# Patient Record
Sex: FEMALE
[De-identification: names, ages, dates, MRNs, and addresses within clinical notes are randomized per-mention and may not be internally consistent; named-entity substitution may affect disease eponyms.]

---

## 2017-09-13 ENCOUNTER — HOSPITAL ENCOUNTER (EMERGENCY)
Dept: HOSPITAL 14 - H.ER | Age: 82
Discharge: HOME | End: 2017-09-13
Payer: MEDICARE

## 2017-09-13 VITALS
TEMPERATURE: 99 F | HEART RATE: 71 BPM | SYSTOLIC BLOOD PRESSURE: 149 MMHG | DIASTOLIC BLOOD PRESSURE: 70 MMHG | RESPIRATION RATE: 16 BRPM | OXYGEN SATURATION: 87 %

## 2017-09-13 VITALS — BODY MASS INDEX: 38.7 KG/M2

## 2017-09-13 DIAGNOSIS — Z95.5: ICD-10-CM

## 2017-09-13 DIAGNOSIS — Z88.0: ICD-10-CM

## 2017-09-13 DIAGNOSIS — Z79.82: ICD-10-CM

## 2017-09-13 DIAGNOSIS — K80.20: Primary | ICD-10-CM

## 2017-09-13 DIAGNOSIS — I10: ICD-10-CM

## 2017-09-13 LAB
ALBUMIN/GLOB SERPL: 1.3 {RATIO} (ref 1–2.1)
ALP SERPL-CCNC: 112 U/L (ref 38–126)
ALT SERPL-CCNC: 30 U/L (ref 9–52)
AST SERPL-CCNC: 22 U/L (ref 14–36)
BASE EXCESS BLDV CALC-SCNC: 7.4 MMOL/L (ref 0–2)
BASOPHILS # BLD AUTO: 0 K/UL (ref 0–0.2)
BASOPHILS NFR BLD: 0.4 % (ref 0–2)
BILIRUB SERPL-MCNC: 0.5 MG/DL (ref 0.2–1.3)
BUN SERPL-MCNC: 18 MG/DL (ref 7–17)
CALCIUM SERPL-MCNC: 8.9 MG/DL (ref 8.4–10.2)
CHLORIDE SERPL-SCNC: 103 MMOL/L (ref 98–107)
CO2 SERPL-SCNC: 30 MMOL/L (ref 22–30)
EOSINOPHIL # BLD AUTO: 0.3 K/UL (ref 0–0.7)
EOSINOPHIL NFR BLD: 3.5 % (ref 0–4)
ERYTHROCYTE [DISTWIDTH] IN BLOOD BY AUTOMATED COUNT: 15.5 % (ref 11.5–14.5)
GLOBULIN SER-MCNC: 2.8 GM/DL (ref 2.2–3.9)
GLUCOSE SERPL-MCNC: 98 MG/DL (ref 65–105)
HCT VFR BLD CALC: 39 % (ref 34–47)
LYMPHOCYTES # BLD AUTO: 0.9 K/UL (ref 1–4.3)
LYMPHOCYTES NFR BLD AUTO: 11.6 % (ref 20–40)
MCH RBC QN AUTO: 27.3 PG (ref 27–31)
MCHC RBC AUTO-ENTMCNC: 31.2 G/DL (ref 33–37)
MCV RBC AUTO: 87.5 FL (ref 81–99)
MONOCYTES # BLD: 0.6 K/UL (ref 0–0.8)
MONOCYTES NFR BLD: 8.4 % (ref 0–10)
NEUTROPHILS # BLD: 5.8 K/UL (ref 1.8–7)
NEUTROPHILS NFR BLD AUTO: 76.1 % (ref 50–75)
NRBC BLD AUTO-RTO: 0 % (ref 0–0)
PCO2 BLDV: 68 MMHG (ref 40–60)
PH BLDV: 7.33 [PH] (ref 7.32–7.43)
PLATELET # BLD: 217 K/UL (ref 130–400)
PMV BLD AUTO: 7.9 FL (ref 7.2–11.7)
POTASSIUM SERPL-SCNC: 4.5 MMOL/L (ref 3.6–5)
PROT SERPL-MCNC: 6.4 G/DL (ref 6.3–8.2)
SODIUM SERPL-SCNC: 141 MMOL/L (ref 132–148)
WBC # BLD AUTO: 7.6 K/UL (ref 4.8–10.8)

## 2017-09-13 PROCEDURE — 71020: CPT

## 2017-09-13 PROCEDURE — 80053 COMPREHEN METABOLIC PANEL: CPT

## 2017-09-13 PROCEDURE — 96374 THER/PROPH/DIAG INJ IV PUSH: CPT

## 2017-09-13 PROCEDURE — 99282 EMERGENCY DEPT VISIT SF MDM: CPT

## 2017-09-13 PROCEDURE — 85025 COMPLETE CBC W/AUTO DIFF WBC: CPT

## 2017-09-13 PROCEDURE — 74177 CT ABD & PELVIS W/CONTRAST: CPT

## 2017-09-13 PROCEDURE — 82803 BLOOD GASES ANY COMBINATION: CPT

## 2017-09-13 PROCEDURE — 84484 ASSAY OF TROPONIN QUANT: CPT

## 2017-09-13 NOTE — ED PDOC
HPI: Abdomen


Time Seen by Provider: 09/13/17 12:30


Chief Complaint (Nursing): Abdominal Pain


Chief Complaint (Provider): Abdominal pain


History Per: Patient


History/Exam Limitations: no limitations


Onset/Duration Of Symptoms: Worse Since (one week)


Location Of Pain/Discomfort: Other (right flank)


Quality Of Discomfort: Sharp (constant, and non-radiating)


Associated Symptoms: denies: Fever, Vomiting, Diarrhea, Urinary Symptoms


Additional Complaint(s): 





Patient is a 85 y/o female sent by Dr. Pyle for evaluation of sharp and 

constant right flank pain without radiation x1 week that is worse today. Of note

, patient reports ongoing baseline shortness of breath, and is on nocturnal 

supplemental oxygen for chronic obstructive pulmonary disorder. Denies vomiting

, diarrhea, fever, urinary symptoms, or other complaints.





PCP: Dr. Pyle





Past Medical History


Reviewed: Historical Data, Nursing Documentation, Vital Signs


Vital Signs: 


 Last Vital Signs











Temp  99.0 F   09/13/17 12:17


 


Pulse  71   09/13/17 12:17


 


Resp  16   09/13/17 12:17


 


BP  149/70   09/13/17 12:17


 


Pulse Ox  87 L  09/13/17 14:08














- Medical History


PMH: Atrial Fibrillation (transient), COPD, Emphysema, Fractures (r ankle), HTN


   Denies: Diabetes, Chronic Kidney Disease





- Surgical History


Surgical History: Coronary Stent (x 2)


   Denies: Appendectomy





- Family History


Family History: States: Unknown Family Hx





- Social History


Current smoker - smoking cessation education provided: No


Ex-Smoker (has not smoked in the last 12 months): Yes


Alcohol: None


Drugs: Denies





- Home Medications


Home Medications: 


 Ambulatory Orders











 Medication  Instructions  Recorded


 


Aspirin [Ecotrin] 81 mg PO DAILY 06/20/16


 


Atorvastatin [Lipitor] 80 mg PO HS 06/20/16


 


Bisoprolol [Zebeta] 2.5 mg PO DAILY 06/20/16


 


Clopidogrel [Plavix] 75 mg PO DAILY 06/20/16


 


Isosorbide Mononitrate ER [Imdur 30 mg PO DAILY 06/20/16





ER]  


 


Lisinopril [Zestril] 2.5 mg PO DAILY 06/20/16


 


Albuterol 0.083% [Albuterol 0.083% 3 ml IH Q6H PRN 10/05/16





Inhal Sol (2.5 mg/3 ml) UD]  


 


Albuterol HFA [Ventolin HFA 90 2 puff IH Q4H PRN 10/05/16





mcg/actuation (8 g)]  


 


traMADol [Ultram] 50 mg PO TID PRN #12 tab 09/13/17














- Allergies


Allergies/Adverse Reactions: 


 Allergies











Allergy/AdvReac Type Severity Reaction Status Date / Time


 


Penicillins Allergy Mild SWELLING Verified 10/05/16 09:43














Review of Systems


ROS Statement: Except As Marked, All Systems Reviewed And Found Negative


Constitutional: Negative for: Fever


Gastrointestinal: Negative for: Vomiting, Diarrhea


Genitourinary Female: Negative for: Dysuria, Frequency, Hematuria


Musculoskeletal: Positive for: Other (right flank pain)





Physical Exam





- Reviewed


Nursing Documentation Reviewed: Yes


Vital Signs Reviewed: Yes





- Physical Exam


Appears: Positive for: Uncomfortable


Head Exam: Positive for: ATRAUMATIC, NORMAL INSPECTION, NORMOCEPHALIC


Skin: Positive for: Normal Color, Warm, Dry (with no zoster lesions)


Eye Exam: Positive for: Normal appearance


Neck: Positive for: Normal


Cardiovascular/Chest: Positive for: Regular Rate, Rhythm


Respiratory: Positive for: Respiratory Distress (mild), Other (speaking full 

sentences).  Negative for: Decreased Breath Sounds (bilaterally)


Gastrointestinal/Abdominal: Positive for: Soft, Tenderness (mild right sided 

abdominal tenderness)


Back: Positive for: Other (right flank tenderness)


Extremity: Positive for: Normal ROM


Neurologic/Psych: Positive for: Alert, Oriented (x3)





- Laboratory Results


Result Diagrams: 


 09/13/17 13:00





 09/13/17 13:55





- ECG


O2 Sat by Pulse Oximetry: 87 (RA)


Pulse Ox Interpretation: Abnormal (hypoxic)





Medical Decision Making


Medical Decision Making: 


Time: 12:31





Initial impression:  Abdominal pain





Initial plan:


 CT Abdominal/pelvic


 Labs


 Chest X-Ray


 Toradol 15 mg IV


 Urinalysis


 Reevaluation








------------





My Comment : 


********************************************************************************

***





PROCEDURE:  CT Abdomen and Pelvis with contrast





HISTORY:


R flank pain x1wk





COMPARISON:


None.





TECHNIQUE:


Contrast dose: 95 cc of Omnipaque 3





Radiation dose:





Total exam DLP = 1027 mGy-cm.





This CT exam was performed using one or more of the following dose reduction 

techniques: Automated exposure control, adjustment of the mA and/or kV 

according to patient size, and/or use of iterative reconstruction technique.





FINDINGS:





LOWER THORAX:


Unremarkable. 





LIVER:


Unremarkable. No gross lesion or ductal dilatation. 





GALLBLADDER AND BILE DUCTS:


Cholelithiasis. . 





PANCREAS:


Unremarkable. No gross lesion or ductal dilatation.





SPLEEN:


Unremarkable. 





ADRENALS:


Unremarkable. No mass. 





KIDNEYS AND URETERS:


Unremarkable. No hydronephrosis. No solid mass. 





VASCULATURE:


Unremarkable. No aortic aneurysm. 





BOWEL:


Unremarkable. No obstruction. No gross mural thickening. 





APPENDIX:


Normal appendix. 





PERITONEUM:


Unremarkable. No free fluid. No free air. 





LYMPH NODES:


Unremarkable. No enlarged lymph nodes. 





BLADDER:


Unremarkable. 





REPRODUCTIVE:


Unremarkable. 





BONES:


No acute fracture. 





OTHER FINDINGS:


None.





IMPRESSION:


Cholelithiasis.





Per Dr Ovalles radiologist





---------------


5pm PMD Dr Pyle paged to inform





6PM D/W Dr Pyle, given normal LFTs, normal WBC, no fever, can be discharged to 

followup outpatient.


Rx tramadol.


Pt offered admission for evaluation by surgeon but prefers to go home.  

Explained risk of delayed surgery of gallstones can be significant. 

















Scribe Attestation:


Documented by Jackie Logan, acting as a scribe for Alcon Lyon DO.





Provider Scribe Attestation:


All medical record entries made by the Scribe were at my direction and 

personally dictated by me. I have reviewed the chart and agree that the record 

accurately reflects my personal performance of the history, physical exam, 

medical decision making, and the department course for this patient. I have 

also personally directed, reviewed, and agree with the discharge instructions 

and disposition.





Disposition





- Clinical Impression


Clinical Impression: 


 Cholelithiases








- Patient ED Disposition


Is Patient to be Admitted: No


Counseled Patient/Family Regarding: Studies Performed, Diagnosis, Need For 

Followup, Rx Given





- Disposition


Disposition: Routine/Home


Disposition Time: 16:50


Condition: FAIR


Forms:  [employer] (English)

## 2017-09-13 NOTE — CT
PROCEDURE:  CT Abdomen and Pelvis with contrast



HISTORY:

R flank pain x1wk



COMPARISON:

None.



TECHNIQUE:

Contrast dose: 95 cc of Omnipaque 3



Radiation dose:



Total exam DLP = 1027 mGy-cm.



This CT exam was performed using one or more of the following dose 

reduction techniques: Automated exposure control, adjustment of the 

mA and/or kV according to patient size, and/or use of iterative 

reconstruction technique.



FINDINGS:



LOWER THORAX:

Unremarkable. 



LIVER:

Unremarkable. No gross lesion or ductal dilatation. 



GALLBLADDER AND BILE DUCTS:

Cholelithiasis. . 



PANCREAS:

Unremarkable. No gross lesion or ductal dilatation.



SPLEEN:

Unremarkable. 



ADRENALS:

Unremarkable. No mass. 



KIDNEYS AND URETERS:

Unremarkable. No hydronephrosis. No solid mass. 



VASCULATURE:

Unremarkable. No aortic aneurysm. 



BOWEL:

Unremarkable. No obstruction. No gross mural thickening. 



APPENDIX:

Normal appendix. 



PERITONEUM:

Unremarkable. No free fluid. No free air. 



LYMPH NODES:

Unremarkable. No enlarged lymph nodes. 



BLADDER:

Unremarkable. 



REPRODUCTIVE:

Unremarkable. 



BONES:

No acute fracture. 



OTHER FINDINGS:

None.



IMPRESSION:

Cholelithiasis.

## 2017-09-13 NOTE — RAD
HISTORY:

Chest pain.  Infiltrate suspected.



COMPARISON:

10/05/2016



TECHNIQUE:

Chest PA and lateral



FINDINGS:



LUNGS:

Hyperinflation, manifestations of COPD. No active pulmonary disease. 

Chronic interstitial lung disease. 



PLEURA:

No significant pleural effusion identified. No pneumothorax apparent.



CARDIOVASCULAR:

Cardiomegaly.  No evidence of acute, significant cardiovascular 

disease. 



OSSEOUS STRUCTURES:

No significant abnormalities.



VISUALIZED UPPER ABDOMEN:

Normal.



OTHER FINDINGS:

None.



IMPRESSION:

No active disease. No significant interval change compared to the 

prior examination(s).

## 2017-09-15 ENCOUNTER — HOSPITAL ENCOUNTER (INPATIENT)
Dept: HOSPITAL 14 - H.ER | Age: 82
LOS: 5 days | Discharge: HOME | DRG: 445 | End: 2017-09-20
Attending: GENERAL ACUTE CARE HOSPITAL | Admitting: GENERAL ACUTE CARE HOSPITAL
Payer: MEDICARE

## 2017-09-15 VITALS — BODY MASS INDEX: 38.7 KG/M2

## 2017-09-15 DIAGNOSIS — Z99.81: ICD-10-CM

## 2017-09-15 DIAGNOSIS — I25.2: ICD-10-CM

## 2017-09-15 DIAGNOSIS — D64.9: ICD-10-CM

## 2017-09-15 DIAGNOSIS — Z87.891: ICD-10-CM

## 2017-09-15 DIAGNOSIS — J44.1: ICD-10-CM

## 2017-09-15 DIAGNOSIS — R09.02: ICD-10-CM

## 2017-09-15 DIAGNOSIS — I48.0: ICD-10-CM

## 2017-09-15 DIAGNOSIS — I25.10: ICD-10-CM

## 2017-09-15 DIAGNOSIS — I50.9: ICD-10-CM

## 2017-09-15 DIAGNOSIS — K21.9: ICD-10-CM

## 2017-09-15 DIAGNOSIS — E66.09: ICD-10-CM

## 2017-09-15 DIAGNOSIS — Z88.0: ICD-10-CM

## 2017-09-15 DIAGNOSIS — Z95.5: ICD-10-CM

## 2017-09-15 DIAGNOSIS — I27.2: ICD-10-CM

## 2017-09-15 DIAGNOSIS — K80.20: Primary | ICD-10-CM

## 2017-09-15 DIAGNOSIS — M40.00: ICD-10-CM

## 2017-09-15 DIAGNOSIS — J84.10: ICD-10-CM

## 2017-09-15 DIAGNOSIS — Z79.82: ICD-10-CM

## 2017-09-15 DIAGNOSIS — I11.0: ICD-10-CM

## 2017-09-15 LAB
ALBUMIN/GLOB SERPL: 1.3 {RATIO} (ref 1–2.1)
ALP SERPL-CCNC: 108 U/L (ref 38–126)
ALT SERPL-CCNC: 34 U/L (ref 9–52)
APTT BLD: 33.8 SECONDS (ref 25.6–37.1)
AST SERPL-CCNC: 32 U/L (ref 14–36)
BASE EXCESS BLDV CALC-SCNC: 9.6 MMOL/L (ref 0–2)
BASOPHILS # BLD AUTO: 0 K/UL (ref 0–0.2)
BASOPHILS NFR BLD: 0.5 % (ref 0–2)
BILIRUB SERPL-MCNC: 0.5 MG/DL (ref 0.2–1.3)
BUN SERPL-MCNC: 18 MG/DL (ref 7–17)
CALCIUM SERPL-MCNC: 9.2 MG/DL (ref 8.4–10.2)
CHLORIDE SERPL-SCNC: 102 MMOL/L (ref 98–107)
CO2 SERPL-SCNC: 29 MMOL/L (ref 22–30)
EOSINOPHIL # BLD AUTO: 0.2 K/UL (ref 0–0.7)
EOSINOPHIL NFR BLD: 1 % (ref 0–7)
EOSINOPHIL NFR BLD: 2 % (ref 0–4)
ERYTHROCYTE [DISTWIDTH] IN BLOOD BY AUTOMATED COUNT: 14.9 % (ref 11.5–14.5)
GLOBULIN SER-MCNC: 2.9 GM/DL (ref 2.2–3.9)
GLUCOSE SERPL-MCNC: 111 MG/DL (ref 65–105)
HCT VFR BLD CALC: 40.1 % (ref 34–47)
LG PLATELETS BLD QL SMEAR: PRESENT
LIPASE SERPL-CCNC: 29 U/L (ref 23–300)
LYMPHOCYTES # BLD AUTO: 0.7 K/UL (ref 1–4.3)
LYMPHOCYTES NFR BLD AUTO: 8.1 % (ref 20–40)
MCH RBC QN AUTO: 27.7 PG (ref 27–31)
MCHC RBC AUTO-ENTMCNC: 31.3 G/DL (ref 33–37)
MCV RBC AUTO: 88.4 FL (ref 81–99)
MONOCYTES # BLD: 0.6 K/UL (ref 0–0.8)
MONOCYTES NFR BLD: 7.4 % (ref 0–10)
NEUTROPHILS # BLD: 6.9 K/UL (ref 1.8–7)
NEUTROPHILS NFR BLD AUTO: 81 % (ref 42–75)
NEUTROPHILS NFR BLD AUTO: 82 % (ref 50–75)
NRBC BLD AUTO-RTO: 0 % (ref 0–0)
PCO2 BLDV: 35 MMHG (ref 40–60)
PH BLDV: 7.57 [PH] (ref 7.32–7.43)
PLATELET # BLD: 213 K/UL (ref 130–400)
PMV BLD AUTO: 7.9 FL (ref 7.2–11.7)
POTASSIUM SERPL-SCNC: 4.4 MMOL/L (ref 3.6–5)
PROT SERPL-MCNC: 6.6 G/DL (ref 6.3–8.2)
SODIUM SERPL-SCNC: 141 MMOL/L (ref 132–148)
TOTAL CELLS COUNTED BLD: 100
WBC # BLD AUTO: 8.4 K/UL (ref 4.8–10.8)

## 2017-09-15 RX ADMIN — CIPROFLOXACIN SCH: 2 INJECTION, SOLUTION INTRAVENOUS at 21:19

## 2017-09-15 RX ADMIN — CIPROFLOXACIN SCH MLS/HR: 2 INJECTION, SOLUTION INTRAVENOUS at 19:22

## 2017-09-15 NOTE — CP.PCM.HP
History of Present Illness





- History of Present Illness


History of Present Illness: 





87 yo female with history of CAD (2 stents 4 yrs ago), AFib (paroxysmal), COPD (

oxygen dependent) and HTN was seen yesterday in the ER because of on and off 

RUQ pain since a week ago. She was advised admission but patient said she would 

rather follow up with the surgeon the next day. Came back today because pain 

had not improved and now accompanied with nausea and vomiting. Denied fever, 

chills, chest pain or SOB.




















Present on Admission





- Present on Admission


Any Indicators Present on Admission: No


History of DVT/PE: No


History of Uncontrolled Diabetes: No


Urinary Catheter: No


Decubitus Ulcer Present: No





Review of Systems





- Review of Systems


All systems: reviewed and no additional remarkable complaints except (aside 

from those mentioned above, 12 point system review were negative by me)





Past Patient History





- Infectious Disease


Hx of Infectious Diseases: None





- Tetanus Immunizations


Tetanus Immunization: Unknown





- Past Medical History & Family History


Past Medical History?: Yes





- Past Social History


Smoking Status: Former Smoker


Alcohol: None


Drugs: Denies


Home Situation {Lives}: Alone





- CARDIAC


Hx Atrial Fibrillation: Yes (transient)


Hx Hypertension: Yes





- PULMONARY


Hx Chronic Obstructive Pulmonary Disease (COPD): Yes


Hx Emphysema: Yes





- NEUROLOGICAL


Hx Neurological Disorder: No





- HEENT


Hx HEENT Problems: No





- RENAL


Hx Chronic Kidney Disease: No





- ENDOCRINE/METABOLIC


Hx Endocrine Disorders: No





- HEMATOLOGICAL/ONCOLOGICAL


Hx Blood Disorders: No





- INTEGUMENTARY


Hx Dermatological Problems: No





- MUSCULOSKELETAL/RHEUMATOLOGICAL


Hx Fractures: Yes (r ankle)





- GASTROINTESTINAL


Hx Gastrointestinal Disorders: No





- GENITOURINARY/GYNECOLOGICAL


Hx Genitourinary Disorders: Yes


Hx Incontinence: Yes (stress)





- PSYCHIATRIC


Hx Psychophysiologic Disorder: No


Hx Substance Use: No





- SURGICAL HISTORY


Hx Appendectomy: No


Hx Coronary Stent: Yes (x 2)





- ANESTHESIA


Hx Anesthesia: Yes


Hx Anesthesia Reactions: No


Hx Malignant Hyperthermia: No





Meds


Allergies/Adverse Reactions: 


 Allergies











Allergy/AdvReac Type Severity Reaction Status Date / Time


 


Penicillins Allergy Mild SWELLING Verified 10/05/16 09:43














Physical Exam





- Constitutional


Appears: No Acute Distress





- Head Exam


Head Exam: ATRAUMATIC





- Eye Exam


Eye Exam: absent: Scleral icterus





- ENT Exam


ENT Exam: Mucous Membranes Moist





- Neck Exam


Neck exam: Negative for: Meningismus





- Respiratory Exam


Respiratory Exam: Decreased Breath Sounds, Rhonchi, Wheezes.  absent: 

Respiratory Distress





- Cardiovascular Exam


Cardiovascular Exam: REGULAR RHYTHM, +S1, +S2





- GI/Abdominal Exam


GI & Abdominal Exam: Diminished Bowel Sounds, Soft.  absent: Tenderness





- Rectal Exam


Rectal Exam: Deferred





- Extremities Exam


Extremities exam: Positive for: pedal edema (slight erythema and swelling of 

both legs).  Negative for: calf tenderness





- Neurological Exam


Neurological exam: Alert, Oriented x3





- Psychiatric Exam


Psychiatric exam: Anxious





- Skin


Skin Exam: Dry, Intact





Results





- Vital Signs


Recent Vital Signs: 





 Last Vital Signs











Temp  97.6 F   09/15/17 12:17


 


Pulse  69   09/15/17 12:17


 


Resp  18   09/15/17 12:17


 


BP  135/73   09/15/17 12:17


 


Pulse Ox      














- Labs


Result Diagrams: 


 09/15/17 12:30





 09/15/17 12:30


Labs: 





 Laboratory Results - last 24 hr











  09/15/17 09/15/17 09/15/17





  12:30 12:30 12:30


 


WBC   8.4 


 


RBC   4.54 


 


Hgb   12.6 


 


Hct   40.1 


 


MCV   88.4 


 


MCH   27.7 


 


MCHC   31.3 L 


 


RDW   14.9 H 


 


Plt Count   213 


 


MPV   7.9 


 


Neut % (Auto)   82.0 H 


 


Lymph % (Auto)   8.1 L 


 


Mono % (Auto)   7.4 


 


Eos % (Auto)   2.0 


 


Baso % (Auto)   0.5 


 


Neut #   6.9 


 


Lymph #   0.7 L 


 


Mono #   0.6 


 


Eos #   0.2 


 


Baso #   0.0 


 


Neutrophils % (Manual)   81 H 


 


Band Neutrophils %   1 


 


Lymphocytes % (Manual)   9 L 


 


Monocytes % (Manual)   8 


 


Eosinophils % (Manual)   1 


 


Platelet Estimate   Normal 


 


Large Platelets   Present 


 


Poikilocytosis (manual   Slight 


 


Anisocytosis (manual)   Slight 


 


Ovalocytes   Slight 


 


PT    11.9


 


INR    1.2


 


APTT    33.8


 


pO2   


 


VBG pH   


 


VBG pCO2   


 


VBG HCO3   


 


VBG Total CO2   


 


VBG O2 Sat (Calc)   


 


VBG Base Excess   


 


VBG Potassium   


 


Glucose   


 


Lactate   


 


FiO2   


 


Sodium  141  


 


Potassium  4.4  


 


Chloride  102  


 


Carbon Dioxide  29  


 


Anion Gap  14  


 


BUN  18 H  


 


Creatinine  0.5 L  


 


Est GFR ( Amer)  > 60  


 


Est GFR (Non-Af Amer)  > 60  


 


Random Glucose  111 H  


 


Calcium  9.2  


 


Total Bilirubin  0.5  


 


AST  32  


 


ALT  34  


 


Alkaline Phosphatase  108  


 


Troponin I  < 0.0120  


 


NT-Pro-B Natriuret Pep  504  


 


Total Protein  6.6  


 


Albumin  3.7  


 


Globulin  2.9  


 


Albumin/Globulin Ratio  1.3  


 


Lipase  29  


 


Venous Blood Potassium   














  09/15/17





  12:50


 


WBC 


 


RBC 


 


Hgb 


 


Hct 


 


MCV 


 


MCH 


 


MCHC 


 


RDW 


 


Plt Count 


 


MPV 


 


Neut % (Auto) 


 


Lymph % (Auto) 


 


Mono % (Auto) 


 


Eos % (Auto) 


 


Baso % (Auto) 


 


Neut # 


 


Lymph # 


 


Mono # 


 


Eos # 


 


Baso # 


 


Neutrophils % (Manual) 


 


Band Neutrophils % 


 


Lymphocytes % (Manual) 


 


Monocytes % (Manual) 


 


Eosinophils % (Manual) 


 


Platelet Estimate 


 


Large Platelets 


 


Poikilocytosis (manual 


 


Anisocytosis (manual) 


 


Ovalocytes 


 


PT 


 


INR 


 


APTT 


 


pO2  30


 


VBG pH  7.57 H


 


VBG pCO2  35 L


 


VBG HCO3  31.7


 


VBG Total CO2  33.2 H


 


VBG O2 Sat (Calc)  77.6 H


 


VBG Base Excess  9.6 H


 


VBG Potassium  5.5 H


 


Glucose  65


 


Lactate  1.4


 


FiO2  21.0


 


Sodium 


 


Potassium 


 


Chloride  129.0 H


 


Carbon Dioxide 


 


Anion Gap 


 


BUN 


 


Creatinine 


 


Est GFR ( Amer) 


 


Est GFR (Non-Af Amer) 


 


Random Glucose 


 


Calcium 


 


Total Bilirubin 


 


AST 


 


ALT 


 


Alkaline Phosphatase 


 


Troponin I 


 


NT-Pro-B Natriuret Pep 


 


Total Protein 


 


Albumin 


 


Globulin 


 


Albumin/Globulin Ratio 


 


Lipase 


 


Venous Blood Potassium  5.5 H














Assessment & Plan


(1) Abdominal pain


Status: Acute   


Comment: admit in med/surg.  surgical consult with Dr Montenegro.  keep NPO.  start 

IVF with NSS at 80cc/hr.  cardiology consult with Dr Collado for possible need of 

cardiac clearance.  Morphine 2mg IV q 6hrs prn for pain   





(2) Cholelithiases


Status: Acute   


Comment: abdominal pain secondary to gallstone   





(3) COPD exacerbation


Status: Chronic   Priority: High   


Comment: continue O2 supplement via NC at 2LPM.  Duoneb via nebulizer q 4hrs 

prn for SOB/wheezing   





(4) CAD (coronary artery disease)


Status: Chronic   Priority: High   


Comment: no chest pain.  ASA 81mg PO daily.  Plavix 75mg PO daily.  Lipitor 

80mg PO HS.  Zebeta 2.5mg PO daily.  Isosorbide Mononitrate 30mg PO daily   





(5) Hypertension


Status: Chronic   Priority: High   


Comment: BP controlled.  continue Lisinopril 2.5mg PO daily.  Zebeta 2.5mg PO 

daily.  Isosorbide Mononitrate 30mg PO daily   





(6) Afib


Status: Acute   


Comment: presently in sinus and well controlled rate.  on Plavix 57mg PO daily.

  ASA 81mg PO daily   





(7) DVT prophylaxis


Status: Acute   


Comment: venodyne boots while on bed

## 2017-09-15 NOTE — CP.PCM.CON
History of Present Illness





- History of Present Illness


History of Present Illness: 





General SUrgery- Dr. Montenegro





86F PMH of COPD on supplemental oxygen presents to the ED with sharp RUQ pain 

initially started two weeks ago pain radiating to the back. Pt recently came 

into the ED a couple of days ago showing gallstones. Late last night pt had 

multiple NBNB emesis. Denies Fevers, chills, CP, Diarrhea, numbness or tingling 

in the extremities. chronic dyspnea.





PMH: CAD, COPD


PSH: Cardiac Stents x2 


ALL: PCN


SocialHx: former smoker





Review of Systems





- Review of Systems


All systems: reviewed and no additional remarkable complaints except





- Constitutional


Constitutional: As Per HPI





Past Patient History





- Infectious Disease


Hx of Infectious Diseases: None





- Tetanus Immunizations


Tetanus Immunization: Unknown





- Past Medical History & Family History


Past Medical History?: Yes





- Past Social History


Smoking Status: Former Smoker


Alcohol: None


Drugs: Denies


Home Situation {Lives}: Alone





- CARDIAC


Hx Atrial Fibrillation: Yes (transient)


Hx Hypertension: Yes





- PULMONARY


Hx Chronic Obstructive Pulmonary Disease (COPD): Yes


Hx Emphysema: Yes





- NEUROLOGICAL


Hx Neurological Disorder: No





- HEENT


Hx HEENT Problems: No





- RENAL


Hx Chronic Kidney Disease: No





- ENDOCRINE/METABOLIC


Hx Endocrine Disorders: No





- HEMATOLOGICAL/ONCOLOGICAL


Hx Blood Disorders: No





- INTEGUMENTARY


Hx Dermatological Problems: No





- MUSCULOSKELETAL/RHEUMATOLOGICAL


Hx Fractures: Yes (r ankle)





- GASTROINTESTINAL


Hx Gastrointestinal Disorders: No





- GENITOURINARY/GYNECOLOGICAL


Hx Genitourinary Disorders: Yes


Hx Incontinence: Yes (stress)





- PSYCHIATRIC


Hx Psychophysiologic Disorder: No


Hx Substance Use: No





- SURGICAL HISTORY


Hx Appendectomy: No


Hx Coronary Stent: Yes (x 2)





- ANESTHESIA


Hx Anesthesia: Yes


Hx Anesthesia Reactions: No


Hx Malignant Hyperthermia: No





Meds


Allergies/Adverse Reactions: 


 Allergies











Allergy/AdvReac Type Severity Reaction Status Date / Time


 


Penicillins Allergy Mild SWELLING Verified 10/05/16 09:43














- Medications


Medications: 


 Current Medications





Sodium Chloride (Sodium Chloride 0.9%)  500 mls @ 100 mls/hr IV .Q5H STA


   Stop: 09/15/17 17:40


   Last Admin: 09/15/17 12:51 Dose:  100 mls/hr


Sodium Chloride (Sodium Chloride 0.9%)  1,000 mls @ 80 mls/hr IV .[id number] GAUTAM


Morphine Sulfate (Morphine)  2 mg IVP Q6 PRN


   PRN Reason: Pain, moderate (4-7)











Physical Exam





- Constitutional


Appears: No Acute Distress





- Neck Exam


Additional comments: 





Dentures in place





- Respiratory Exam


Respiratory Exam: Decreased Breath Sounds, Prolonged Expiratory Phase, Wheezes, 

NORMAL BREATHING PATTERN.  absent: Accessory Muscle Use


Additional comments: 





Oxygen NC





- Cardiovascular Exam


Cardiovascular Exam: +S1, +S2





- GI/Abdominal Exam


GI & Abdominal Exam: Soft, Tenderness.  absent: Distended, Guarding, Hernia, 

Rigid


Additional comments: 





TTP lateral RUQ





- Extremities Exam


Additional comments: 





+2 DP b/l





- Neurological Exam


Neurological exam: Alert, Oriented x3





Results





- Vital Signs


Recent Vital Signs: 


 Last Vital Signs











Temp  97.6 F   09/15/17 12:17


 


Pulse  69   09/15/17 12:17


 


Resp  18   09/15/17 12:17


 


BP  135/73   09/15/17 12:17


 


Pulse Ox      














- Labs


Result Diagrams: 


 09/15/17 12:30





 09/15/17 12:30





Assessment & Plan





- Assessment and Plan (Free Text)


Assessment: 





86F RUQ pain w/ cholelithiasis


Plan: 





- IVF/abx


- NPO


- GI/DVT ppx


- HIDA scan


- further recs per Dr. Lan Burnett PGY1

## 2017-09-15 NOTE — ED PDOC
HPI: Abdomen


Time Seen by Provider: 09/15/17 12:27


Chief Complaint (Nursing): Abdominal Pain


Chief Complaint (Provider): Abd pain


History Per: Patient


History/Exam Limitations: no limitations


Onset/Duration Of Symptoms: Days


Additional History Per: Patient


Additional Complaint(s): 





Pt. with abd pain RUQ and dx with gallstones few days ago.  Did not want to 

stay in the hospital and wanted to fu with a surgeon at [facility].  Pt. is back 

as pain has returned.  Has nause, vomit.  No weakness, dizziness, headaches, 

chest pain.  Has chronic dyspnea, same as previous.   PCP Lashanda.





Past Medical History


Vital Signs: 


 Last Vital Signs











Temp  97.6 F   09/15/17 12:17


 


Pulse  69   09/15/17 12:17


 


Resp  18   09/15/17 12:17


 


BP  135/73   09/15/17 12:17


 


Pulse Ox      














- Medical History


PMH: Atrial Fibrillation (transient), COPD, Emphysema, Fractures (r ankle), HTN


   Denies: Diabetes, Chronic Kidney Disease





- Surgical History


Surgical History: Coronary Stent (x 2)


   Denies: Appendectomy





- Family History


Family History: States: Unknown Family Hx





- Living Arrangements


Living Arrangements: With Family





- Social History


Current smoker - smoking cessation education provided: No


Alcohol: None


Drugs: Denies





- Home Medications


Home Medications: 


 Ambulatory Orders











 Medication  Instructions  Recorded


 


Aspirin [Ecotrin] 81 mg PO DAILY 06/20/16


 


Atorvastatin [Lipitor] 80 mg PO HS 06/20/16


 


Bisoprolol [Zebeta] 2.5 mg PO DAILY 06/20/16


 


Clopidogrel [Plavix] 75 mg PO DAILY 06/20/16


 


Isosorbide Mononitrate ER [Imdur 30 mg PO DAILY 06/20/16





ER]  


 


Lisinopril [Zestril] 2.5 mg PO DAILY 06/20/16


 


Albuterol HFA [Ventolin HFA 90 2 puff IH Q4H PRN 10/05/16





mcg/actuation (8 g)]  


 


traMADol [Ultram] 50 mg PO TID PRN #12 tab 09/13/17














- Allergies


Allergies/Adverse Reactions: 


 Allergies











Allergy/AdvReac Type Severity Reaction Status Date / Time


 


Penicillins Allergy Mild SWELLING Verified 10/05/16 09:43














Review of Systems


ROS Statement: Except As Marked, All Systems Reviewed And Found Negative


Gastrointestinal: Positive for: Nausea, Vomiting, Abdominal Pain





Physical Exam





- Reviewed


Nursing Documentation Reviewed: Yes


Vital Signs Reviewed: Yes





- Physical Exam


Appears: Positive for: Non-toxic, No Acute Distress


Head Exam: Positive for: ATRAUMATIC, NORMAL INSPECTION, NORMOCEPHALIC


Skin: Positive for: Normal Color, Warm, DRY


Eye Exam: Positive for: EOMI, Normal appearance, PERRL


ENT: Positive for: Normal ENT Inspection


Neck: Positive for: Normal, Painless ROM


Cardiovascular/Chest: Positive for: Regular Rate, Rhythm


Respiratory: Positive for: CNT, Normal Breath Sounds


Gastrointestinal/Abdominal: Positive for: Bowel Sounds, Soft, Tenderness (RUQ), 

Guarding (mild).  Negative for: Distended


Back: Positive for: Normal Inspection.  Negative for: L CVA Tenderness, R CVA 

Tenderness


Extremity: Positive for: Normal ROM.  Negative for: Tenderness, Pedal Edema


Neurologic/Psych: Positive for: Alert, Oriented





- Laboratory Results


Result Diagrams: 


 09/15/17 12:30





 09/15/17 12:30


Interpretation Of Abn Labs: no acute





- ECG


ECG: Positive for: Interpreted By Me, Viewed By Me


ECG Rhythm: Positive for: Normal QRS, Normal ST Segment, Sinus Rhythm





- CT Scan/US


  ** US


Other Rad Studies (CT/US): Read By Radiologist


Other Rad Interpretation: stones





- Progress


ED Course And Treament: 





1440:  Stable.  AAOx3.  Pain controlled.  Spoke with Dr. Pyle who wants Dr. Langford and hospitalist for admit.  Spoke with hospitalist, will admit.  Spoke 

with Dr. Montenegro, covering Dr. Langford.  





Disposition





- Clinical Impression


Clinical Impression: 


 Cholelithiases








- Patient ED Disposition


Is Patient to be Admitted: Yes


Counseled Patient/Family Regarding: Studies Performed, Diagnosis





- Disposition


Disposition Time: 14:46


Condition: FAIR





- Pt Status Changed To:


Hospital Disposition Of: Inpatient





- Admit Certification


Admit to Inpatient:: After my assessment, the patient will require 

hospitalization for at least two midnights.  This is because of the severity of 

symptoms shown, intensity of services needed, and/or the medical risk in this 

patient being treated as an outpatient.





- POA


Present On Arrival: None

## 2017-09-15 NOTE — US
HISTORY:

Abdominal pain.  Rule out gallstone 



COMPARISON:

Comparison made with CT scan abdomen and pelvis 09/13/2017



TECHNIQUE:

Sonographic evaluation of the right upper quadrant of the abdomen.



FINDINGS:



LIVER:

Measures approximately 15.7 cm in length. .  Smooth contour however 

slight increased echogenicity of the liver parenchyma likely 

representing fatty hepatic infiltration.  Other infiltrative 

hepatocellular disease process not completely excluded. .  No mass. 

No intrahepatic bile duct dilatation. 



GALLBLADDER:

Current study re- demonstrates multiple intraluminal gallbladder 

calculi which exhibit posterior acoustic shadowing. Additionally, 

there also appears to be intraluminal gallbladder sludge.  No obvious 

pericholecystic fluid collections 



COMMON BILE DUCT:

Measures 5.14 mm.  No stones. No dilatation.



PANCREAS:

Unremarkable as visualized. No mass. No ductal dilatation.



RIGHT KIDNEY:

Measures approximately 10.8 x 4.2 x 4.7 cm in length. Normal 

echogenicity. No calculus, mass, or hydronephrosis.



AORTA:

No aneurysmal dilatation.



IVC:

Unremarkable.



OTHER FINDINGS:

None .



IMPRESSION:

Cholelithiasis. No evidence of pericholecystic fluid collections.  

Common bile duct measures approximately 5 mm.

## 2017-09-16 LAB
ALBUMIN/GLOB SERPL: 1.3 {RATIO} (ref 1–2.1)
ALP SERPL-CCNC: 102 U/L (ref 38–126)
ALT SERPL-CCNC: 29 U/L (ref 9–52)
AST SERPL-CCNC: 34 U/L (ref 14–36)
BASOPHILS # BLD AUTO: 0 K/UL (ref 0–0.2)
BASOPHILS NFR BLD: 0.3 % (ref 0–2)
BILIRUB SERPL-MCNC: 0.4 MG/DL (ref 0.2–1.3)
BUN SERPL-MCNC: 18 MG/DL (ref 7–17)
BUN SERPL-MCNC: 18 MG/DL (ref 7–17)
CALCIUM SERPL-MCNC: 8.6 MG/DL (ref 8.4–10.2)
CALCIUM SERPL-MCNC: 8.7 MG/DL (ref 8.4–10.2)
CHLORIDE SERPL-SCNC: 102 MMOL/L (ref 98–107)
CHLORIDE SERPL-SCNC: 103 MMOL/L (ref 98–107)
CO2 SERPL-SCNC: 30 MMOL/L (ref 22–30)
CO2 SERPL-SCNC: 32 MMOL/L (ref 22–30)
EOSINOPHIL # BLD AUTO: 0.1 K/UL (ref 0–0.7)
EOSINOPHIL NFR BLD: 1.6 % (ref 0–4)
ERYTHROCYTE [DISTWIDTH] IN BLOOD BY AUTOMATED COUNT: 15.4 % (ref 11.5–14.5)
GLOBULIN SER-MCNC: 2.7 GM/DL (ref 2.2–3.9)
GLUCOSE SERPL-MCNC: 96 MG/DL (ref 65–105)
GLUCOSE SERPL-MCNC: 97 MG/DL (ref 65–105)
HCT VFR BLD CALC: 37.7 % (ref 34–47)
LYMPHOCYTES # BLD AUTO: 0.8 K/UL (ref 1–4.3)
LYMPHOCYTES NFR BLD AUTO: 11.5 % (ref 20–40)
MCH RBC QN AUTO: 28 PG (ref 27–31)
MCHC RBC AUTO-ENTMCNC: 31.3 G/DL (ref 33–37)
MCV RBC AUTO: 89.4 FL (ref 81–99)
MONOCYTES # BLD: 0.7 K/UL (ref 0–0.8)
MONOCYTES NFR BLD: 9.6 % (ref 0–10)
NEUTROPHILS # BLD: 5.5 K/UL (ref 1.8–7)
NEUTROPHILS NFR BLD AUTO: 77 % (ref 50–75)
NRBC BLD AUTO-RTO: 0.1 % (ref 0–0)
PLATELET # BLD: 195 K/UL (ref 130–400)
PMV BLD AUTO: 7.8 FL (ref 7.2–11.7)
POTASSIUM SERPL-SCNC: 4.4 MMOL/L (ref 3.6–5)
POTASSIUM SERPL-SCNC: 4.4 MMOL/L (ref 3.6–5)
PROT SERPL-MCNC: 6.2 G/DL (ref 6.3–8.2)
SODIUM SERPL-SCNC: 140 MMOL/L (ref 132–148)
SODIUM SERPL-SCNC: 141 MMOL/L (ref 132–148)
WBC # BLD AUTO: 7.2 K/UL (ref 4.8–10.8)

## 2017-09-16 RX ADMIN — IPRATROPIUM BROMIDE AND ALBUTEROL SULFATE SCH: .5; 3 SOLUTION RESPIRATORY (INHALATION) at 19:08

## 2017-09-16 RX ADMIN — CIPROFLOXACIN SCH MLS/HR: 2 INJECTION, SOLUTION INTRAVENOUS at 21:20

## 2017-09-16 RX ADMIN — IPRATROPIUM BROMIDE AND ALBUTEROL SULFATE SCH ML: .5; 3 SOLUTION RESPIRATORY (INHALATION) at 12:24

## 2017-09-16 RX ADMIN — IPRATROPIUM BROMIDE AND ALBUTEROL SULFATE SCH ML: .5; 3 SOLUTION RESPIRATORY (INHALATION) at 15:15

## 2017-09-16 RX ADMIN — AMMONIUM LACTATE SCH APPLIC: 12 CREAM TOPICAL at 17:06

## 2017-09-16 RX ADMIN — IPRATROPIUM BROMIDE AND ALBUTEROL SULFATE SCH ML: .5; 3 SOLUTION RESPIRATORY (INHALATION) at 10:29

## 2017-09-16 RX ADMIN — CIPROFLOXACIN SCH MLS/HR: 2 INJECTION, SOLUTION INTRAVENOUS at 11:37

## 2017-09-16 RX ADMIN — ENOXAPARIN SODIUM SCH MG: 40 INJECTION SUBCUTANEOUS at 17:06

## 2017-09-16 NOTE — CP.PCM.CON
History of Present Illness





- History of Present Illness


History of Present Illness: 





This 86 year old female, a former cigarette smoker, was seen in the office 

complaining of abdominal pain (right flank/right lower quadrant) on and off for 

over one week. She was referred to the emergency room where she was found to 

have cholelithiasis w/o signs of acute cholecystitis. Her pain improved with 

analgesics and she was released. The pain re-occurred, increased in intensity 

and was associated with nausea and vomiting prompting her return to the ER 

again the following day. She has past history CAD with MI and had PTCA with 

stenting, transient atrial fibrillation, CHF, pneumonia, hypertension and COPD.





Review of Systems





- Review of Systems


All systems: reviewed and no additional remarkable complaints except





- Cardiovascular


Cardiovascular: Leg Edema





- Respiratory


Respiratory: Dyspnea on Exertion





- Gastrointestinal


Gastrointestinal: As Per HPI, Abdominal Pain, Nausea, Vomiting





- Integumentary


Integumentary: Dry Skin, Swelling





Past Patient History





- Infectious Disease


Hx of Infectious Diseases: None





- Tetanus Immunizations


Tetanus Immunization: Unknown





- Past Medical History & Family History


Past Medical History?: Yes


Past Family History: Reviewed and not pertinent





- Past Social History


Smoking Status: Former Smoker (stopped 2013)


Chewing Tobacco Use: No


Cigar Use: No


Alcohol: Social


Drugs: Denies


Home Situation {Lives}: Alone





- CARDIAC


Hx Atrial Fibrillation: Yes


Hx Congestive Heart Failure: Yes


Hx Heart Attack: Yes


Hx Hypercholesterolemia: Yes


Hx Hypertension: Yes


Hx Peripheral Edema: Yes





- PULMONARY


Hx Chronic Obstructive Pulmonary Disease (COPD): Yes


Hx Emphysema: Yes


Hx Pneumonia: Yes





- NEUROLOGICAL


Hx Neurological Disorder: No





- HEENT


Hx HEENT Problems: No





- RENAL


Hx Chronic Kidney Disease: No





- ENDOCRINE/METABOLIC


Hx Endocrine Disorders: No





- HEMATOLOGICAL/ONCOLOGICAL


Hx Blood Disorders: No


Hx Human Immunodeficiency Virus (HIV): No





- INTEGUMENTARY


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





- MUSCULOSKELETAL/RHEUMATOLOGICAL


Hx Falls: Yes


Hx Fractures: Yes (right ankle)





- GASTROINTESTINAL


Hx Gastrointestinal Disorders: No





- GENITOURINARY/GYNECOLOGICAL


Hx Genitourinary Disorders: Yes


Other/Comment: Stress Inc





- PSYCHIATRIC


Hx Psychophysiologic Disorder: No


Hx Substance Use: No





- SURGICAL HISTORY


Hx Appendectomy: No


Hx Coronary Stent: Yes (x 2)





- ANESTHESIA


Hx Anesthesia: Yes


Hx Anesthesia Reactions: No


Hx Malignant Hyperthermia: No





Meds


Allergies/Adverse Reactions: 


 Allergies











Allergy/AdvReac Type Severity Reaction Status Date / Time


 


Penicillins Allergy Mild SWELLING Verified 10/05/16 09:43














- Medications


Medications: 


 Current Medications





Albuterol (Ventolin Hfa 90 Mcg/Actuation (8 G))  2 puff IH Q4H PRN


   PRN Reason: Shortness of Breath


Albuterol/Ipratropium (Duoneb 3 Mg/0.5 Mg (3 Ml) Ud)  3 ml INH RQID [facility]


   Last Admin: 09/16/17 10:29 Dose:  3 ml


Sodium Chloride (Sodium Chloride 0.9%)  1,000 mls @ 80 mls/hr IV .[id number] [facility]


   Last Admin: 09/15/17 15:45 Dose:  80 mls/hr


Ciprofloxacin (Cipro 400mg/200ml Dsw)  400 mg in 200 mls @ 200 mls/hr IVPB Q12 

[facility]


   Last Admin: 09/15/17 21:19 Dose:  Not Given


Morphine Sulfate (Morphine)  2 mg IVP Q6 PRN


   PRN Reason: Pain, moderate (4-7)


Ondansetron HCl (Zofran Inj)  4 mg IVP Q4 PRN


   PRN Reason: Nausea/Vomiting


   Last Admin: 09/15/17 20:42 Dose:  4 mg











Physical Exam





- Additional Findings


Additional findings: 





Well developed, overweight female seated in bedside chair.


Pharynx is pink and moist, no exudate,


Conjunctivae are pink w/o scleral icterus, BRUNO.


EAC's patent bilaterally with + cerumen. TM's intact.


Nares patent bilaterally w/o bleeding or exudate.


Neck is supple and trachea midline. No visible JVD, no carotid bruits.


No palpable lymphadenopathy.


Increased AP diameter of thorax, no dullness to percussion. Kyphotic dorsal 

spine.


Breath sounds are diminished bilaterally w/o audible wheezing.


Dry rales are present in the lower lobes bilaterally.


No bronchial breathing or egophony. Few dependant sonorous rhonchi.


Heart sounds are distant, rhythm is regular, no murmur.


Abdomen is fleshy, soft with right flank and RLQ tenderness.


Bowel sounds are present, no palpable mass.


+ dependant edema of both LE's with thickened/scaling epidermis.


Speech is fluent, memory intact, CN intact.





Results





- Vital Signs


Recent Vital Signs: 


 Last Vital Signs











Temp  97.9 F   09/16/17 09:15


 


Pulse  70   09/16/17 10:29


 


Resp  18   09/16/17 09:56


 


BP  158/70 H  09/16/17 07:32


 


Pulse Ox  91 L  09/16/17 09:56














- Labs


Result Diagrams: 


 09/17/17 06:00





 09/17/17 07:00


Labs: 


 Laboratory Results - last 24 hr











  09/16/17 09/16/17 09/16/17





  04:00 06:00 08:40


 


WBC  7.2  


 


RBC  4.22  


 


Hgb  11.8 L  


 


Hct  37.7  


 


MCV  89.4  


 


MCH  28.0  


 


MCHC  31.3 L  


 


RDW  15.4 H  


 


Plt Count  195  


 


MPV  7.8  


 


Neut % (Auto)  77.0 H  


 


Lymph % (Auto)  11.5 L  


 


Mono % (Auto)  9.6  


 


Eos % (Auto)  1.6  


 


Baso % (Auto)  0.3  


 


Neut #  5.5  


 


Lymph #  0.8 L  


 


Mono #  0.7  


 


Eos #  0.1  


 


Baso #  0.0  


 


Sodium   141  140


 


Potassium   4.4  4.4


 


Chloride   102  103


 


Carbon Dioxide   32 H  30


 


Anion Gap   11  12


 


BUN   18 H  18 H


 


Creatinine   0.6 L  0.6 L


 


Est GFR ( Amer)   > 60  > 60


 


Est GFR (Non-Af Amer)   > 60  > 60


 


Random Glucose   97  96


 


Calcium   8.6  8.7


 


Total Bilirubin    0.4


 


AST    34


 


ALT    29


 


Alkaline Phosphatase    102


 


Total Protein    6.2 L


 


Albumin    3.5


 


Globulin    2.7


 


Albumin/Globulin Ratio    1.3














Assessment & Plan


(1) COPD (chronic obstructive pulmonary disease)


Status: Chronic   Priority: High   





(2) Pulmonary fibrosis, unspecified


Status: Chronic   Priority: High   





(3) Hypoxemia requiring supplemental oxygen


Status: Chronic   Priority: High   





(4) Abdominal pain


Status: Acute   Priority: High   





(5) Cholelithiases


Status: Acute   Priority: High   





(6) CAD (coronary artery disease)


Status: Chronic   Priority: High   





(7) Kyphosis (acquired) (postural)


Status: Chronic   Priority: High   


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   





(8) Exogenous obesity


Status: Chronic   Priority: High   





(9) Anemia


Status: Acute   Priority: High   





- Assessment and Plan (Free Text)


Assessment: 





Pulmonary disease appears to be fairly well managed at the present time.


Would continue same regimen in hospital as when home.


Surgical evaluation regarding abdominal pain and potential cholecystectomy 

ongoing.


Plan: 





Continue maintenance medications, antibiotics and DVT prophylaxis added.





- Date & Time


Date: 09/16/17


Time: 10:35

## 2017-09-16 NOTE — CP.PCM.PN
Subjective





- Date & Time of Evaluation


Date of Evaluation: 09/16/17


Time of Evaluation: 07:40





- Subjective


Subjective: 





Patient seen and examined this AM. VLADISLAV. Patient states that her pain is 

improving--only intermittant at this time. No nausea, vomiting, or fevers. 

Would like to eat.





Objective





- Vital Signs/Intake and Output


Vital Signs (last 24 hours): 


 











Temp Pulse Resp BP Pulse Ox


 


 97.9 F   74   18   158/70 H  89 L


 


 09/16/17 07:32  09/16/17 07:32  09/16/17 07:32  09/16/17 07:32  09/16/17 07:32











- Medications


Medications: 


 Current Medications





Albuterol (Ventolin Hfa 90 Mcg/Actuation (8 G))  2 puff IH Q4H PRN


   PRN Reason: Shortness of Breath


Albuterol/Ipratropium (Duoneb 3 Mg/0.5 Mg (3 Ml) Ud)  3 ml INH RQID [facility]


Sodium Chloride (Sodium Chloride 0.9%)  1,000 mls @ 80 mls/hr IV .[id number] [facility]


   Last Admin: 09/15/17 15:45 Dose:  80 mls/hr


Ciprofloxacin (Cipro 400mg/200ml Dsw)  400 mg in 200 mls @ 200 mls/hr IVPB Q12 

[facility]


   Last Admin: 09/15/17 21:19 Dose:  Not Given


Morphine Sulfate (Morphine)  2 mg IVP Q6 PRN


   PRN Reason: Pain, moderate (4-7)


Ondansetron HCl (Zofran Inj)  4 mg IVP Q4 PRN


   PRN Reason: Nausea/Vomiting


   Last Admin: 09/15/17 20:42 Dose:  4 mg











- Labs


Labs: 


 





 09/16/17 04:00 





 09/16/17 06:00 





 











PT  11.9 Seconds (9.8-13.1)   09/15/17  12:30    


 


INR  1.2  (0.9-1.2)   09/15/17  12:30    


 


APTT  33.8 Seconds (25.6-37.1)   09/15/17  12:30    














- Constitutional


Appears: Non-toxic, No Acute Distress





- Head Exam


Head Exam: ATRAUMATIC, NORMOCEPHALIC





- Eye Exam


Eye Exam: Normal appearance.  absent: Conjunctival injection, Scleral icterus





- ENT Exam


ENT Exam: Mucous Membranes Moist, Normal Oropharynx





- Respiratory Exam


Respiratory Exam: NORMAL BREATHING PATTERN.  absent: Accessory Muscle Use, 

Respiratory Distress





- Cardiovascular Exam


Cardiovascular Exam: RRR





- GI/Abdominal Exam


GI & Abdominal Exam: Soft, Tenderness (RUQ and epigastrium).  absent: Distended





- Extremities Exam


Extremities Exam: Pedal Edema (trace).  absent: Calf Tenderness, Tenderness





- Neurological Exam


Neurological Exam: Alert, Awake, Oriented x3





- Psychiatric Exam


Psychiatric exam: Normal Affect, Normal Mood





- Skin


Skin Exam: Dry, Normal Color, Warm





Assessment and Plan





- Assessment and Plan (Free Text)


Assessment: 





86F RUQ pain w/ cholelithiasis





Plan: 


- IVF/abx


- CLD as tolerated


- GI/DVT ppx


- HIDA scan on Monday


- Encourage ambulation


- further recs per Dr. Montenegro

## 2017-09-16 NOTE — CP.PCM.PN
Subjective





- Date & Time of Evaluation


Date of Evaluation: 09/16/17


Time of Evaluation: 11:27





- Subjective


Subjective: 





Pt doing well this morning. 


Will adjust pain medications, as patient had severe nausea and mild confusion. 


Severe COPD and PulmHTN, will keep pt on NC, on home O2 2L 12 hours PM.


Vitals stable


No acute distress





Objective





- Vital Signs/Intake and Output


Vital Signs (last 24 hours): 


 











Temp Pulse Resp BP Pulse Ox


 


 97.9 F   70   18   158/70 H  91 L


 


 09/16/17 09:15  09/16/17 10:29  09/16/17 09:56  09/16/17 07:32  09/16/17 09:56











- Medications


Medications: 


 Current Medications





Albuterol (Ventolin Hfa 90 Mcg/Actuation (8 G))  2 puff IH Q4H PRN


   PRN Reason: Shortness of Breath


Albuterol/Ipratropium (Duoneb 3 Mg/0.5 Mg (3 Ml) Ud)  3 ml INH RQID [facility]


   Last Admin: 09/16/17 10:29 Dose:  3 ml


Famotidine (Pepcid)  20 mg PO BID [facility]


Sodium Chloride (Sodium Chloride 0.9%)  1,000 mls @ 80 mls/hr IV .[id number] [facility]


   Last Admin: 09/15/17 15:45 Dose:  80 mls/hr


Ciprofloxacin (Cipro 400mg/200ml Dsw)  400 mg in 200 mls @ 200 mls/hr IVPB Q12 

[facility]


   Last Admin: 09/15/17 21:19 Dose:  Not Given


Lactic Acid (Lac-Hydrin 12% Cream (140 G))  1 ea TOP BID [facility]


Morphine Sulfate (Morphine)  2 mg IVP Q6 PRN


   PRN Reason: Pain, moderate (4-7)


Ondansetron HCl (Zofran Inj)  4 mg IVP Q4 PRN


   PRN Reason: Nausea/Vomiting


   Last Admin: 09/15/17 20:42 Dose:  4 mg











- Labs


Labs: 


 





 09/16/17 04:00 





 09/16/17 08:40 





 











PT  11.9 Seconds (9.8-13.1)   09/15/17  12:30    


 


INR  1.2  (0.9-1.2)   09/15/17  12:30    


 


APTT  33.8 Seconds (25.6-37.1)   09/15/17  12:30    














- Constitutional


Appears: Non-toxic, No Acute Distress





- Head Exam


Head Exam: ATRAUMATIC, NORMOCEPHALIC





- Eye Exam


Eye Exam: EOMI, Normal appearance, PERRL





- ENT Exam


ENT Exam: Mucous Membranes Moist, Normal Oropharynx





- Respiratory Exam


Respiratory Exam: Clear to Ausculation Bilateral, NORMAL BREATHING PATTERN.  

absent: Wheezes, Respiratory Distress





- Cardiovascular Exam


Cardiovascular Exam: RRR, +S1, +S2.  absent: Gallop





- GI/Abdominal Exam


GI & Abdominal Exam: Soft, Normal Bowel Sounds, Organomegaly.  absent: 

Tenderness, Mass





- Extremities Exam


Extremities Exam: Normal Capillary Refill, Normal Inspection





- Back Exam


Back Exam: absent: CVA tenderness (L), CVA tenderness (R)





- Neurological Exam


Neurological Exam: Alert, Awake





- Psychiatric Exam


Psychiatric exam: Normal Affect, Normal Mood





- Skin


Skin Exam: Dry, Warm





Assessment and Plan





- Assessment and Plan (Free Text)


Plan: 





85 yo female with history of CAD (2 stents 4 yrs ago), AFib (paroxysmal), COPD (

oxygen dependent) and HTN was seen in the ER because of on and off RUQ pain 

associated with nausea and vomiting for 7 days. Patient was found to be in COPD 

exacerbation in addition to cholelithiasis. Abd US showed cholelithiasis and 

CBD 5mm. Patient seen by surgery Dr. Montenegro, who is planning for HIDA scan and 

possible subsequent surgery. CLD for now with pain control. Optimization of 

respiratory status. 





  


Abdominal pain 2/2 Cholelithiases


surgical consult with Dr Lan Osborn 400 mg IVPB q12


   Zofran 4 mg IVP Q4 PRN 


HIDA for Monday, possible surgery next week


Clear liquid diet


D/C IVF with NSS at 80cc/hr


Cardiology consult with Dr Collado for possible need of cardiac clearance


D/C Morphine 2mg IV q 6hrs prn for pain and switch to Toradol and monitor renal 

function





COPD exacerbation with hypoxia on Home O2


continue O2 supplement via NC at 2LPM


Home O2 2L for 12 hours overnight


Duoneb via nebulizer q 4hrs prn for SOB/wheezing   





Pulmonary Hypertension


ECHO: PulmHTN, RVSP 55mmHg, LA mod dilations, RA mild dilation EF 50-55%, 

diastolic dysfxn


monitor hemodynamics





CAD (coronary artery disease)


HOLD ASA 81mg PO daily.  


HOLD Plavix 75mg PO daily.  


Lipitor 80mg PO HS.  


Zebeta 2.5mg PO daily.  


Isosorbide Mononitrate 30mg PO daily   





Hypertension


BP controlled. 


continue Lisinopril 2.5mg PO daily.  


Zebeta 2.5mg PO daily.  


Isosorbide Mononitrate 30mg PO daily   





Afib


presently in sinus and well controlled rate.


HOLD Plavix 57mg PO daily.  ASA 81mg PO daily   


for possible surgery





GERD?


Famotidine [Pepcid]   20 mg PO BID 





DVT prophylaxis


venodyne boots while on bed  


LOVENOX to be held before surgery when scheduled

## 2017-09-16 NOTE — CARD
--------------- APPROVED REPORT --------------





EKG Measurement

Heart [id number]

AR [id number]

[id number]

[id number]

[id number]



<Conclusion>

Normal sinus rhythm

Nonspecific ST and T wave abnormality

Abnormal ECG

## 2017-09-16 NOTE — CARD
--------------- APPROVED REPORT --------------





EXAM: Two-dimensional and M-mode echocardiogram with Doppler and 

color Doppler.



Other Information 

Quality : GoodRhythm : NSR



INDICATION

Pre-Op 



2D DIMENSIONS 

Left Atrium (2D)3.96   (1.6-4.0cm)IVSd0.78   (0.7-1.1cm)

Aortic Root (2D)3.39   (2.0-3.7cm)LVDd4.56   (3.9-5.9cm)

LVOT Diameter2.02   (1.8-2.4cm)PWd1.09   (0.7-1.1cm)

IVSs1.21   (0.8-1.2cm)LVDs3.84   (2.5-4.0cm)

PWs1.24   (0.8-1.2cm)



M-Mode DIMENSIONS 

Left Atrium (MM)4.80   (2.5-4.0cm)IVSd1.32   (0.7-1.1cm)

Aortic Root3.08   (2.2-3.7cm)LVDd4.64   (4.0-5.6cm)

Aortic Cusp Exc.1.88   (1.5-2.0cm)PWd1.20   (0.7-1.1cm)

IVSs1.28   cmFS (%) 40   %

LVDs2.80   (2.0-3.8cm)PWs1.60   cm



Mitral Valve

MV E Edmthooi193.8cm/sMV E Peak Gr.64mmHgMV DECEL [id number]

MV A Orojwpyl261.3cm/sMV NOU92hnQ/A ratio1.0

MVA (PHT)4.63cm2



TDI

Lateral E' Peak V8.07cm/sMedial E' Peak V10.02cm/sE/Lateral 

E'13.5

E/Medial E'10.9



Pulmonary Valve

PV Peak Ealirulg247.3cm/s



Tricuspid Valve

TR Peak Sbtmhmwv097zn/sRAP [id number] Peak Gr.45mmHg

[id number]



 LEFT VENTRICLE 

The left ventricle is normal size.

There is normal left ventricular wall thickness.

Left ventricle systolic function is borderline.

The Ejection Fraction is 50-55%.

Adequate regional wall motion.

Transmitral Doppler flow pattern is Grade I-abnormal relaxation 

pattern.



 RIGHT VENTRICLE 

The right ventricle is normal size.

There is normal right ventricular wall thickness.

The right ventricular systolic function is normal.



 ATRIA 

The left atrium is moderately dilated.

The right atrium is mildly dilated.



 AORTIC VALVE 

The aortic valve is normal in structure and function.

No aortic regurgitation is present.

There is no aortic valvular stenosis.



 MITRAL VALVE 

The mitral valve is normal in structure.

There is no evidence of mitral valve prolapse.

There is no mitral valve stenosis.

Mitral regurgitation is moderate.



 TRICUSPID VALVE 

The tricuspid valve is normal in structure.

There is moderate tricuspid regurgitation.

Right ventricular systolic pressure is estimated at 74 mmHg. 

There is severe pulmonary hypertension.



 PULMONIC VALVE 

The pulmonary valve is normal in structure and function.

There is trace pulmonic valvular regurgitation.



 GREAT VESSELS 

The aortic root is normal in size.

The IVC is dilated.

The IVC collapses <50% with inspiration.



 PERICARDIAL EFFUSION 

The pericardium appears normal.



<Conclusion>

The left ventricle is normal size.

There is normal left ventricular wall thickness.

Adequate regional wall motion.

Left ventricle systolic function is borderline.

The Ejection Fraction is 50-55%.

Transmitral Doppler flow pattern is Grade I-abnormal relaxation 

pattern.

The left atrium is moderately dilated.

The right atrium is mildly dilated.

Mitral regurgitation is moderate.

There is moderate tricuspid regurgitation.

There is severe pulmonary hypertension.

The IVC is dilated.

The IVC collapses <50% with inspiration.

## 2017-09-17 LAB
ALBUMIN/GLOB SERPL: 1.3 {RATIO} (ref 1–2.1)
ALP SERPL-CCNC: 106 U/L (ref 38–126)
ALT SERPL-CCNC: 31 U/L (ref 9–52)
ARTERIAL PATENCY WRIST A: YES
AST SERPL-CCNC: 28 U/L (ref 14–36)
BILIRUB SERPL-MCNC: 0.6 MG/DL (ref 0.2–1.3)
BUN SERPL-MCNC: 12 MG/DL (ref 7–17)
CALCIUM SERPL-MCNC: 8.7 MG/DL (ref 8.4–10.2)
CHLORIDE SERPL-SCNC: 97 MMOL/L (ref 98–107)
CO2 SERPL-SCNC: 38 MMOL/L (ref 22–30)
COHGB MFR BLD: 2.2 % (ref 0.5–1.5)
ERYTHROCYTE [DISTWIDTH] IN BLOOD BY AUTOMATED COUNT: 15.3 % (ref 11.5–14.5)
GLOBULIN SER-MCNC: 2.6 GM/DL (ref 2.2–3.9)
GLUCOSE SERPL-MCNC: 103 MG/DL (ref 65–105)
HCO3 BLDA-SCNC: 32.4 MMOL/L (ref 21–28)
HCT VFR BLD CALC: 36.4 % (ref 34–47)
HHB: 8.5 % (ref 0–5)
MCH RBC QN AUTO: 28 PG (ref 27–31)
MCHC RBC AUTO-ENTMCNC: 31.5 G/DL (ref 33–37)
MCV RBC AUTO: 89 FL (ref 81–99)
METHGB MFR BLD: 1.7 % (ref 0–3)
O2 CAP BLDA-SCNC: 15.8 ML/DL (ref 16–24)
O2 CT BLDA-SCNC: 14.4 ML/DL (ref 15–23)
PH BLDA: 7.32 [PH] (ref 7.35–7.45)
PLATELET # BLD: 188 K/UL (ref 130–400)
PO2 BLDA: 52 MM/HG (ref 80–100)
POTASSIUM SERPL-SCNC: 4.4 MMOL/L (ref 3.6–5)
PROT SERPL-MCNC: 6 G/DL (ref 6.3–8.2)
SAO2 % BLDA: 87.5 % (ref 95–98)
SODIUM SERPL-SCNC: 140 MMOL/L (ref 132–148)
WBC # BLD AUTO: 7.7 K/UL (ref 4.8–10.8)

## 2017-09-17 RX ADMIN — IPRATROPIUM BROMIDE AND ALBUTEROL SULFATE SCH ML: .5; 3 SOLUTION RESPIRATORY (INHALATION) at 12:08

## 2017-09-17 RX ADMIN — IPRATROPIUM BROMIDE AND ALBUTEROL SULFATE SCH ML: .5; 3 SOLUTION RESPIRATORY (INHALATION) at 16:02

## 2017-09-17 RX ADMIN — AMMONIUM LACTATE SCH APPLIC: 12 CREAM TOPICAL at 08:59

## 2017-09-17 RX ADMIN — CIPROFLOXACIN SCH MLS/HR: 2 INJECTION, SOLUTION INTRAVENOUS at 20:57

## 2017-09-17 RX ADMIN — AMMONIUM LACTATE SCH APPLIC: 12 CREAM TOPICAL at 17:10

## 2017-09-17 RX ADMIN — IPRATROPIUM BROMIDE AND ALBUTEROL SULFATE SCH ML: .5; 3 SOLUTION RESPIRATORY (INHALATION) at 08:00

## 2017-09-17 RX ADMIN — POTASSIUM CHLORIDE, DEXTROSE MONOHYDRATE AND SODIUM CHLORIDE SCH: 150; 5; 450 INJECTION, SOLUTION INTRAVENOUS at 23:00

## 2017-09-17 RX ADMIN — ENOXAPARIN SODIUM SCH MG: 40 INJECTION SUBCUTANEOUS at 08:59

## 2017-09-17 RX ADMIN — POTASSIUM CHLORIDE, DEXTROSE MONOHYDRATE AND SODIUM CHLORIDE SCH MLS/HR: 150; 5; 450 INJECTION, SOLUTION INTRAVENOUS at 18:13

## 2017-09-17 RX ADMIN — CIPROFLOXACIN SCH MLS/HR: 2 INJECTION, SOLUTION INTRAVENOUS at 08:59

## 2017-09-17 RX ADMIN — IPRATROPIUM BROMIDE AND ALBUTEROL SULFATE SCH ML: .5; 3 SOLUTION RESPIRATORY (INHALATION) at 19:48

## 2017-09-17 NOTE — CP.PCM.PN
Subjective





- Date & Time of Evaluation


Date of Evaluation: 09/17/17


Time of Evaluation: 06:45





- Subjective


Subjective: 





General Surgery- Dr. Montenegro





Pt S&E at bedside this AM. NAEO. RUQ pain present however significantly 

decreased. tolerating current diet. Denies F/C CP/SOB N/V/D. 





Objective





- Vital Signs/Intake and Output


Vital Signs (last 24 hours): 


 











Temp Pulse Resp BP Pulse Ox


 


 98.3 F   80   16   154/68 H  86 L


 


 09/17/17 07:34  09/17/17 09:01  09/17/17 10:56  09/17/17 09:01  09/17/17 07:34











- Medications


Medications: 


 Current Medications





Albuterol (Ventolin Hfa 90 Mcg/Actuation (8 G))  2 puff IH Q4H PRN


   PRN Reason: Shortness of Breath


Albuterol/Ipratropium (Duoneb 3 Mg/0.5 Mg (3 Ml) Ud)  3 ml INH RQID [facility]


   Last Admin: 09/17/17 08:00 Dose:  3 ml


Atorvastatin Calcium (Lipitor)  80 mg PO HS [facility]


   Last Admin: 09/16/17 21:22 Dose:  80 mg


Bisoprolol Fumarate (Zebeta)  2.5 mg PO DAILY [facility]


   Last Admin: 09/17/17 09:00 Dose:  2.5 mg


Enoxaparin Sodium (Lovenox)  40 mg SC DAILY [facility]


   PRN Reason: Protocol


   Last Admin: 09/17/17 08:59 Dose:  40 mg


Famotidine (Pepcid)  20 mg PO BID [facility]


   Last Admin: 09/17/17 09:00 Dose:  20 mg


Ciprofloxacin (Cipro 400mg/200ml Dsw)  400 mg in 200 mls @ 200 mls/hr IVPB Q12 

[facility]


   Last Admin: 09/17/17 08:59 Dose:  200 mls/hr


Isosorbide Mononitrate (Imdur Er)  30 mg PO DAILY [facility]


   Last Admin: 09/17/17 09:00 Dose:  30 mg


Ketorolac Tromethamine (Toradol)  15 mg IVP Q6 PRN


   PRN Reason: pain 5-10


Lactic Acid (Lac-Hydrin 12% Cream (140 G))  1 ea TOP BID [facility]


   Last Admin: 09/17/17 08:59 Dose:  1 applic


Lisinopril (Zestril)  2.5 mg PO DAILY [facility]


   Last Admin: 09/17/17 09:01 Dose:  2.5 mg


Ondansetron HCl (Zofran Inj)  4 mg IVP Q4 PRN


   PRN Reason: Nausea/Vomiting


   Last Admin: 09/15/17 20:42 Dose:  4 mg











- Labs


Labs: 


 





 09/17/17 06:00 





 09/17/17 07:00 





 











PT  11.9 Seconds (9.8-13.1)   09/15/17  12:30    


 


INR  1.2  (0.9-1.2)   09/15/17  12:30    


 


APTT  33.8 Seconds (25.6-37.1)   09/15/17  12:30    














- Constitutional


Appears: No Acute Distress





- Head Exam


Head Exam: NORMAL INSPECTION





- Eye Exam


Eye Exam: EOMI





- ENT Exam


ENT Exam: Mucous Membranes Moist





- Respiratory Exam


Respiratory Exam: Prolonged Expiratory Phase, Wheezes.  absent: Accessory 

Muscle Use, Rales, Rhonchi





- Cardiovascular Exam


Cardiovascular Exam: +S1, +S2





- GI/Abdominal Exam


GI & Abdominal Exam: Guarding, Soft, Tenderness.  absent: Distended, Firm, Rigid

, Hernia, Mass





- Neurological Exam


Neurological Exam: Alert, Awake, Oriented x3





- Psychiatric Exam


Psychiatric exam: Normal Affect





- Skin


Skin Exam: Intact, Warm





Assessment and Plan





- Assessment and Plan (Free Text)


Assessment: 





86F RUQ pain w/ cholelithiasis





- IVF/abx


- continue current diet


- NPO midnight for HIDA tomorrow


- GI/DVT ppx


- OOB/IC


- further recs per Dr. Lan Burnett PGY1

## 2017-09-17 NOTE — CP.PCM.PN
Subjective





- Date & Time of Evaluation


Date of Evaluation: 09/17/17


Time of Evaluation: 10:49





- Subjective


Subjective: 





pt slightly lethargic this morning


CO2 elevated


discussed with Dr. Pyle, will start patient on HF 30 35%


no other complaints


no abd pain


tolerating liquid diet


no other complaints


vss nad   





Objective





- Vital Signs/Intake and Output


Vital Signs (last 24 hours): 


 











Temp Pulse Resp BP Pulse Ox


 


 98.3 F   80   18   154/68 H  86 L


 


 09/17/17 07:34  09/17/17 09:01  09/17/17 07:34  09/17/17 09:01  09/17/17 07:34





GEN: WDWN, lethargic, cooperative


HEENT: NCAT, PERRL, EOMI


NECK: supple, no JVD, no lymphadenopathy


CARDIAC: +S1S2 RRR 


LUNG: CTAB No WRR


ABD: SOFT NT ND BSX4 NO MASSES NO HSM


EXT: +pedal pulses, equal strength


NEURO: AAOx3


SKIN  warm, dry


PSYCH normal mood, normal affect





- Medications


Medications: 


 Current Medications





Albuterol (Ventolin Hfa 90 Mcg/Actuation (8 G))  2 puff IH Q4H PRN


   PRN Reason: Shortness of Breath


Albuterol/Ipratropium (Duoneb 3 Mg/0.5 Mg (3 Ml) Ud)  3 ml INH RQID [facility]


   Last Admin: 09/17/17 08:00 Dose:  3 ml


Atorvastatin Calcium (Lipitor)  80 mg PO HS [facility]


   Last Admin: 09/16/17 21:22 Dose:  80 mg


Bisoprolol Fumarate (Zebeta)  2.5 mg PO DAILY [facility]


   Last Admin: 09/17/17 09:00 Dose:  2.5 mg


Enoxaparin Sodium (Lovenox)  40 mg SC DAILY [facility]


   PRN Reason: Protocol


   Last Admin: 09/17/17 08:59 Dose:  40 mg


Famotidine (Pepcid)  20 mg PO BID [facility]


   Last Admin: 09/17/17 09:00 Dose:  20 mg


Ciprofloxacin (Cipro 400mg/200ml Dsw)  400 mg in 200 mls @ 200 mls/hr IVPB Q12 

[facility]


   Last Admin: 09/17/17 08:59 Dose:  200 mls/hr


Isosorbide Mononitrate (Imdur Er)  30 mg PO DAILY [facility]


   Last Admin: 09/17/17 09:00 Dose:  30 mg


Ketorolac Tromethamine (Toradol)  15 mg IVP Q6 PRN


   PRN Reason: pain 5-10


Lactic Acid (Lac-Hydrin 12% Cream (140 G))  1 ea TOP BID [facility]


   Last Admin: 09/17/17 08:59 Dose:  1 applic


Lisinopril (Zestril)  2.5 mg PO DAILY [facility]


   Last Admin: 09/17/17 09:01 Dose:  2.5 mg


Ondansetron HCl (Zofran Inj)  4 mg IVP Q4 PRN


   PRN Reason: Nausea/Vomiting


   Last Admin: 09/15/17 20:42 Dose:  4 mg











- Labs


Labs: 


 





 09/17/17 06:00 





 09/17/17 07:00 





 











PT  11.9 Seconds (9.8-13.1)   09/15/17  12:30    


 


INR  1.2  (0.9-1.2)   09/15/17  12:30    


 


APTT  33.8 Seconds (25.6-37.1)   09/15/17  12:30    














Assessment and Plan





- Assessment and Plan (Free Text)


Plan: 











85 yo female with history of CAD (2 stents 4 yrs ago), AFib (paroxysmal), COPD (

oxygen dependent) and HTN was seen in the ER because of on and off RUQ pain 

associated with nausea and vomiting for 7 days. Patient was found to be in COPD 

exacerbation in addition to cholelithiasis. Abd US showed cholelithiasis and 

CBD 5mm. Patient seen by surgery Dr. Montenegro, who is planning for HIDA scan and 

possible subsequent surgery. CLD for now with pain control. Optimization of 

respiratory status. 





  


Abdominal pain 2/2 Cholelithiases


surgical consult with Dr Lan Osborn 400 mg IVPB q12


   Zofran 4 mg IVP Q4 PRN 


HIDA for tomorrow


NPO after MN


IVF maintenance at 125cc/hr


Cardiology consult with Dr Collado for possible need of cardiac clearance


D/C Morphine 2mg IV q 6hrs prn for pain and switch to Toradol and monitor renal 

function





COPD exacerbation with hypoxia on Home O2


switch O2 supplement via NC at 2LPM to HF 30 at 35% and monitor


Home O2 2L for 12 hours overnight


Duoneb via nebulizer q 4hrs prn for SOB/wheezing   





Pulmonary Hypertension


ECHO: PulmHTN, RVSP 55mmHg, LA mod dilations, RA mild dilation EF 50-55%, 

diastolic dysfxn


monitor hemodynamics





CAD (coronary artery disease)


HOLD ASA 81mg PO daily.  


HOLD Plavix 75mg PO daily.  


Lipitor 80mg PO HS.  


Zebeta 2.5mg PO daily.  


Isosorbide Mononitrate 30mg PO daily   





Hypertension


BP controlled. 


continue Lisinopril 2.5mg PO daily.  


Zebeta 2.5mg PO daily.  


Isosorbide Mononitrate 30mg PO daily   





Afib


presently in sinus and well controlled rate.


HOLD Plavix 57mg PO daily.  ASA 81mg PO daily   


for possible surgery





GERD?


Famotidine [Pepcid]   20 mg PO BID 





DVT prophylaxis


venodyne boots while on bed  


LOVENOX to be held before surgery when scheduled

## 2017-09-18 LAB
ALBUMIN/GLOB SERPL: 1.3 {RATIO} (ref 1–2.1)
ALP SERPL-CCNC: 102 U/L (ref 38–126)
ALT SERPL-CCNC: 32 U/L (ref 9–52)
ARTERIAL  BLOOD GAS MODE: (no result)
ARTERIAL BLOOD FLOW: 35
ARTERIAL PATENCY WRIST A: YES
AST SERPL-CCNC: 19 U/L (ref 14–36)
BASOPHILS # BLD AUTO: 0 K/UL (ref 0–0.2)
BASOPHILS NFR BLD: 0.2 % (ref 0–2)
BILIRUB SERPL-MCNC: 0.6 MG/DL (ref 0.2–1.3)
BUN SERPL-MCNC: 7 MG/DL (ref 7–17)
CALCIUM SERPL-MCNC: 8.8 MG/DL (ref 8.4–10.2)
CHLORIDE SERPL-SCNC: 99 MMOL/L (ref 98–107)
CO2 SERPL-SCNC: 34 MMOL/L (ref 22–30)
COHGB MFR BLD: 2.4 % (ref 0.5–1.5)
EOSINOPHIL # BLD AUTO: 0.2 K/UL (ref 0–0.7)
EOSINOPHIL NFR BLD: 2.6 % (ref 0–4)
ERYTHROCYTE [DISTWIDTH] IN BLOOD BY AUTOMATED COUNT: 14.8 % (ref 11.5–14.5)
GLOBULIN SER-MCNC: 2.7 GM/DL (ref 2.2–3.9)
GLUCOSE SERPL-MCNC: 140 MG/DL (ref 65–105)
HCO3 BLDA-SCNC: 33.7 MMOL/L (ref 21–28)
HCT VFR BLD CALC: 36.7 % (ref 34–47)
HHB: 9.8 % (ref 0–5)
LYMPHOCYTES # BLD AUTO: 0.6 K/UL (ref 1–4.3)
LYMPHOCYTES NFR BLD AUTO: 8.1 % (ref 20–40)
MCH RBC QN AUTO: 27.4 PG (ref 27–31)
MCHC RBC AUTO-ENTMCNC: 31.1 G/DL (ref 33–37)
MCV RBC AUTO: 87.9 FL (ref 81–99)
METHGB MFR BLD: 1.2 % (ref 0–3)
MONOCYTES # BLD: 0.6 K/UL (ref 0–0.8)
MONOCYTES NFR BLD: 8.7 % (ref 0–10)
NEUTROPHILS # BLD: 5.8 K/UL (ref 1.8–7)
NEUTROPHILS NFR BLD AUTO: 80.4 % (ref 50–75)
NRBC BLD AUTO-RTO: 0 % (ref 0–0)
O2 CAP BLDA-SCNC: 16 ML/DL (ref 16–24)
O2 CT BLDA-SCNC: 14.4 ML/DL (ref 15–23)
PH BLDA: 7.37 [PH] (ref 7.35–7.45)
PLATELET # BLD: 180 K/UL (ref 130–400)
PMV BLD AUTO: 7.8 FL (ref 7.2–11.7)
PO2 BLDA: 50 MM/HG (ref 80–100)
POTASSIUM SERPL-SCNC: 4.5 MMOL/L (ref 3.6–5)
PROT SERPL-MCNC: 6.1 G/DL (ref 6.3–8.2)
SAO2 % BLDA: 86.6 % (ref 95–98)
SODIUM SERPL-SCNC: 139 MMOL/L (ref 132–148)
WBC # BLD AUTO: 7.3 K/UL (ref 4.8–10.8)

## 2017-09-18 PROCEDURE — 5A09457 ASSISTANCE WITH RESPIRATORY VENTILATION, 24-96 CONSECUTIVE HOURS, CONTINUOUS POSITIVE AIRWAY PRESSURE: ICD-10-PCS | Performed by: GENERAL ACUTE CARE HOSPITAL

## 2017-09-18 RX ADMIN — AMMONIUM LACTATE SCH APPLIC: 12 CREAM TOPICAL at 10:53

## 2017-09-18 RX ADMIN — POTASSIUM CHLORIDE, DEXTROSE MONOHYDRATE AND SODIUM CHLORIDE SCH: 150; 5; 450 INJECTION, SOLUTION INTRAVENOUS at 07:00

## 2017-09-18 RX ADMIN — CIPROFLOXACIN SCH MLS/HR: 2 INJECTION, SOLUTION INTRAVENOUS at 10:53

## 2017-09-18 RX ADMIN — CIPROFLOXACIN SCH MLS/HR: 2 INJECTION, SOLUTION INTRAVENOUS at 21:05

## 2017-09-18 RX ADMIN — AMMONIUM LACTATE SCH APPLIC: 12 CREAM TOPICAL at 17:36

## 2017-09-18 RX ADMIN — IPRATROPIUM BROMIDE AND ALBUTEROL SULFATE SCH ML: .5; 3 SOLUTION RESPIRATORY (INHALATION) at 15:30

## 2017-09-18 RX ADMIN — IPRATROPIUM BROMIDE AND ALBUTEROL SULFATE SCH ML: .5; 3 SOLUTION RESPIRATORY (INHALATION) at 19:16

## 2017-09-18 RX ADMIN — POTASSIUM CHLORIDE, DEXTROSE MONOHYDRATE AND SODIUM CHLORIDE SCH MLS/HR: 150; 5; 450 INJECTION, SOLUTION INTRAVENOUS at 02:53

## 2017-09-18 RX ADMIN — ENOXAPARIN SODIUM SCH MG: 40 INJECTION SUBCUTANEOUS at 11:04

## 2017-09-18 RX ADMIN — IPRATROPIUM BROMIDE AND ALBUTEROL SULFATE SCH ML: .5; 3 SOLUTION RESPIRATORY (INHALATION) at 07:50

## 2017-09-18 RX ADMIN — IPRATROPIUM BROMIDE AND ALBUTEROL SULFATE SCH ML: .5; 3 SOLUTION RESPIRATORY (INHALATION) at 11:20

## 2017-09-18 NOTE — NM
PROCEDURE:  Nuclear Medicine Hepatobiliary Scan



HISTORY:

r/o acute cholecystitis



COMPARISON:

Comparison is made to the previous ultrasound dated 09/15/2017 

previous CT dated 09/13/2017 



TECHNIQUE:

5.36 mCi of technetium 99m Mebrofenin was administered intravenously. 

Planar images of the abdomen were obtained at 5 min intervals to 60 

mins. Delayed images were also obtained.



FINDINGS:

LIVER: Timely and homogenous uptake.



COMMON BILE DUCT: identified at 5 mins.



GALLBLADDER: identified at 10 mins.



SMALL BOWEL: Identified at 10 mins.



IMPRESSION:

No scintigraphic evidence of acute cholecystitis. . The cystic duct 

is patent.

## 2017-09-18 NOTE — CP.PCM.PN
Subjective





- Date & Time of Evaluation


Date of Evaluation: 09/18/17


Time of Evaluation: 09:55





- Subjective


Subjective: 





Had HIDA scan done this morning.


Abdominal pain has improved, no further nausea or vomiting.


Awaiting cardiology evaluation.


Vital signs remain stable, no fever.


Remains hypoxemic and hypercapnic with compensated pH.


Total CO2 has decreased to 34.





Abdomen is soft with + BS.


Breath sounds are diminished bilaterally with dry rales in LL's.


No audible wheezing or bronchial breathing.


Trace dependant edema both LE's, no cyanosis.





Await HIDA scan results.


As per surgery.


Will need VQ scan at some point in time to R/O CTEPH.








Objective





- Vital Signs/Intake and Output


Vital Signs (last 24 hours): 


 











Temp Pulse Resp BP Pulse Ox


 


 98.4 F   78   18   168/79 H  87 L


 


 09/18/17 07:33  09/18/17 07:52  09/18/17 07:33  09/18/17 07:33  09/18/17 07:33








Intake and Output: 


 











 09/17/17 09/18/17





 23:59 11:59


 


Intake Total  1500


 


Balance  1500














- Medications


Medications: 


 Current Medications





Albuterol (Ventolin Hfa 90 Mcg/Actuation (8 G))  2 puff IH Q4H PRN


   PRN Reason: Shortness of Breath


Albuterol/Ipratropium (Duoneb 3 Mg/0.5 Mg (3 Ml) Ud)  3 ml INH RQID [facility]


   Last Admin: 09/18/17 07:50 Dose:  3 ml


Atorvastatin Calcium (Lipitor)  80 mg PO HS [facility]


   Last Admin: 09/17/17 21:47 Dose:  80 mg


Bisoprolol Fumarate (Zebeta)  2.5 mg PO DAILY [facility]


   Last Admin: 09/17/17 09:00 Dose:  2.5 mg


Enoxaparin Sodium (Lovenox)  40 mg SC DAILY [facility]


   PRN Reason: Protocol


   Last Admin: 09/17/17 08:59 Dose:  40 mg


Famotidine (Pepcid)  20 mg PO BID [facility]


   Last Admin: 09/17/17 17:10 Dose:  20 mg


Ciprofloxacin (Cipro 400mg/200ml Dsw)  400 mg in 200 mls @ 200 mls/hr IVPB Q12 

[facility]


   Last Admin: 09/17/17 20:57 Dose:  200 mls/hr


Potassium Chloride/Dextrose/Sod Cl (Potassium Chl 20 Meq In D5-1/2ns)  1,000 

mls @ 125 mls/hr IV .Q8H [facility]


   Stop: 09/18/17 14:51


   Last Admin: 09/18/17 02:53 Dose:  125 mls/hr


Isosorbide Mononitrate (Imdur Er)  30 mg PO DAILY [facility]


   Last Admin: 09/17/17 09:00 Dose:  30 mg


Ketorolac Tromethamine (Toradol)  15 mg IVP Q6 PRN


   PRN Reason: pain 5-10


Lactic Acid (Lac-Hydrin 12% Cream (140 G))  1 ea TOP BID [facility]


   Last Admin: 09/17/17 17:10 Dose:  1 applic


Lisinopril (Zestril)  2.5 mg PO DAILY [facility]


   Last Admin: 09/17/17 09:01 Dose:  2.5 mg


Ondansetron HCl (Zofran Inj)  4 mg IVP Q4 PRN


   PRN Reason: Nausea/Vomiting


   Last Admin: 09/15/17 20:42 Dose:  4 mg











- Labs


Labs: 


 





 09/18/17 06:40 





 09/18/17 06:40 





 











PT  11.9 Seconds (9.8-13.1)   09/15/17  12:30    


 


INR  1.2  (0.9-1.2)   09/15/17  12:30    


 


APTT  33.8 Seconds (25.6-37.1)   09/15/17  12:30    














Assessment and Plan


(1) COPD (chronic obstructive pulmonary disease)


Status: Chronic   





(2) Pulmonary fibrosis, unspecified


Status: Chronic   





(3) Hypoxemia requiring supplemental oxygen


Status: Chronic   





(4) Abdominal pain


Status: Acute   





(5) Cholelithiases


Status: Acute   





(6) CAD (coronary artery disease)


Status: Chronic   





(7) Kyphosis (acquired) (postural)


Status: Chronic   





(8) Exogenous obesity


Status: Chronic   





(9) Anemia


Status: Acute

## 2017-09-18 NOTE — CP.PCM.CON
History of Present Illness





- History of Present Illness


History of Present Illness: 





87 yo female admitted with RUQ and gallstones. HIDA is pending. pmh CAD s/p 

PTCA 2013 , Copd denies cp. Baseline sob on home 02. Currently on CPAP and 

alert and verbal. Stable hemodynamically.





Past Patient History





- Infectious Disease


Hx of Infectious Diseases: None





- Tetanus Immunizations


Tetanus Immunization: Unknown





- Past Medical History & Family History


Past Medical History?: Yes


Past Family History: Reviewed and not pertinent





- Past Social History


Smoking Status: Former Smoker (stopped 2013)


Chewing Tobacco Use: No


Cigar Use: No


Alcohol: Social


Drugs: Denies


Home Situation {Lives}: Alone





- CARDIAC


Hx Atrial Fibrillation: Yes


Hx Congestive Heart Failure: Yes


Hx Heart Attack: Yes


Hx Hypercholesterolemia: Yes


Hx Hypertension: Yes


Hx Peripheral Edema: Yes





- PULMONARY


Hx Chronic Obstructive Pulmonary Disease (COPD): Yes


Hx Emphysema: Yes


Hx Pneumonia: Yes





- NEUROLOGICAL


Hx Neurological Disorder: No





- HEENT


Hx HEENT Problems: No





- RENAL


Hx Chronic Kidney Disease: No





- ENDOCRINE/METABOLIC


Hx Endocrine Disorders: No





- HEMATOLOGICAL/ONCOLOGICAL


Hx Blood Disorders: No


Hx Human Immunodeficiency Virus (HIV): No





- INTEGUMENTARY


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





- MUSCULOSKELETAL/RHEUMATOLOGICAL


Hx Falls: Yes


Hx Fractures: Yes (right ankle)





- GASTROINTESTINAL


Hx Gastrointestinal Disorders: No





- GENITOURINARY/GYNECOLOGICAL


Hx Genitourinary Disorders: Yes


Other/Comment: Stress Inc





- PSYCHIATRIC


Hx Psychophysiologic Disorder: No


Hx Substance Use: No





- SURGICAL HISTORY


Hx Appendectomy: No


Hx Coronary Stent: Yes (x 2)





- ANESTHESIA


Hx Anesthesia: Yes


Hx Anesthesia Reactions: No


Hx Malignant Hyperthermia: No





Meds


Allergies/Adverse Reactions: 


 Allergies











Allergy/AdvReac Type Severity Reaction Status Date / Time


 


Penicillins Allergy Mild SWELLING Verified 10/05/16 09:43














- Medications


Medications: 


 Current Medications





Albuterol (Ventolin Hfa 90 Mcg/Actuation (8 G))  2 puff IH Q4H PRN


   PRN Reason: Shortness of Breath


Albuterol/Ipratropium (Duoneb 3 Mg/0.5 Mg (3 Ml) Ud)  3 ml INH RQID [facility]


   Last Admin: 09/18/17 11:20 Dose:  3 ml


Atorvastatin Calcium (Lipitor)  80 mg PO HS [facility]


   Last Admin: 09/17/17 21:47 Dose:  80 mg


Bisoprolol Fumarate (Zebeta)  2.5 mg PO DAILY [facility]


   Last Admin: 09/18/17 10:54 Dose:  2.5 mg


Enoxaparin Sodium (Lovenox)  40 mg SC DAILY [facility]


   PRN Reason: Protocol


   Last Admin: 09/18/17 11:04 Dose:  40 mg


Famotidine (Pepcid)  20 mg PO BID [facility]


   Last Admin: 09/18/17 10:44 Dose:  20 mg


Ciprofloxacin (Cipro 400mg/200ml Dsw)  400 mg in 200 mls @ 200 mls/hr IVPB Q12 

[facility]


   Last Admin: 09/18/17 10:53 Dose:  200 mls/hr


Potassium Chloride/Dextrose/Sod Cl (Potassium Chl 20 Meq In D5-1/2ns)  1,000 

mls @ 125 mls/hr IV .Q8H [facility]


   Stop: 09/18/17 14:51


   Last Admin: 09/18/17 02:53 Dose:  125 mls/hr


Isosorbide Mononitrate (Imdur Er)  30 mg PO DAILY [facility]


   Last Admin: 09/18/17 10:53 Dose:  30 mg


Ketorolac Tromethamine (Toradol)  15 mg IVP Q6 PRN


   PRN Reason: pain 5-10


Lactic Acid (Lac-Hydrin 12% Cream (140 G))  1 ea TOP BID [facility]


   Last Admin: 09/18/17 10:53 Dose:  1 applic


Lisinopril (Zestril)  2.5 mg PO DAILY [facility]


   Last Admin: 09/18/17 10:44 Dose:  2.5 mg


Ondansetron HCl (Zofran Inj)  4 mg IVP Q4 PRN


   PRN Reason: Nausea/Vomiting


   Last Admin: 09/15/17 20:42 Dose:  4 mg











Physical Exam





- Neck Exam


Neck exam: Positive for: Normal Inspection





- Respiratory Exam


Respiratory Exam: Clear to Auscultation Bilateral





- Cardiovascular Exam


Cardiovascular Exam: REGULAR RHYTHM





- GI/Abdominal Exam


GI & Abdominal Exam: Normal Bowel Sounds





- Extremities Exam


Extremities exam: Positive for: normal inspection





Results





- Vital Signs


Recent Vital Signs: 


 Last Vital Signs











Temp  98.4 F   09/18/17 07:33


 


Pulse  78   09/18/17 07:52


 


Resp  18   09/18/17 07:33


 


BP  168/79 H  09/18/17 07:33


 


Pulse Ox  87 L  09/18/17 07:33














- Labs


Result Diagrams: 


 09/18/17 06:40





 09/18/17 06:40


Labs: 


 Laboratory Results - last 24 hr











  09/18/17 09/18/17 09/18/17





  06:20 06:40 06:40


 


WBC   7.3 


 


RBC   4.17 


 


Hgb   11.4 L 


 


Hct   36.7 


 


MCV   87.9 


 


MCH   27.4 


 


MCHC   31.1 L 


 


RDW   14.8 H 


 


Plt Count   180 


 


MPV   7.8 


 


Neut % (Auto)   80.4 H 


 


Lymph % (Auto)   8.1 L 


 


Mono % (Auto)   8.7 


 


Eos % (Auto)   2.6 


 


Baso % (Auto)   0.2 


 


Neut #   5.8 


 


Lymph #   0.6 L 


 


Mono #   0.6 


 


Eos #   0.2 


 


Baso #   0.0 


 


pCO2  68 H  


 


pO2  50 L  


 


HCO3  33.7 H  


 


ABG pH  7.37  


 


ABG Total CO2  41.4 H  


 


ABG O2 Saturation  89.8 L  


 


ABG O2 Content  14.4 L  


 


ABG Base Excess  11.5 H  


 


ABG Hemoglobin  11.8  


 


ABG Carboxyhemoglobin  2.4 H  


 


POC ABG HHb (Measured)  9.8 H  


 


ABG Methemoglobin  1.2  


 


ABG O2 Capacity  16.0  


 


Chito Test  Yes  


 


A-a O2 Difference  79.0  


 


Hgb O2 Saturation  86.6 L  


 


Liter Flow  35  


 


Vent Mode  High flow lpm  


 


FiO2  30.0  


 


Sodium    139


 


Potassium    4.5


 


Chloride    99


 


Carbon Dioxide    34 H


 


Anion Gap    11


 


BUN    7


 


Creatinine    0.6 L


 


Est GFR ( Amer)    > 60


 


Est GFR (Non-Af Amer)    > 60


 


Random Glucose    140 H


 


Calcium    8.8


 


Total Bilirubin    0.6


 


AST    19


 


ALT    32


 


Alkaline Phosphatase    102


 


Total Protein    6.1 L


 


Albumin    3.4 L


 


Globulin    2.7


 


Albumin/Globulin Ratio    1.3














Assessment & Plan





- Assessment and Plan (Free Text)


Assessment: 





Hemodynamically stable. No c/o cp resting comfortably. ECHO: Normal LV function

, Mod MR, with Severe Pulmonary Hypertension PAP est 55mmHG


Pulmonary issues addressed.


Plavix has been held in anticipation of surgery


Pt is at acceptable risk for cholecystectomy if clinically indicated.


She has no unstable cardiac symptoms.

## 2017-09-18 NOTE — CP.PCM.PN
Subjective





- Date & Time of Evaluation


Date of Evaluation: 09/18/17


Time of Evaluation: 07:57





- Subjective


Subjective: 





pt slightly lethargic and hypoxic this AM


ABG only mildly improved from yesterday


will start pt on BIPAP 10/5 14 35% and reevaluate this afternoon


pt for HIDA today 


will f/u surgery recs





Objective





- Vital Signs/Intake and Output


Vital Signs (last 24 hours): 


 











Temp Pulse Resp BP Pulse Ox


 


 98.4 F   78   18   168/79 H  87 L


 


 09/18/17 07:33  09/18/17 07:52  09/18/17 07:33  09/18/17 07:33  09/18/17 07:33








Intake and Output: 


 











 09/18/17 09/18/17





 06:59 18:59


 


Intake Total 1500 


 


Balance 1500 














- Medications


Medications: 


 Current Medications





Albuterol (Ventolin Hfa 90 Mcg/Actuation (8 G))  2 puff IH Q4H PRN


   PRN Reason: Shortness of Breath


Albuterol/Ipratropium (Duoneb 3 Mg/0.5 Mg (3 Ml) Ud)  3 ml INH RQID [facility]


   Last Admin: 09/18/17 07:50 Dose:  3 ml


Atorvastatin Calcium (Lipitor)  80 mg PO HS [facility]


   Last Admin: 09/17/17 21:47 Dose:  80 mg


Bisoprolol Fumarate (Zebeta)  2.5 mg PO DAILY [facility]


   Last Admin: 09/17/17 09:00 Dose:  2.5 mg


Enoxaparin Sodium (Lovenox)  40 mg SC DAILY [facility]


   PRN Reason: Protocol


   Last Admin: 09/17/17 08:59 Dose:  40 mg


Famotidine (Pepcid)  20 mg PO BID [facility]


   Last Admin: 09/17/17 17:10 Dose:  20 mg


Ciprofloxacin (Cipro 400mg/200ml Dsw)  400 mg in 200 mls @ 200 mls/hr IVPB Q12 

[facility]


   Last Admin: 09/17/17 20:57 Dose:  200 mls/hr


Potassium Chloride/Dextrose/Sod Cl (Potassium Chl 20 Meq In D5-1/2ns)  1,000 

mls @ 125 mls/hr IV .Q8H [facility]


   Stop: 09/18/17 14:51


   Last Admin: 09/18/17 02:53 Dose:  125 mls/hr


Isosorbide Mononitrate (Imdur Er)  30 mg PO DAILY [facility]


   Last Admin: 09/17/17 09:00 Dose:  30 mg


Ketorolac Tromethamine (Toradol)  15 mg IVP Q6 PRN


   PRN Reason: pain 5-10


Lactic Acid (Lac-Hydrin 12% Cream (140 G))  1 ea TOP BID [facility]


   Last Admin: 09/17/17 17:10 Dose:  1 applic


Lisinopril (Zestril)  2.5 mg PO DAILY [facility]


   Last Admin: 09/17/17 09:01 Dose:  2.5 mg


Ondansetron HCl (Zofran Inj)  4 mg IVP Q4 PRN


   PRN Reason: Nausea/Vomiting


   Last Admin: 09/15/17 20:42 Dose:  4 mg











- Labs


Labs: 


 





 09/18/17 06:40 





 09/18/17 06:40 





 











PT  11.9 Seconds (9.8-13.1)   09/15/17  12:30    


 


INR  1.2  (0.9-1.2)   09/15/17  12:30    


 


APTT  33.8 Seconds (25.6-37.1)   09/15/17  12:30    














Assessment and Plan





- Assessment and Plan (Free Text)


Plan: 











85 yo female with history of CAD (2 stents 4 yrs ago), AFib (paroxysmal), COPD (

oxygen dependent) and HTN was seen in the ER because of on and off RUQ pain 

associated with nausea and vomiting for 7 days. Patient was found to be in COPD 

exacerbation in addition to cholelithiasis. Abd US showed cholelithiasis and 

CBD 5mm. Patient seen by surgery Dr. Montenegro, who is planning for HIDA scan and 

possible subsequent surgery. CLD for now with pain control. Optimization of 

respiratory status. 





  


Abdominal pain 2/2 Cholelithiases


surgical consult with Dr Lan Osborn 400 mg IVPB q12


   Zofran 4 mg IVP Q4 PRN 


HIDA TODAY   


NPO after MN


IVF maintenance at 125cc/hr


Cardiology consult with Dr Collado for possible need of cardiac clearance, ECHO 

completed


D/C Morphine 2mg IV q 6hrs prn for pain and switch to Toradol and monitor renal 

function





COPD exacerbation with hypoxia on Home O2


on admssion pt on O2 supplement via NC at 2LPM then transitioned to  HF 30 at 35

% and monitor


9/18 pt hypoxic with hypercarbia, unimproved, initiate BIPAP TODAY 10/5 14 35%


baseline Home O2 2L for 12 hours overnight


Duoneb via nebulizer q 4hrs prn for SOB/wheezing   





Pulmonary Hypertension


ECHO: PulmHTN, RVSP 55mmHg, LA mod dilations, RA mild dilation EF 50-55%, 

diastolic dysfxn


monitor hemodynamics





CAD (coronary artery disease)


HOLD ASA 81mg PO daily.  


HOLD Plavix 75mg PO daily.  


Lipitor 80mg PO HS.  


Zebeta 2.5mg PO daily.  


Isosorbide Mononitrate 30mg PO daily   





Hypertension


BP controlled. 


continue Lisinopril 2.5mg PO daily.  


Zebeta 2.5mg PO daily.  


Isosorbide Mononitrate 30mg PO daily   





Afib


presently in sinus and well controlled rate.


HOLD Plavix 57mg PO daily.  ASA 81mg PO daily   


for possible surgery





GERD?


Famotidine [Pepcid]   20 mg PO BID 





DVT prophylaxis


venodyne boots while on bed  


LOVENOX to be held before surgery when scheduled

## 2017-09-18 NOTE — CP.PCM.PN
<Yesenia Rodrigez - Last Filed: 09/18/17 11:57>





Subjective





- Date & Time of Evaluation


Date of Evaluation: 09/18/17


Time of Evaluation: 06:40





- Subjective


Subjective: 





Patient seen and examined this AM. NAEO. Patient denies pain, nausea, vomiting, 

but is very hungry. 





Objective





- Vital Signs/Intake and Output


Vital Signs (last 24 hours): 


 











Temp Pulse Resp BP Pulse Ox


 


 98.6 F   72   18   132/69   86 L


 


 09/18/17 00:32  09/18/17 00:32  09/18/17 04:48  09/18/17 00:32  09/18/17 00:32








Intake and Output: 


 











 09/18/17 09/18/17





 06:59 18:59


 


Intake Total 1500 


 


Balance 1500 














- Medications


Medications: 


 Current Medications





Albuterol (Ventolin Hfa 90 Mcg/Actuation (8 G))  2 puff IH Q4H PRN


   PRN Reason: Shortness of Breath


Albuterol/Ipratropium (Duoneb 3 Mg/0.5 Mg (3 Ml) Ud)  3 ml INH RQID [facility]


   Last Admin: 09/17/17 19:48 Dose:  3 ml


Atorvastatin Calcium (Lipitor)  80 mg PO HS [facility]


   Last Admin: 09/17/17 21:47 Dose:  80 mg


Bisoprolol Fumarate (Zebeta)  2.5 mg PO DAILY [facility]


   Last Admin: 09/17/17 09:00 Dose:  2.5 mg


Enoxaparin Sodium (Lovenox)  40 mg SC DAILY [facility]


   PRN Reason: Protocol


   Last Admin: 09/17/17 08:59 Dose:  40 mg


Famotidine (Pepcid)  20 mg PO BID [facility]


   Last Admin: 09/17/17 17:10 Dose:  20 mg


Ciprofloxacin (Cipro 400mg/200ml Dsw)  400 mg in 200 mls @ 200 mls/hr IVPB Q12 

[facility]


   Last Admin: 09/17/17 20:57 Dose:  200 mls/hr


Potassium Chloride/Dextrose/Sod Cl (Potassium Chl 20 Meq In D5-1/2ns)  1,000 

mls @ 125 mls/hr IV .Q8H [facility]


   Stop: 09/18/17 14:51


   Last Admin: 09/18/17 02:53 Dose:  125 mls/hr


Isosorbide Mononitrate (Imdur Er)  30 mg PO DAILY [facility]


   Last Admin: 09/17/17 09:00 Dose:  30 mg


Ketorolac Tromethamine (Toradol)  15 mg IVP Q6 PRN


   PRN Reason: pain 5-10


Lactic Acid (Lac-Hydrin 12% Cream (140 G))  1 ea TOP BID [facility]


   Last Admin: 09/17/17 17:10 Dose:  1 applic


Lisinopril (Zestril)  2.5 mg PO DAILY [facility]


   Last Admin: 09/17/17 09:01 Dose:  2.5 mg


Ondansetron HCl (Zofran Inj)  4 mg IVP Q4 PRN


   PRN Reason: Nausea/Vomiting


   Last Admin: 09/15/17 20:42 Dose:  4 mg











- Labs


Labs: 


 





 09/18/17 06:40 





 09/18/17 06:40 





 











PT  11.9 Seconds (9.8-13.1)   09/15/17  12:30    


 


INR  1.2  (0.9-1.2)   09/15/17  12:30    


 


APTT  33.8 Seconds (25.6-37.1)   09/15/17  12:30    














Assessment and Plan





- Assessment and Plan (Free Text)


Assessment: 


86F RUQ pain w/ cholelithiasis


HIDA scan with gallbladder visualization





Plan:








Discussed with Dr. Montenegro








<Brant Hernandez - Last Filed: 09/18/17 13:36>





Subjective





- Date & Time of Evaluation


Time of Evaluation: 13:10





- Subjective


Subjective: 





Patient was seen and examined at the bedside.  Agree with resident's note above





Objective





- Vital Signs/Intake and Output


Vital Signs (last 24 hours): 


 











Temp Pulse Resp BP Pulse Ox


 


 98.4 F   80   18   168/79 H  87 L


 


 09/18/17 07:33  09/18/17 11:24  09/18/17 07:33  09/18/17 07:33  09/18/17 07:33








Intake and Output: 


 











 09/18/17 09/18/17





 06:59 18:59


 


Intake Total 1500 


 


Balance 1500 














- Medications


Medications: 


 Current Medications





Albuterol (Ventolin Hfa 90 Mcg/Actuation (8 G))  2 puff IH Q4H PRN


   PRN Reason: Shortness of Breath


Albuterol/Ipratropium (Duoneb 3 Mg/0.5 Mg (3 Ml) Ud)  3 ml INH RQID [facility]


   Last Admin: 09/18/17 11:20 Dose:  3 ml


Atorvastatin Calcium (Lipitor)  80 mg PO HS [facility]


   Last Admin: 09/17/17 21:47 Dose:  80 mg


Bisoprolol Fumarate (Zebeta)  2.5 mg PO DAILY [facility]


   Last Admin: 09/18/17 10:54 Dose:  2.5 mg


Enoxaparin Sodium (Lovenox)  40 mg SC DAILY [facility]


   PRN Reason: Protocol


   Last Admin: 09/18/17 11:04 Dose:  40 mg


Famotidine (Pepcid)  20 mg PO BID [facility]


   Last Admin: 09/18/17 10:44 Dose:  20 mg


Ciprofloxacin (Cipro 400mg/200ml Dsw)  400 mg in 200 mls @ 200 mls/hr IVPB Q12 

[facility]


   Last Admin: 09/18/17 10:53 Dose:  200 mls/hr


Potassium Chloride/Dextrose/Sod Cl (Potassium Chl 20 Meq In D5-1/2ns)  1,000 

mls @ 125 mls/hr IV .Q8H [facility]


   Stop: 09/18/17 14:51


   Last Admin: 09/18/17 02:53 Dose:  125 mls/hr


Isosorbide Mononitrate (Imdur Er)  30 mg PO DAILY [facility]


   Last Admin: 09/18/17 10:53 Dose:  30 mg


Ketorolac Tromethamine (Toradol)  15 mg IVP Q6 PRN


   PRN Reason: pain 5-10


Lactic Acid (Lac-Hydrin 12% Cream (140 G))  1 ea TOP BID [facility]


   Last Admin: 09/18/17 10:53 Dose:  1 applic


Lisinopril (Zestril)  2.5 mg PO DAILY [facility]


   Last Admin: 09/18/17 10:44 Dose:  2.5 mg


Ondansetron HCl (Zofran Inj)  4 mg IVP Q4 PRN


   PRN Reason: Nausea/Vomiting


   Last Admin: 09/15/17 20:42 Dose:  4 mg











- Labs


Labs: 


 





 09/18/17 06:40 





 09/18/17 06:40 





 











PT  11.9 Seconds (9.8-13.1)   09/15/17  12:30    


 


INR  1.2  (0.9-1.2)   09/15/17  12:30    


 


APTT  33.8 Seconds (25.6-37.1)   09/15/17  12:30    














Assessment and Plan





- Assessment and Plan (Free Text)


Plan: 





- pain control


- Awaiting on the official results of the HIDA scan


- Patient will need pulmonary medicine clearance prior to proceeding with any 

surgery


- Repeat labs in am


- Continue care as per medical team


- Will follow

## 2017-09-19 LAB
ALBUMIN/GLOB SERPL: 1.3 {RATIO} (ref 1–2.1)
ALP SERPL-CCNC: 104 U/L (ref 38–126)
ALT SERPL-CCNC: 32 U/L (ref 9–52)
ARTERIAL PATENCY WRIST A: YES
AST SERPL-CCNC: 20 U/L (ref 14–36)
BASOPHILS # BLD AUTO: 0 K/UL (ref 0–0.2)
BASOPHILS NFR BLD: 0.2 % (ref 0–2)
BASOPHILS NFR BLD: 1 % (ref 0–2)
BILIRUB SERPL-MCNC: 0.7 MG/DL (ref 0.2–1.3)
BUN SERPL-MCNC: 7 MG/DL (ref 7–17)
CALCIUM SERPL-MCNC: 9 MG/DL (ref 8.4–10.2)
CHLORIDE SERPL-SCNC: 99 MMOL/L (ref 98–107)
CO2 SERPL-SCNC: 34 MMOL/L (ref 22–30)
COHGB MFR BLD: 2.3 % (ref 0.5–1.5)
EOSINOPHIL # BLD AUTO: 0.3 K/UL (ref 0–0.7)
EOSINOPHIL NFR BLD: 3 % (ref 0–7)
EOSINOPHIL NFR BLD: 3.7 % (ref 0–4)
ERYTHROCYTE [DISTWIDTH] IN BLOOD BY AUTOMATED COUNT: 15.3 % (ref 11.5–14.5)
GLOBULIN SER-MCNC: 2.6 GM/DL (ref 2.2–3.9)
GLUCOSE SERPL-MCNC: 107 MG/DL (ref 65–105)
HCO3 BLDA-SCNC: 34.9 MMOL/L (ref 21–28)
HCT VFR BLD CALC: 37.7 % (ref 34–47)
HHB: 7.6 % (ref 0–5)
LG PLATELETS BLD QL SMEAR: PRESENT
LYMPHOCYTES # BLD AUTO: 0.7 K/UL (ref 1–4.3)
LYMPHOCYTES NFR BLD AUTO: 9.6 % (ref 20–40)
MCH RBC QN AUTO: 28.3 PG (ref 27–31)
MCHC RBC AUTO-ENTMCNC: 32.4 G/DL (ref 33–37)
MCV RBC AUTO: 87.4 FL (ref 81–99)
METHGB MFR BLD: 1.8 % (ref 0–3)
MONOCYTES # BLD: 0.6 K/UL (ref 0–0.8)
MONOCYTES NFR BLD: 8.6 % (ref 0–10)
NEUTROPHILS # BLD: 5.5 K/UL (ref 1.8–7)
NEUTROPHILS NFR BLD AUTO: 75 % (ref 42–75)
NEUTROPHILS NFR BLD AUTO: 77.9 % (ref 50–75)
NRBC BLD AUTO-RTO: 0 % (ref 0–0)
O2 CAP BLDA-SCNC: 16.5 ML/DL (ref 16–24)
O2 CT BLDA-SCNC: 15.2 ML/DL (ref 15–23)
PH BLDA: 7.43 [PH] (ref 7.35–7.45)
PLATELET # BLD: 184 K/UL (ref 130–400)
PMV BLD AUTO: 8.1 FL (ref 7.2–11.7)
PO2 BLDA: 53 MM/HG (ref 80–100)
POTASSIUM SERPL-SCNC: 4 MMOL/L (ref 3.6–5)
PROT SERPL-MCNC: 6 G/DL (ref 6.3–8.2)
SAO2 % BLDA: 88.4 % (ref 95–98)
SODIUM SERPL-SCNC: 141 MMOL/L (ref 132–148)
TOTAL CELLS COUNTED BLD: 100
WBC # BLD AUTO: 7 K/UL (ref 4.8–10.8)

## 2017-09-19 RX ADMIN — IPRATROPIUM BROMIDE AND ALBUTEROL SULFATE SCH ML: .5; 3 SOLUTION RESPIRATORY (INHALATION) at 11:14

## 2017-09-19 RX ADMIN — AMMONIUM LACTATE SCH APPLIC: 12 CREAM TOPICAL at 08:59

## 2017-09-19 RX ADMIN — ENOXAPARIN SODIUM SCH MG: 40 INJECTION SUBCUTANEOUS at 08:58

## 2017-09-19 RX ADMIN — AMMONIUM LACTATE SCH APPLIC: 12 CREAM TOPICAL at 16:51

## 2017-09-19 RX ADMIN — IPRATROPIUM BROMIDE AND ALBUTEROL SULFATE SCH ML: .5; 3 SOLUTION RESPIRATORY (INHALATION) at 19:13

## 2017-09-19 RX ADMIN — CIPROFLOXACIN SCH MLS/HR: 2 INJECTION, SOLUTION INTRAVENOUS at 21:12

## 2017-09-19 RX ADMIN — IPRATROPIUM BROMIDE AND ALBUTEROL SULFATE SCH: .5; 3 SOLUTION RESPIRATORY (INHALATION) at 15:20

## 2017-09-19 RX ADMIN — IPRATROPIUM BROMIDE AND ALBUTEROL SULFATE SCH ML: .5; 3 SOLUTION RESPIRATORY (INHALATION) at 07:25

## 2017-09-19 RX ADMIN — CIPROFLOXACIN SCH MLS/HR: 2 INJECTION, SOLUTION INTRAVENOUS at 09:01

## 2017-09-19 NOTE — CP.PCM.PN
<Francisco Burnett - Last Filed: 09/19/17 16:05>





Subjective





- Date & Time of Evaluation


Date of Evaluation: 09/19/17


Time of Evaluation: 06:50





- Subjective


Subjective: 





General surgery





Pt S&E at bedside this AM. No acute events overnight. Tolerating diet with no 

abdominal pain. Pt on high flow oxygen. O2 saturation at baseline. Denies N/V/D 





Objective





- Vital Signs/Intake and Output


Vital Signs (last 24 hours): 


 











Temp Pulse Resp BP Pulse Ox


 


 97.5 F L  88   22   160/74 H  88 L


 


 09/19/17 09:18  09/19/17 09:18  09/19/17 09:18  09/19/17 09:18  09/19/17 09:18











- Medications


Medications: 


 Current Medications





Albuterol (Ventolin Hfa 90 Mcg/Actuation (8 G))  2 puff IH Q4H PRN


   PRN Reason: Shortness of Breath


Albuterol/Ipratropium (Duoneb 3 Mg/0.5 Mg (3 Ml) Ud)  3 ml INH RQID [facility]


   Last Admin: 09/19/17 07:25 Dose:  3 ml


Atorvastatin Calcium (Lipitor)  80 mg PO HS [facility]


   Last Admin: 09/18/17 21:28 Dose:  80 mg


Bisoprolol Fumarate (Zebeta)  2.5 mg PO DAILY [facility]


   Last Admin: 09/19/17 08:57 Dose:  2.5 mg


Enoxaparin Sodium (Lovenox)  40 mg SC DAILY [facility]


   PRN Reason: Protocol


   Last Admin: 09/19/17 08:58 Dose:  40 mg


Famotidine (Pepcid)  20 mg PO BID [facility]


   Last Admin: 09/19/17 08:56 Dose:  20 mg


Ciprofloxacin (Cipro 400mg/200ml Dsw)  400 mg in 200 mls @ 200 mls/hr IVPB Q12 

[facility]


   Last Admin: 09/19/17 09:01 Dose:  200 mls/hr


Isosorbide Mononitrate (Imdur Er)  30 mg PO DAILY [facility]


   Last Admin: 09/19/17 08:57 Dose:  30 mg


Ketorolac Tromethamine (Toradol)  15 mg IVP Q6 PRN


   PRN Reason: pain 5-10


Lactic Acid (Lac-Hydrin 12% Cream (140 G))  1 ea TOP BID [facility]


   Last Admin: 09/19/17 08:59 Dose:  1 applic


Lisinopril (Zestril)  2.5 mg PO DAILY [facility]


   Last Admin: 09/19/17 08:57 Dose:  2.5 mg


Ondansetron HCl (Zofran Inj)  4 mg IVP Q4 PRN


   PRN Reason: Nausea/Vomiting


   Last Admin: 09/15/17 20:42 Dose:  4 mg











- Labs


Labs: 


 





 09/19/17 05:15 





 09/19/17 05:15 





 











PT  11.9 Seconds (9.8-13.1)   09/15/17  12:30    


 


INR  1.2  (0.9-1.2)   09/15/17  12:30    


 


APTT  33.8 Seconds (25.6-37.1)   09/15/17  12:30    














- Constitutional


Appears: Non-toxic, No Acute Distress





- Head Exam


Head Exam: ATRAUMATIC





- Eye Exam


Eye Exam: EOMI





- ENT Exam


ENT Exam: Mucous Membranes Moist





- Respiratory Exam


Respiratory Exam: Decreased Breath Sounds, NORMAL BREATHING PATTERN.  absent: 

Accessory Muscle Use, Respiratory Distress





- Cardiovascular Exam


Cardiovascular Exam: absent: Bradycardia





- GI/Abdominal Exam


GI & Abdominal Exam: Soft.  absent: Distended, Firm, Guarding, Rigid, Tenderness





- Extremities Exam


Extremities Exam: Calf Tenderness





- Neurological Exam


Neurological Exam: Oriented x3





- Skin


Skin Exam: Dry, Warm





Assessment and Plan





- Assessment and Plan (Free Text)


Assessment: 





86F w/ cholelithiasis


Plan: 





- pain control


- Gallbladder visualized on HIDA indicating not acute cholecystitits


- Patient will need pulmonary medicine clearance prior to proceeding with any 

surgery


- continue care as per medial team


- Plan for cholecystectomy tomorrow (Wednesday)











<Brant Hernandez - Last Filed: 09/19/17 20:26>





Subjective





- Date & Time of Evaluation


Time of Evaluation: 20:05





- Subjective


Subjective: 





Patient was seen and examined at the bedside.  Agree with resident's note above.





Objective





- Vital Signs/Intake and Output


Vital Signs (last 24 hours): 


 











Temp Pulse Resp BP Pulse Ox


 


 97.8 F   73   20   149/64   89 L


 


 09/19/17 16:19  09/19/17 16:19  09/19/17 16:19  09/19/17 16:19  09/19/17 16:19











- Medications


Medications: 


 Current Medications





Albuterol (Ventolin Hfa 90 Mcg/Actuation (8 G))  2 puff IH Q4H PRN


   PRN Reason: Shortness of Breath


Albuterol/Ipratropium (Duoneb 3 Mg/0.5 Mg (3 Ml) Ud)  3 ml INH RQID [facility]


   Last Admin: 09/19/17 19:13 Dose:  3 ml


Atorvastatin Calcium (Lipitor)  80 mg PO HS [facility]


   Last Admin: 09/18/17 21:28 Dose:  80 mg


Bisoprolol Fumarate (Zebeta)  2.5 mg PO DAILY [facility]


   Last Admin: 09/19/17 08:57 Dose:  2.5 mg


Enoxaparin Sodium (Lovenox)  40 mg SC DAILY [facility]


   PRN Reason: Protocol


   Last Admin: 09/19/17 08:58 Dose:  40 mg


Famotidine (Pepcid)  20 mg PO BID [facility]


   Last Admin: 09/19/17 16:51 Dose:  20 mg


Ciprofloxacin (Cipro 400mg/200ml Dsw)  400 mg in 200 mls @ 200 mls/hr IVPB Q12 

[facility]


   Last Admin: 09/19/17 09:01 Dose:  200 mls/hr


Isosorbide Mononitrate (Imdur Er)  30 mg PO DAILY [facility]


   Last Admin: 09/19/17 08:57 Dose:  30 mg


Ketorolac Tromethamine (Toradol)  15 mg IVP Q6 PRN


   PRN Reason: pain 5-10


Lactic Acid (Lac-Hydrin 12% Cream (140 G))  1 ea TOP BID [facility]


   Last Admin: 09/19/17 16:51 Dose:  1 applic


Lisinopril (Zestril)  2.5 mg PO DAILY [facility]


   Last Admin: 09/19/17 08:57 Dose:  2.5 mg


Ondansetron HCl (Zofran Inj)  4 mg IVP Q4 PRN


   PRN Reason: Nausea/Vomiting


   Last Admin: 09/15/17 20:42 Dose:  4 mg











- Labs


Labs: 


 





 09/19/17 05:15 





 09/19/17 05:15 





 











PT  11.9 Seconds (9.8-13.1)   09/15/17  12:30    


 


INR  1.2  (0.9-1.2)   09/15/17  12:30    


 


APTT  33.8 Seconds (25.6-37.1)   09/15/17  12:30    














Assessment and Plan





- Assessment and Plan (Free Text)


Plan: 





- NPO after midnight


- Plan for cholecystectomy in am


- Will follow

## 2017-09-19 NOTE — CP.PCM.PN
Subjective





- Date & Time of Evaluation


Date of Evaluation: 09/19/17


Time of Evaluation: 10:25





- Subjective


Subjective: 





Comfortable, lying in bed.


On nasal oxygen at 3LPM with SpO2 90%.


HIDA scan does not reveal any acute cholecystitis.


Recurring abdominal pain will still require GB surgery.





Dependant edema in LE's is decreasing. No cyanosis.


No calf tenderness or palpable venous cords.


Breath sounds are diminished bilaterally w/o wheeze.


No bronchial breath sounds or egophony.


Bilateral lower lobe dry rales. No rub.


Abdomen is soft, fleshy and non-tender.





Will discuss with surgery about timing.


Patient is cleared from a pulmonary standpoint for surgery.


Will request VQ scan in the meantime.


Anemia has resolved w/o intervention.


Needs pulmonary rehab and weight loss after discharge.








Objective





- Vital Signs/Intake and Output


Vital Signs (last 24 hours): 


 











Temp Pulse Resp BP Pulse Ox


 


 97.5 F L  88   22   160/74 H  88 L


 


 09/19/17 09:18  09/19/17 09:18  09/19/17 09:18  09/19/17 09:18  09/19/17 09:18











- Medications


Medications: 


 Current Medications





Albuterol (Ventolin Hfa 90 Mcg/Actuation (8 G))  2 puff IH Q4H PRN


   PRN Reason: Shortness of Breath


Albuterol/Ipratropium (Duoneb 3 Mg/0.5 Mg (3 Ml) Ud)  3 ml INH RQID [facility]


   Last Admin: 09/19/17 07:25 Dose:  3 ml


Atorvastatin Calcium (Lipitor)  80 mg PO HS [facility]


   Last Admin: 09/18/17 21:28 Dose:  80 mg


Bisoprolol Fumarate (Zebeta)  2.5 mg PO DAILY [facility]


   Last Admin: 09/19/17 08:57 Dose:  2.5 mg


Enoxaparin Sodium (Lovenox)  40 mg SC DAILY [facility]


   PRN Reason: Protocol


   Last Admin: 09/19/17 08:58 Dose:  40 mg


Famotidine (Pepcid)  20 mg PO BID [facility]


   Last Admin: 09/19/17 08:56 Dose:  20 mg


Ciprofloxacin (Cipro 400mg/200ml Dsw)  400 mg in 200 mls @ 200 mls/hr IVPB Q12 

[facility]


   Last Admin: 09/19/17 09:01 Dose:  200 mls/hr


Isosorbide Mononitrate (Imdur Er)  30 mg PO DAILY [facility]


   Last Admin: 09/19/17 08:57 Dose:  30 mg


Ketorolac Tromethamine (Toradol)  15 mg IVP Q6 PRN


   PRN Reason: pain 5-10


Lactic Acid (Lac-Hydrin 12% Cream (140 G))  1 ea TOP BID [facility]


   Last Admin: 09/19/17 08:59 Dose:  1 applic


Lisinopril (Zestril)  2.5 mg PO DAILY [facility]


   Last Admin: 09/19/17 08:57 Dose:  2.5 mg


Ondansetron HCl (Zofran Inj)  4 mg IVP Q4 PRN


   PRN Reason: Nausea/Vomiting


   Last Admin: 09/15/17 20:42 Dose:  4 mg











- Labs


Labs: 


 





 09/19/17 05:15 





 09/19/17 05:15 





 











PT  11.9 Seconds (9.8-13.1)   09/15/17  12:30    


 


INR  1.2  (0.9-1.2)   09/15/17  12:30    


 


APTT  33.8 Seconds (25.6-37.1)   09/15/17  12:30    














Assessment and Plan


(1) COPD (chronic obstructive pulmonary disease)


Status: Chronic   





(2) Pulmonary fibrosis, unspecified


Status: Chronic   





(3) Hypoxemia requiring supplemental oxygen


Status: Chronic   





(4) Abdominal pain


Status: Resolved   





(5) Cholelithiases


Status: Chronic   





(6) CAD (coronary artery disease)


Status: Chronic   





(7) Kyphosis (acquired) (postural)


Status: Chronic   





(8) Exogenous obesity


Status: Chronic   





(9) Anemia


Status: Resolved

## 2017-09-19 NOTE — NM
COMPARISON:

09/13/2017



TECHNIQUE:

45.0 mCi technetium 99-m  DTPA aerosol. 



5.8 mCI technetium 99-m MAA administered intravenously.



FINDINGS:



VENTILATION COMPONENT:

Heterogeneous ventilation particularly lower lung fields consistent 

with findings on recent chest x-ray



PERFUSION COMPONENT:

Heterogeneous distribution of radionuclide. No geographic, segmental, 

lobar abnormalities apparent on the present examination.



IMPRESSION:

Low probability ventilation perfusion scan for pulmonary embolism.

## 2017-09-20 VITALS
SYSTOLIC BLOOD PRESSURE: 169 MMHG | DIASTOLIC BLOOD PRESSURE: 77 MMHG | OXYGEN SATURATION: 93 % | RESPIRATION RATE: 18 BRPM | TEMPERATURE: 97.7 F

## 2017-09-20 VITALS — HEART RATE: 93 BPM

## 2017-09-20 LAB
ALBUMIN/GLOB SERPL: 1.3 {RATIO} (ref 1–2.1)
ALP SERPL-CCNC: 109 U/L (ref 38–126)
ALT SERPL-CCNC: 30 U/L (ref 9–52)
APTT BLD: 32.6 SECONDS (ref 25.6–37.1)
AST SERPL-CCNC: 20 U/L (ref 14–36)
BASOPHILS # BLD AUTO: 0 K/UL (ref 0–0.2)
BASOPHILS NFR BLD: 0.3 % (ref 0–2)
BILIRUB SERPL-MCNC: 0.7 MG/DL (ref 0.2–1.3)
BUN SERPL-MCNC: 7 MG/DL (ref 7–17)
CALCIUM SERPL-MCNC: 9.2 MG/DL (ref 8.4–10.2)
CHLORIDE SERPL-SCNC: 97 MMOL/L (ref 98–107)
CO2 SERPL-SCNC: 33 MMOL/L (ref 22–30)
EOSINOPHIL # BLD AUTO: 0.3 K/UL (ref 0–0.7)
EOSINOPHIL NFR BLD: 3.1 % (ref 0–4)
ERYTHROCYTE [DISTWIDTH] IN BLOOD BY AUTOMATED COUNT: 15.1 % (ref 11.5–14.5)
GLOBULIN SER-MCNC: 2.9 GM/DL (ref 2.2–3.9)
GLUCOSE SERPL-MCNC: 110 MG/DL (ref 65–105)
HCT VFR BLD CALC: 39.4 % (ref 34–47)
LYMPHOCYTES # BLD AUTO: 0.6 K/UL (ref 1–4.3)
LYMPHOCYTES NFR BLD AUTO: 7.8 % (ref 20–40)
MCH RBC QN AUTO: 28 PG (ref 27–31)
MCHC RBC AUTO-ENTMCNC: 32.1 G/DL (ref 33–37)
MCV RBC AUTO: 87.2 FL (ref 81–99)
MONOCYTES # BLD: 0.6 K/UL (ref 0–0.8)
MONOCYTES NFR BLD: 8.1 % (ref 0–10)
NEUTROPHILS # BLD: 6.5 K/UL (ref 1.8–7)
NEUTROPHILS NFR BLD AUTO: 80.7 % (ref 50–75)
NRBC BLD AUTO-RTO: 0.1 % (ref 0–0)
PLATELET # BLD: 199 K/UL (ref 130–400)
PMV BLD AUTO: 7.8 FL (ref 7.2–11.7)
POTASSIUM SERPL-SCNC: 4.2 MMOL/L (ref 3.6–5)
PROT SERPL-MCNC: 6.7 G/DL (ref 6.3–8.2)
SODIUM SERPL-SCNC: 140 MMOL/L (ref 132–148)
WBC # BLD AUTO: 8 K/UL (ref 4.8–10.8)

## 2017-09-20 RX ADMIN — IPRATROPIUM BROMIDE AND ALBUTEROL SULFATE SCH: .5; 3 SOLUTION RESPIRATORY (INHALATION) at 11:15

## 2017-09-20 RX ADMIN — IPRATROPIUM BROMIDE AND ALBUTEROL SULFATE SCH ML: .5; 3 SOLUTION RESPIRATORY (INHALATION) at 07:46

## 2017-09-20 RX ADMIN — AMMONIUM LACTATE SCH APPLIC: 12 CREAM TOPICAL at 08:19

## 2017-09-20 RX ADMIN — CIPROFLOXACIN SCH MLS/HR: 2 INJECTION, SOLUTION INTRAVENOUS at 08:16

## 2017-09-20 NOTE — CP.PCM.PN
Subjective





- Date & Time of Evaluation


Date of Evaluation: 09/20/17


Time of Evaluation: 09:02





- Subjective


Subjective: 





Discussion this morning with anesthesia.


Patient presently is asymptomatic, without pain.


No nausea or vomiting, no acute cholecystitis based on HIDA scan.


Labs are okay, vital signs are stable, she has remained afebrile.


Severe pulmonary hypertension would raise the risk for anesthesia to an 

unacceptable level for elective surgery.


Patient was given a complete explanation of the situation, and she agrees.


She will be discharged today and will have a workup as an outpatient which 

would include right heart cath and direct pressure measurement.


All her usual medication will be continued, and she will use her home oxygen 

for >12 hrs daily.





Objective





- Vital Signs/Intake and Output


Vital Signs (last 24 hours): 


 











Temp Pulse Resp BP Pulse Ox


 


 97.7 F   93 H  18   169/77 H  93 L


 


 09/20/17 07:48  09/20/17 08:11  09/20/17 07:48  09/20/17 08:11  09/20/17 07:48








Intake and Output: 


 











 09/19/17 09/20/17





 23:59 11:59


 


Intake Total  300


 


Balance  300














- Medications


Medications: 


 Current Medications





Albuterol (Ventolin Hfa 90 Mcg/Actuation (8 G))  2 puff IH Q4H PRN


   PRN Reason: Shortness of Breath


Albuterol/Ipratropium (Duoneb 3 Mg/0.5 Mg (3 Ml) Ud)  3 ml INH RQID [facility]


   Last Admin: 09/20/17 07:46 Dose:  3 ml


Atorvastatin Calcium (Lipitor)  80 mg PO HS [facility]


   Last Admin: 09/19/17 21:15 Dose:  80 mg


Bisoprolol Fumarate (Zebeta)  2.5 mg PO DAILY [facility]


   Last Admin: 09/20/17 08:11 Dose:  2.5 mg


Famotidine (Pepcid)  20 mg PO BID [facility]


   Last Admin: 09/20/17 08:47 Dose:  20 mg


Ciprofloxacin (Cipro 400mg/200ml Dsw)  400 mg in 200 mls @ 200 mls/hr IVPB Q12 

[facility]


   Last Admin: 09/20/17 08:16 Dose:  200 mls/hr


Isosorbide Mononitrate (Imdur Er)  30 mg PO DAILY [facility]


   Last Admin: 09/20/17 08:47 Dose:  30 mg


Ketorolac Tromethamine (Toradol)  15 mg IVP Q6 PRN


   PRN Reason: pain 5-10


Lactic Acid (Lac-Hydrin 12% Cream (140 G))  1 ea TOP BID [facility]


   Last Admin: 09/20/17 08:19 Dose:  1 applic


Lisinopril (Zestril)  2.5 mg PO DAILY [facility]


   Last Admin: 09/20/17 08:11 Dose:  2.5 mg


Ondansetron HCl (Zofran Inj)  4 mg IVP Q4 PRN


   PRN Reason: Nausea/Vomiting


   Last Admin: 09/15/17 20:42 Dose:  4 mg











- Labs


Labs: 


 





 09/20/17 05:33 





 09/20/17 05:33 





 











PT  12.9 Seconds (9.8-13.1)   09/20/17  05:33    


 


INR  1.3  (0.9-1.2)  H  09/20/17  05:33    


 


APTT  32.6 Seconds (25.6-37.1)   09/20/17  05:33    














Assessment and Plan


(1) COPD (chronic obstructive pulmonary disease)


Status: Chronic   





(2) Pulmonary fibrosis, unspecified


Status: Chronic   





(3) Hypoxemia requiring supplemental oxygen


Status: Chronic   





(4) Abdominal pain


Status: Resolved   





(5) Cholelithiases


Status: Chronic   





(6) CAD (coronary artery disease)


Status: Chronic   





(7) Kyphosis (acquired) (postural)


Status: Chronic   





(8) Exogenous obesity


Status: Chronic   





(9) Anemia


Status: Resolved

## 2017-09-20 NOTE — CP.PCM.DIS
Provider





- Provider


Date of Admission: 


09/15/17 14:39





Attending physician: 


Ant Booker MD





Primary care physician: 


Dr. Pyle


Consults: 





pulmonary consult 


cardiology consult


surgery consult 


Anesthesiology 








Time Spent in preparation of Discharge (in minutes): 20





Hospital Course





- Lab Results


Lab Results: 


 Micro Results





09/15/17 12:30   Blood   Blood Culture - Preliminary


                            NO GROWTH AFTER 4 DAYS


09/15/17 12:45   Blood   Blood Culture - Preliminary


                            NO GROWTH AFTER 4 DAYS





 Most Recent Lab Values











WBC  8.0 K/uL (4.8-10.8)   09/20/17  05:33    


 


RBC  4.52 Mil/uL (3.80-5.20)   09/20/17  05:33    


 


Hgb  12.7 g/dL (12.0-16.0)   09/20/17  05:33    


 


Hct  39.4 % (34.0-47.0)   09/20/17  05:33    


 


MCV  87.2 fl (81.0-99.0)   09/20/17  05:33    


 


MCH  28.0 pg (27.0-31.0)   09/20/17  05:33    


 


MCHC  32.1 g/dL (33.0-37.0)  L  09/20/17  05:33    


 


RDW  15.1 % (11.5-14.5)  H  09/20/17  05:33    


 


Plt Count  199 K/uL (130-400)   09/20/17  05:33    


 


MPV  7.8 fl (7.2-11.7)   09/20/17  05:33    


 


Neut % (Auto)  80.7 % (50.0-75.0)  H  09/20/17  05:33    


 


Lymph % (Auto)  7.8 % (20.0-40.0)  L  09/20/17  05:33    


 


Mono % (Auto)  8.1 % (0.0-10.0)   09/20/17  05:33    


 


Eos % (Auto)  3.1 % (0.0-4.0)   09/20/17  05:33    


 


Baso % (Auto)  0.3 % (0.0-2.0)   09/20/17  05:33    


 


Neut #  6.5 K/uL (1.8-7.0)   09/20/17  05:33    


 


Lymph #  0.6 K/uL (1.0-4.3)  L  09/20/17  05:33    


 


Mono #  0.6 K/uL (0.0-0.8)   09/20/17  05:33    


 


Eos #  0.3 K/uL (0.0-0.7)   09/20/17  05:33    


 


Baso #  0.0 K/uL (0.0-0.2)   09/20/17  05:33    


 


Neutrophils % (Manual)  75 % (42-75)   09/19/17  05:15    


 


Band Neutrophils %  1 % (0-2)   09/15/17  12:30    


 


Lymphocytes % (Manual)  12 % (20-50)  L  09/19/17  05:15    


 


Monocytes % (Manual)  9 % (0-10)   09/19/17  05:15    


 


Eosinophils % (Manual)  3 % (0-7)   09/19/17  05:15    


 


Basophils % (Manual)  1 % (0-2)   09/19/17  05:15    


 


Platelet Estimate  Normal  (NORMAL)   09/19/17  05:15    


 


Large Platelets  Present   09/19/17  05:15    


 


Poikilocytosis (manual  Slight   09/15/17  12:30    


 


Anisocytosis (manual)  Slight   09/19/17  05:15    


 


Ovalocytes  Slight   09/19/17  05:15    


 


PT  12.9 Seconds (9.8-13.1)   09/20/17  05:33    


 


INR  1.3  (0.9-1.2)  H  09/20/17  05:33    


 


APTT  32.6 Seconds (25.6-37.1)   09/20/17  05:33    


 


pCO2  60 mm/Hg (35-45)  H  09/19/17  06:25    


 


pO2  53 mm/Hg ()  L  09/19/17  06:25    


 


HCO3  34.9 mmol/L (21-28)  H  09/19/17  06:25    


 


ABG pH  7.43  (7.35-7.45)   09/19/17  06:25    


 


ABG Total CO2  41.6 mmol/L (22-28)  H  09/19/17  06:25    


 


ABG O2 Saturation  92.1 % (95-98)  L  09/19/17  06:25    


 


ABG O2 Content  15.2 ML/dL (15-23)   09/19/17  06:25    


 


ABG Base Excess  13.1 mmol/L (-2.0-3.0)  H  09/19/17  06:25    


 


ABG Hemoglobin  12.2 g/dL (11.7-17.4)   09/19/17  06:25    


 


ABG Carboxyhemoglobin  2.3 % (0.5-1.5)  H  09/19/17  06:25    


 


POC ABG HHb (Measured)  7.6 % (0.0-5.0)  H  09/19/17  06:25    


 


ABG Methemoglobin  1.8 % (0.0-3.0)   09/19/17  06:25    


 


ABG O2 Capacity  16.5 mL/dL (16-24)   09/19/17  06:25    


 


Chito Test  Yes   09/19/17  06:25    


 


VBG pH  7.57  (7.32-7.43)  H  09/15/17  12:50    


 


VBG pCO2  35 mmHg (40-60)  L  09/15/17  12:50    


 


VBG HCO3  31.7 mmol/L  09/15/17  12:50    


 


VBG Total CO2  33.2 mmol/L (22-28)  H  09/15/17  12:50    


 


VBG O2 Sat (Calc)  77.6 % (40-65)  H  09/15/17  12:50    


 


VBG Base Excess  9.6 mmol/L (0.0-2.0)  H  09/15/17  12:50    


 


VBG Potassium  5.5 mmol/L (3.6-5.2)  H  09/15/17  12:50    


 


A-a O2 Difference  72.0 mm/Hg  09/19/17  06:25    


 


Hgb O2 Saturation  88.4 % (95.0-98.0)  L  09/19/17  06:25    


 


Chloride  129.0 mmol/L ()  H  09/15/17  12:50    


 


Glucose  65 mg/dL ()   09/15/17  12:50    


 


Lactate  1.4 mmol/L (0.7-2.1)   09/15/17  12:50    


 


Liter Flow  35   09/18/17  06:20    


 


Vent Mode  High flow lpm   09/18/17  06:20    


 


FiO2  28.0 %  09/19/17  06:25    


 


Blood Gas Comments  2l/m nc.rr   09/17/17  08:19    


 


Crit Value Called To  Chaya winston   09/17/17  08:19    


 


Crit Value Called By  15   09/17/17  08:19    


 


Crit Value Read Back  Y   09/17/17  08:19    


 


Blood Gas Notified Time  1018   09/17/17  08:19    


 


Sodium  140 mmol/l (132-148)   09/20/17  05:33    


 


Potassium  4.2 MMOL/L (3.6-5.0)   09/20/17  05:33    


 


Chloride  97 mmol/L ()  L  09/20/17  05:33    


 


Carbon Dioxide  33 mmol/L (22-30)  H  09/20/17  05:33    


 


Anion Gap  14  (10-20)   09/20/17  05:33    


 


BUN  7 mg/dl (7-17)   09/20/17  05:33    


 


Creatinine  0.6 mg/dL (0.7-1.2)  L  09/20/17  05:33    


 


Est GFR ( Amer)  > 60   09/20/17  05:33    


 


Est GFR (Non-Af Amer)  > 60   09/20/17  05:33    


 


Random Glucose  110 mg/dL ()  H  09/20/17  05:33    


 


Calcium  9.2 mg/dL (8.4-10.2)   09/20/17  05:33    


 


Total Bilirubin  0.7 mg/dl (0.2-1.3)   09/20/17  05:33    


 


AST  20 U/L (14-36)   09/20/17  05:33    


 


ALT  30 U/L (9-52)   09/20/17  05:33    


 


Alkaline Phosphatase  109 U/L ()   09/20/17  05:33    


 


Troponin I  < 0.0120 ng/mL (0.00-0.120)   09/15/17  12:30    


 


NT-Pro-B Natriuret Pep  504 pg/ml (0-900)   09/15/17  12:30    


 


Total Protein  6.7 G/DL (6.3-8.2)   09/20/17  05:33    


 


Albumin  3.8 g/dL (3.5-5.0)   09/20/17  05:33    


 


Globulin  2.9 gm/dL (2.2-3.9)   09/20/17  05:33    


 


Albumin/Globulin Ratio  1.3  (1.0-2.1)   09/20/17  05:33    


 


Lipase  29 U/L ()   09/15/17  12:30    


 


Venous Blood Potassium  5.5 mmol/L (3.6-5.2)  H  09/15/17  12:50    














- Hospital Course


Hospital Course: 


85 yo female with history of CAD (2 stents 4 yrs ago), AFib (paroxysmal), COPD (

oxygen dependent) and HTN was seen in the ER because of on and off RUQ pain 

associated with nausea and vomiting for 7 days. Patient was found to be in COPD 

exacerbation in addition to cholelithiasis. Abd US showed cholelithiasis and 

CBD 5mm.She was started on cipro Iv for possible cholecystitis. Surgery was 

consulted for possible cholecystectomy and recommended HIDA scan to rule out 

cholecystitis.Pulmonary was consulted for respiratory optimization since 

patient has COPD , is O2 dependent and had mild exacerbation of COPD.


Cardiology was consulted for preop clearance. Echo showed normal EF but very 

elevated right ventricular systolic pressure 74.V/Q scan showed no PE 


HIDA scan was reported as negative for cholecystitis so plan is for patient to 

have cholecystectomy . Given her pulmonary status, elevated PA pressures 

anesthesiology expressed reservation about general anesthesia. Patient 

currently is asymptomatic, pain resolved , tolerating PO intake. Will discharge 

patient home. Will need to have right side cardiac cath to better evaluate 

pressures on the right side of the heart before planning any surgery


Patient to follow up with Dr. Pyle pulmonary and Dr. Tomlinson as outpatient 


Continue all other medications the same 











  


1. Abdominal pain 2/2 Cholelithiasis


cholecystitis ruled out 


pain resolved





2. COPD exacerbation with hypoxia on Home O2


continue O2 inhalation at bedtime


PO2 53 


Keep O2 via NC > 12 hours / day    





3. Pulmonary Hypertension


ECHO: severe pulmonary HTN,  EF 50-55%


VQ negative for PE 


PA pressures 74 





4. CAD (coronary artery disease)


stable


Continue home meds cardiologyu consulted 


ASA and Plavix on hold 


Lipitor 80mg PO HS.  


Zebeta 2.5mg PO daily.  


Isosorbide Mononitrate 30mg PO daily   





5. Hypertension


BP controlled. 


continue Lisinopril 2.5mg PO daily.  


Zebeta 2.5mg PO daily.  


Isosorbide Mononitrate 30mg PO daily   





6. Afib


in sinus and well controlled rate.


continue Zebeta





7. DVT prophylaxis


venodyne boots while on bed  











Discharge Exam





- Head Exam


Head Exam: ATRAUMATIC, NORMOCEPHALIC





- Eye Exam


Eye Exam: EOMI, PERRL


Pupil Exam: NORMAL ACCOMODATION





- ENT Exam


ENT Exam: Mucous Membranes Moist, Normal Exam





- Neck Exam


Neck exam: Full Rom, Normal Inspection





- Respiratory Exam


Respiratory Exam: Clear to PA & Lateral.  absent: Rhonchi, Wheezes





- Cardiovascular Exam


Cardiovascular Exam: REGULAR RHYTHM, +S1, +S2.  absent: JVD





- GI/Abdominal Exam


GI & Abdominal Exam: Normal Bowel Sounds, Soft.  absent: Distended, Guarding, 

Rebound, Tenderness





- Rectal Exam


Rectal Exam: Deferred





- Extremities Exam


Extremities exam: normal capillary refill, normal inspection, pedal pulses 

present





- Back Exam


Back exam: NORMAL INSPECTION





- Neurological Exam


Neurological exam: Alert, CN II-XII Intact, Oriented x3, Reflexes Normal





- Psychiatric Exam


Psychiatric exam: Normal Affect, Normal Mood





- Skin


Skin Exam: Dry, Intact, Normal Color, Warm





Discharge Plan





- Follow Up Plan


Condition: STABLE


Disposition: HOME/ ROUTINE


Patient education suggested?: Yes


Instructions:  Gallstones (DC), Low Fat Diet (DC), Low Fiber Diet (GEN)


Additional Instructions: 


follow up with dr pyle in office


Also follow up with cardiologist oupatient


Referrals: 


James Tomlinson MD [Staff Provider] - 


Ismael Langford MD [Staff Provider] - 


Molina Pyle MD [Family Provider] -

## 2017-09-27 ENCOUNTER — HOSPITAL ENCOUNTER (OUTPATIENT)
Dept: HOSPITAL 42 - CATH | Age: 82
Discharge: HOME | End: 2017-09-27
Attending: INTERNAL MEDICINE
Payer: MEDICARE

## 2017-09-27 VITALS — TEMPERATURE: 97.9 F | OXYGEN SATURATION: 92 % | RESPIRATION RATE: 20 BRPM

## 2017-09-27 VITALS — HEART RATE: 78 BPM

## 2017-09-27 VITALS — BODY MASS INDEX: 38.4 KG/M2

## 2017-09-27 VITALS — SYSTOLIC BLOOD PRESSURE: 154 MMHG | DIASTOLIC BLOOD PRESSURE: 76 MMHG

## 2017-09-27 DIAGNOSIS — R05: ICD-10-CM

## 2017-09-27 DIAGNOSIS — I10: ICD-10-CM

## 2017-09-27 DIAGNOSIS — J44.9: ICD-10-CM

## 2017-09-27 DIAGNOSIS — R06.00: ICD-10-CM

## 2017-09-27 DIAGNOSIS — E66.9: ICD-10-CM

## 2017-09-27 DIAGNOSIS — Z03.89: Primary | ICD-10-CM

## 2017-09-27 DIAGNOSIS — I38: ICD-10-CM

## 2017-09-27 DIAGNOSIS — F17.200: ICD-10-CM

## 2017-09-27 LAB
APTT BLD: 28.9 SECONDS (ref 23.7–30.8)
BASOPHILS # BLD AUTO: 0.01 K/MM3 (ref 0–2)
BASOPHILS NFR BLD: 0.1 % (ref 0–3)
BUN SERPL-MCNC: 14 MG/DL (ref 7–21)
CALCIUM SERPL-MCNC: 8.9 MG/DL (ref 8.4–10.5)
CHLORIDE SERPL-SCNC: 102 MMOL/L (ref 98–107)
CHOLEST SERPL-MCNC: 106 MG/DL (ref 130–200)
CO2 SERPL-SCNC: 33 MMOL/L (ref 21–33)
EOSINOPHIL # BLD: 0.2 10*3/UL (ref 0–0.7)
EOSINOPHIL NFR BLD: 3 % (ref 1.5–5)
ERYTHROCYTE [DISTWIDTH] IN BLOOD BY AUTOMATED COUNT: 14.4 % (ref 11.5–14.5)
GLUCOSE SERPL-MCNC: 118 MG/DL (ref 70–110)
GRANULOCYTES # BLD: 5.64 10*3/UL (ref 1.4–6.5)
GRANULOCYTES NFR BLD: 79.8 % (ref 50–68)
HCT VFR BLD CALC: 39.1 % (ref 36–48)
INR PPP: 1.05 (ref 0.93–1.08)
LYMPHOCYTES # BLD: 0.6 10*3/UL (ref 1.2–3.4)
LYMPHOCYTES NFR BLD AUTO: 8.8 % (ref 22–35)
MCH RBC QN AUTO: 27.4 PG (ref 25–35)
MCHC RBC AUTO-ENTMCNC: 30.7 G/DL (ref 31–37)
MCV RBC AUTO: 89.3 FL (ref 80–105)
MONOCYTES # BLD AUTO: 0.6 10*3/UL (ref 0.1–0.6)
MONOCYTES NFR BLD: 8.3 % (ref 1–6)
PLATELET # BLD: 235 10^3/UL (ref 120–450)
PMV BLD AUTO: 9.3 FL (ref 7–11)
POTASSIUM SERPL-SCNC: 4.2 MMOL/L (ref 3.6–5)
SODIUM SERPL-SCNC: 142 MMOL/L (ref 132–148)
WBC # BLD AUTO: 7.1 10^3/UL (ref 4.5–11)

## 2017-09-27 PROCEDURE — 36415 COLL VENOUS BLD VENIPUNCTURE: CPT

## 2017-09-27 PROCEDURE — 80061 LIPID PANEL: CPT

## 2017-09-27 PROCEDURE — 80048 BASIC METABOLIC PNL TOTAL CA: CPT

## 2017-09-27 PROCEDURE — 85610 PROTHROMBIN TIME: CPT

## 2017-09-27 PROCEDURE — 85730 THROMBOPLASTIN TIME PARTIAL: CPT

## 2017-09-27 PROCEDURE — 93451 RIGHT HEART CATH: CPT

## 2017-09-27 PROCEDURE — 86900 BLOOD TYPING SEROLOGIC ABO: CPT

## 2017-09-27 PROCEDURE — 99152 MOD SED SAME PHYS/QHP 5/>YRS: CPT

## 2017-09-27 PROCEDURE — 85025 COMPLETE CBC W/AUTO DIFF WBC: CPT

## 2017-09-27 PROCEDURE — 93005 ELECTROCARDIOGRAM TRACING: CPT

## 2017-09-27 PROCEDURE — 86850 RBC ANTIBODY SCREEN: CPT

## 2017-09-27 RX ADMIN — ADENOSINE ONE MG: 3 SOLUTION INTRAVENOUS at 16:45

## 2017-09-27 RX ADMIN — ADENOSINE ONE: 3 SOLUTION INTRAVENOUS at 16:37

## 2017-09-27 RX ADMIN — ADENOSINE ONE: 3 SOLUTION INTRAVENOUS at 16:22

## 2017-09-27 NOTE — PROCN
DATE OF STUDY:  09/27/2017



INDICATIONS:  Ms. Shavon Ness is an 86-year-old female with past medical

history significant for hypertension, dyslipidemia, and COPD, who was

referred to me for right heart catheterization for evaluation of severe

pulmonary hypertension that was diagnosed on transthoracic echocardiogram.



PROCEDURE PERFORMED:  Right heart catheterization with hemodynamics and

cardiac output.



TECHNIQUES OF PROCEDURE:  After obtaining informed consent, the patient was

brought to the cardiac cath suite in post-absorptive non-sedated state. 

The patient was prepped and draped in the usual sterile fashion.  A 2%

lidocaine was used for infiltration of anesthesia.  Using modified

Seldinger technique, a 7-Amharic sheath was introduced into the right

femoral vein.  Subsequently, under fluoroscopic guidance, pulmonary

capillary wedge catheter was advanced with balloon inflated serially

through the IVC into the RA, right ventricle, pulmonary artery, and wedge

position.  Hemodynamics and saturations were obtained.  Subsequently,

cardiac output and index were calculated using the thermodilution method.



HEMODYNAMICS OF RIGHT HEART CATHETERIZATION:

1.  Right atrial pressures 10/8/6 -- mean right atrial pressure is 6 mmHg.

2.  Right ventricular pressure 42/2/7, right ventricular end-diastolic

pressure was 7 mmHg with systolic pressure of 42.

3.  Pulmonary artery pressures, systolic 42, diastolic 16 with a mean of 25

mmHg.

4.  Pulmonary capillary wedge pressures, mean pulmonary capillary wedge

pressure was 8 mmHg.



Cardiac output using the thermodilution method was calculated to be 5.6

L/min with cardiac index of 2.8 L/min/m2.



IMPRESSION:

1.  Normal filling pressures.

2.  Normal pulmonary pressures.

3.  Normal cardiac output.



RECOMMENDATIONS:  The patient is to be followed by Dr. Guerrero for her

underlying COPD.  No evidence of pulmonary arterial hypertension.



Thank you Dr. Guerrero for letting me participate in the care of your patient.







__________________________________________

Haroon Ritchie MD



cc:  Luke Guerrero MD, [facility].



DD:  09/27/2017 18:37:08

DT:  09/27/2017 19:26:16

Job # [id number]

## 2017-09-27 NOTE — CARD
--------------- APPROVED REPORT --------------





EKG Measurement

Heart [id number]

CA 140P45

[id number]

[id number]

[id number]



<Conclusion>

Sinus rhythm with premature atrial complexes

Junctional ST depression, probably normal

Borderline ECG

## 2018-07-25 ENCOUNTER — HOSPITAL ENCOUNTER (EMERGENCY)
Dept: HOSPITAL 14 - H.ER | Age: 83
Discharge: HOME | End: 2018-07-25
Payer: MEDICARE

## 2018-07-25 VITALS
SYSTOLIC BLOOD PRESSURE: 153 MMHG | TEMPERATURE: 98 F | OXYGEN SATURATION: 93 % | HEART RATE: 87 BPM | RESPIRATION RATE: 16 BRPM | DIASTOLIC BLOOD PRESSURE: 93 MMHG

## 2018-07-25 VITALS — BODY MASS INDEX: 38.4 KG/M2

## 2018-07-25 DIAGNOSIS — N39.0: Primary | ICD-10-CM

## 2018-07-25 DIAGNOSIS — N95.0: ICD-10-CM

## 2018-07-25 DIAGNOSIS — Z88.0: ICD-10-CM

## 2018-07-25 DIAGNOSIS — Z95.5: ICD-10-CM

## 2018-07-25 LAB
ALBUMIN SERPL-MCNC: 3.7 G/DL (ref 3.5–5)
ALBUMIN/GLOB SERPL: 1.3 {RATIO} (ref 1–2.1)
ALT SERPL-CCNC: 37 U/L (ref 9–52)
APTT BLD: 27.7 SECONDS (ref 25.6–37.1)
AST SERPL-CCNC: 33 U/L (ref 14–36)
BACTERIA #/AREA URNS HPF: (no result) /[HPF]
BASOPHILS # BLD AUTO: 0.1 K/UL (ref 0–0.2)
BASOPHILS NFR BLD: 0.5 % (ref 0–2)
BILIRUB UR-MCNC: NEGATIVE MG/DL
BUN SERPL-MCNC: 30 MG/DL (ref 7–17)
CALCIUM SERPL-MCNC: 9.3 MG/DL (ref 8.4–10.2)
COLOR UR: YELLOW
EOSINOPHIL # BLD AUTO: 0.2 K/UL (ref 0–0.7)
EOSINOPHIL NFR BLD: 1.6 % (ref 0–4)
ERYTHROCYTE [DISTWIDTH] IN BLOOD BY AUTOMATED COUNT: 15.2 % (ref 11.5–14.5)
GFR NON-AFRICAN AMERICAN: > 60
GLUCOSE UR STRIP-MCNC: (no result) MG/DL
HGB BLD-MCNC: 13.1 G/DL (ref 12–16)
INR PPP: 1 (ref 0.9–1.2)
LEUKOCYTE ESTERASE UR-ACNC: (no result) LEU/UL
LYMPHOCYTES # BLD AUTO: 1.4 K/UL (ref 1–4.3)
LYMPHOCYTES NFR BLD AUTO: 13.4 % (ref 20–40)
MCH RBC QN AUTO: 28.7 PG (ref 27–31)
MCHC RBC AUTO-ENTMCNC: 32.6 G/DL (ref 33–37)
MCV RBC AUTO: 88.1 FL (ref 81–99)
MONOCYTES # BLD: 0.8 K/UL (ref 0–0.8)
MONOCYTES NFR BLD: 7.4 % (ref 0–10)
NEUTROPHILS # BLD: 8.3 K/UL (ref 1.8–7)
NEUTROPHILS NFR BLD AUTO: 77.1 % (ref 50–75)
NRBC BLD AUTO-RTO: 0 % (ref 0–0)
PH UR STRIP: 5 [PH] (ref 5–8)
PLATELET # BLD: 226 K/UL (ref 130–400)
PMV BLD AUTO: 8.1 FL (ref 7.2–11.7)
PROT UR STRIP-MCNC: 30 MG/DL
PROTHROMBIN TIME: 11.1 SECONDS (ref 9.8–13.1)
RBC # BLD AUTO: 4.57 MIL/UL (ref 3.8–5.2)
RBC # UR STRIP: (no result) /UL
SP GR UR STRIP: 1.03 (ref 1–1.03)
SQUAMOUS EPITHIAL: 2 /HPF (ref 0–5)
URINE CLARITY: (no result)
UROBILINOGEN UR-MCNC: 2 MG/DL (ref 0.2–1)
WBC # BLD AUTO: 10.8 K/UL (ref 4.8–10.8)

## 2018-07-25 NOTE — ED PDOC
HPI: Female  Pain


Time Seen by Provider: 18 19:41


Chief Complaint (Nursing): Female Genitourinary


Chief Complaint (Provider): Vaginal Bleeding


History Per: Patient


History/Exam Limitations: no limitations


Onset/Duration Of Symptoms: Days (x2)


Current Symptoms Are (Timing): Still Present


Additional Complaint(s): 


88 y/o  female with a PMHx of COPD, HTN, CAD (stents), and home O2 

dependence presenting for evaluation of vaginal bleeding x2 days. Patient 

states she notices the bleeding mostly after urinating. She states bleeding is 

mostly spotting and denies any clots. Patient also reports lower abdominal 

pain. She denies any nausea, vomiting, diarrhea, or rectal pain. She reports 

normal stools.





PMD: Dr. Pyle








Past Medical History


Reviewed: Historical Data, Nursing Documentation, Vital Signs


Vital Signs: 





 Last Vital Signs











Temp  98.0 F   18 19:33


 


Pulse  87   18 19:33


 


Resp  16   18 19:33


 


BP  153/93 H  18 19:33


 


Pulse Ox  93 L  18 19:33














- Medical History


PMH: Atrial Fibrillation, CAD, CHF, COPD, Emphysema, Fractures (right ankle), 

HTN, Hypercholesterolemia, Peripheral Edema, Pneumonia


   Denies: Diabetes, HIV, Chronic Kidney Disease





- Surgical History


Surgical History: Coronary Stent (x 2)


   Denies: Appendectomy, Pacemaker





- Family History


Family History: States: Unknown Family Hx





- Social History


Current smoker - smoking cessation education provided: No


Alcohol: None


Drugs: Denies





- Home Medications


Home Medications: 


 Ambulatory Orders











 Medication  Instructions  Recorded


 


Aspirin [Ecotrin] 81 mg PO DAILY 16


 


Atorvastatin [Lipitor] 80 mg PO HS 16


 


Bisoprolol [Zebeta] 2.5 mg PO DAILY 16


 


Clopidogrel [Plavix] 75 mg PO DAILY 16


 


Isosorbide Mononitrate ER [Imdur 30 mg PO DAILY 16





ER]  


 


Lisinopril [Zestril] 2.5 mg PO DAILY 16


 


Umeclidinium Brm/Vilanterol Tr 1 puff IH DAILY 17





[Anoro Ellipta]  


 


Ciprofloxacin [Cipro] 500 mg PO Q12 #14 tab 18














- Allergies


Allergies/Adverse Reactions: 


 Allergies











Allergy/AdvReac Type Severity Reaction Status Date / Time


 


Penicillins Allergy Mild SWELLING Verified 10/05/16 09:43














Review of Systems


ROS Statement: Except As Marked, All Systems Reviewed And Found Negative


Gastrointestinal: Negative for: Nausea, Vomiting, Diarrhea, Rectal Pain


Genitourinary Female: Positive for: Vaginal Bleeding





Physical Exam





- Reviewed


Nursing Documentation Reviewed: Yes


Vital Signs Reviewed: Yes





- Physical Exam


Appears: Positive for: Non-toxic, No Acute Distress


Head Exam: Positive for: ATRAUMATIC, NORMAL INSPECTION, NORMOCEPHALIC


Skin: Positive for: Normal Color, Warm, Dry.  Negative for: Rash


Eye Exam: Positive for: EOMI, Normal appearance, PERRL


ENT: Positive for: Normal ENT Inspection


Neck: Positive for: Normal, Painless ROM, Supple


Cardiovascular/Chest: Positive for: Regular Rate, Rhythm.  Negative for: Murmur


Respiratory: Positive for: Normal Breath Sounds.  Negative for: Respiratory 

Distress


Gastrointestinal/Abdominal: Positive for: Normal Exam, Soft.  Negative for: 

Tenderness


Back: Positive for: Normal Inspection.  Negative for: L CVA Tenderness, R CVA 

Tenderness, Vertebral Tenderness


Extremity: Positive for: Normal ROM.  Negative for: Pedal Edema, Deformity


Neurologic/Psych: Positive for: Alert, Oriented.  Negative for: Motor/Sensory 

Deficits





- Laboratory Results


Result Diagrams: 


 18 21:15





 18 21:15





- ECG


O2 Sat by Pulse Oximetry: 93 (RA)





Medical Decision Making


Medical Decision Makin:42


Impression: 88 y/o female with post menopausal vaginal bleeding


Plan:


-ABO/Rh Type


-EKg


-CMP


-Urine dipstick


-CBC w/ differential


-PTT/PT


-Heplock insertion


-Urinalysis


-US Pelvis


-Reevaluation





21:50


Clinical Impression: UTI cystitis


Labs reviewed. Indicative of UTI based on urinalysis. Patient reports that she 

feels well and can follow up with PMD in office. Patient stable for discharge.





--------------------------------------------------------------------------------

----------------------------------


Scribe Attestation:


Documented by Gagandeep Stallworth, acting as a scribe for Dalton Iraheta MD. 





Provider Scribe Attestation:


All medical record entries made by the Scribe were at my direction and 

personally dictated by me. I have reviewed the chart and agree that the record 

accurately reflects my personal performance of the history, physical exam, 

medical decision making, and the department course for this patient. I have 

also personally directed, reviewed, and agree with the discharge instructions 

and disposition.








Disposition





- Clinical Impression


Clinical Impression: 


 UTI (urinary tract infection)








- Patient ED Disposition


Is Patient to be Admitted: No


Counseled Patient/Family Regarding: Studies Performed, Diagnosis, Need For 

Followup, Rx Given





- Disposition


Disposition: Routine/Home


Disposition Time: 21:50


Condition: STABLE


Prescriptions: 


Ciprofloxacin [Cipro] 500 mg PO Q12 #14 tab


Instructions:  Urinary Tract Infections in Adults


Forms:  [facility] (English)

## 2018-07-26 NOTE — CARD
--------------- APPROVED REPORT --------------





Date of service: 07/25/2018



EKG Measurement

Heart [id number]

WY 170P58

[id number]

[id number]

[id number]



<Conclusion>

Sinus rhythm with sinus arrhythmia with occasional premature 

ventricular complexes

Otherwise normal ECG

## 2018-07-26 NOTE — US
Date of service: 



07/25/2018



HISTORY:

Postmenopausal vaginal bleeding. 



COMPARISON:

None available.



TECHNIQUE:

Transabdominal only. Real-time technique with 2D, duplex and color 

Doppler



FINDINGS:



UTERUS:

Measures 2.9 x 4.3 x 7.4 cm. Normal in size, heterogeneous echo 

characteristics.  Location of fibroid and size: Posterior completely 

calcified 1.1 x 1.3 cm.



ENDOMETRIUM:

Measures 17.0 mm in diameter. Thickened heterogeneous endometrial 

echo complex.  Fluid identified within the endometrial canal. 



CERVIX:

No cervical abnormality identified.



RIGHT OVARY:

Not visible lungs



LEFT OVARY:

Non visible



FREE FLUID:

No significant free fluid noted.



OTHER FINDINGS:

None. 



IMPRESSION:

Thickened endometrium with fluid identified in the endometrial canal. 

Follow-up recommended. 



Limitations of the current examination: Nonvisualization of the 

adnexa.



Please note: Transvaginal ultrasound was requested however, the 

patient deferred.



________________________________________________



Concordant results (preliminary interpretation) provided by [facility].



Procedure Completed: 20:54



Preliminary ([facility]) Report: Dictated and Authenticated: 21:35



Final Interpretation: 12:09 July 26, 2018.

## 2019-02-02 ENCOUNTER — HOSPITAL ENCOUNTER (INPATIENT)
Dept: HOSPITAL 14 - H.ER | Age: 84
LOS: 4 days | Discharge: NURSING, REHAB OR LONG-TERM CARE | DRG: 308 | End: 2019-02-06
Attending: GENERAL ACUTE CARE HOSPITAL | Admitting: GENERAL ACUTE CARE HOSPITAL
Payer: MEDICARE

## 2019-02-02 VITALS — BODY MASS INDEX: 35.8 KG/M2

## 2019-02-02 DIAGNOSIS — Z77.22: ICD-10-CM

## 2019-02-02 DIAGNOSIS — I08.1: ICD-10-CM

## 2019-02-02 DIAGNOSIS — Z87.891: ICD-10-CM

## 2019-02-02 DIAGNOSIS — R32: ICD-10-CM

## 2019-02-02 DIAGNOSIS — J44.0: ICD-10-CM

## 2019-02-02 DIAGNOSIS — R09.02: ICD-10-CM

## 2019-02-02 DIAGNOSIS — Z95.5: ICD-10-CM

## 2019-02-02 DIAGNOSIS — I50.9: ICD-10-CM

## 2019-02-02 DIAGNOSIS — Z79.82: ICD-10-CM

## 2019-02-02 DIAGNOSIS — J15.9: ICD-10-CM

## 2019-02-02 DIAGNOSIS — Z99.81: ICD-10-CM

## 2019-02-02 DIAGNOSIS — E04.1: ICD-10-CM

## 2019-02-02 DIAGNOSIS — R55: ICD-10-CM

## 2019-02-02 DIAGNOSIS — Z79.01: ICD-10-CM

## 2019-02-02 DIAGNOSIS — I25.2: ICD-10-CM

## 2019-02-02 DIAGNOSIS — Z87.01: ICD-10-CM

## 2019-02-02 DIAGNOSIS — Z88.0: ICD-10-CM

## 2019-02-02 DIAGNOSIS — Z79.02: ICD-10-CM

## 2019-02-02 DIAGNOSIS — E86.0: ICD-10-CM

## 2019-02-02 DIAGNOSIS — Z91.81: ICD-10-CM

## 2019-02-02 DIAGNOSIS — I25.10: ICD-10-CM

## 2019-02-02 DIAGNOSIS — J44.1: ICD-10-CM

## 2019-02-02 DIAGNOSIS — N39.0: ICD-10-CM

## 2019-02-02 DIAGNOSIS — I48.0: Primary | ICD-10-CM

## 2019-02-02 DIAGNOSIS — I27.20: ICD-10-CM

## 2019-02-02 DIAGNOSIS — I11.0: ICD-10-CM

## 2019-02-02 DIAGNOSIS — E78.00: ICD-10-CM

## 2019-02-02 LAB
ALBUMIN SERPL-MCNC: 3.9 G/DL (ref 3.5–5)
ALBUMIN/GLOB SERPL: 1.3 {RATIO} (ref 1–2.1)
ALT SERPL-CCNC: 28 U/L (ref 9–52)
AST SERPL-CCNC: 27 U/L (ref 14–36)
BASE EXCESS BLDV CALC-SCNC: 3.5 MMOL/L (ref 0–2)
BASOPHILS # BLD AUTO: 0 K/UL (ref 0–0.2)
BASOPHILS NFR BLD: 0.4 % (ref 0–2)
BASOPHILS NFR BLD: 1 % (ref 0–2)
BUN SERPL-MCNC: 17 MG/DL (ref 7–17)
CALCIUM SERPL-MCNC: 9.5 MG/DL (ref 8.4–10.2)
EOSINOPHIL # BLD AUTO: 0 K/UL (ref 0–0.7)
EOSINOPHIL NFR BLD: 0 % (ref 0–4)
ERYTHROCYTE [DISTWIDTH] IN BLOOD BY AUTOMATED COUNT: 15 % (ref 11.5–14.5)
GFR NON-AFRICAN AMERICAN: > 60
HGB BLD-MCNC: 12.9 G/DL (ref 12–16)
LYMPHOCYTE: 4 % (ref 20–50)
LYMPHOCYTES # BLD AUTO: 0.2 K/UL (ref 1–4.3)
LYMPHOCYTES NFR BLD AUTO: 2.4 % (ref 20–40)
MCH RBC QN AUTO: 27.4 PG (ref 27–31)
MCHC RBC AUTO-ENTMCNC: 31.9 G/DL (ref 33–37)
MCV RBC AUTO: 85.9 FL (ref 81–99)
MONOCYTE: 3 % (ref 0–10)
MONOCYTES # BLD: 0.5 K/UL (ref 0–0.8)
MONOCYTES NFR BLD: 6.9 % (ref 0–10)
NEUTROPHILS # BLD: 6.8 K/UL (ref 1.8–7)
NEUTROPHILS NFR BLD AUTO: 90.3 % (ref 50–75)
NEUTROPHILS NFR BLD AUTO: 92 % (ref 42–75)
NRBC BLD AUTO-RTO: 0 % (ref 0–0)
PCO2 BLDV: 47 MMHG (ref 40–60)
PH BLDV: 7.4 [PH] (ref 7.32–7.43)
PLATELET # BLD EST: NORMAL 10*3/UL
PLATELET # BLD: 207 K/UL (ref 130–400)
PMV BLD AUTO: 8.1 FL (ref 7.2–11.7)
RBC # BLD AUTO: 4.71 MIL/UL (ref 3.8–5.2)
TOTAL CELLS COUNTED BLD: 100
VENOUS BLOOD FIO2: 21 %
VENOUS BLOOD GAS PO2: 38 MM/HG (ref 30–55)
WBC # BLD AUTO: 7.6 K/UL (ref 4.8–10.8)

## 2019-02-02 NOTE — CP.PCM.HP
<Jordy Son - Last Filed: 02/02/19 21:14>





History of Present Illness





- History of Present Illness


History of Present Illness: 


 Some of the Information were taken by daughter of patient 


This is a 88 yo female with PMH of COPD on Ox treatment 12h 9pm-9am, stent 

placed 2 years ago was brought by ambulance to hospital at 4 pm after she had a 

slip and fall from bed at 1am in morning while she was trying to stand to go to 

the toilet. Pt denies LOC or Head trauma. Pt was unable to move from floor and 

was calling for help, one of her neighbor heard her at 10am and called the 

ambulance but patient refused to go to hospital. 





Her daughter report that patient was confused/agitated, lips were blue, Unable 

to stand due to weakness, and had a urine incontinences. Daughter State that 

patient is normally healthy, she can walk with a walker, sharp in memory alert 

and oriented to time and place. Daughter state that she was seen last week by 

Surgeon Austen due to B/L hip pain that have been present for years, she was 

sent for CT scan and waiting result. Otherwise patient had a URI symptoms 2 

weeks ago which have improved except for the cough, she denies any fever,chills,

sob.





Pt denies any headache,dizziness, change in vision, Nausea, vomiting, chest 

pain, SOB, Abd Pain, loss of bowel movement. 





Allergy: Penicillins


Medication: Plavix, Aspirin, Zebeta, Imdur ER, Nebulizer once a day, albuterol 

pump prn 


PMH: COPD, Stent placed 2 year ago, Rheumatic fever as a child


PSH: Denies any surgery


Family history: denies any family history


Social: Former smoker quite 12 year ago, denies alcohol or drug use. Live alone,

use walker. 








ER course 


Pt was seen in ER, she was Alert oriented to time and place, Physical 

examination was WNL 


Vitals: Temp 98.8, HR 93, /76, RR 16 


Patient was placed on Monitor, IV NaCl + IV Vanco. 


CBC WNL except Neutrophil % 90.3


BMP: WNL except Glucose of 153 


VPG: WNL 


Trop x1 negative


Influenza A/B  negative 


Chest xray no acute changes


CT head: negative for bleed ( pending official read)


Pelvis Xray: Negative for fracture ( pending official read) 


Blood culture pending





Patient will be admitted to Telemetry to evaluate pneumonia/ ACS/ CVA  








Present on Admission





- Present on Admission


Any Indicators Present on Admission: No





Review of Systems





- Review of Systems


All systems: reviewed and no additional remarkable complaints except





- Constitutional


Constitutional: absent: Headache





- EENT


Eyes: absent: Blurred Vision, Change in Vision


Ears: absent: Decreased Hearing, Disequilibrium, Dizziness


Nose/Mouth/Throat: absent: Nasal Congestion, Nasal Discharge, Dysphagia, 

Hoarsness, Sore Throat, Neck Pain





- Cardiovascular


Cardiovascular: absent: Chest Pain, Chest Pain at Rest, Dyspnea on Exertion, 

Edema, Lightheadedness, Palpitations, Syncope





- Respiratory


Respiratory: absent: Cough, Dyspnea, Dyspnea on Exertion, Pain with Coughing





- Gastrointestinal


Gastrointestinal: absent: Abdominal Pain, Bloating, Dysphagia, Loose Stools, 

Vomiting





- Genitourinary


Genitourinary: Urinary Incontinence.  absent: Hematuria, Nocturia, Urinary 

Frequency, Freq UTI





- Musculoskeletal


Musculoskeletal: Muscle Weakness.  absent: Stiffness, Tingling





- Neurological


Neurological: absent: Abnormal Gait, Abnormal Hearing, Abnormal Movements, 

Abnormal Speech, Confusion, Dizziness, Numbness, Focal Weakness, Frequent Falls,

Headaches, Lack of Coordination, Loss of Vision, Memory Loss, Syncope, Tremor, 

Vertigo





- Psychiatric


Psychiatric: absent: Abnormal Sleep Pattern, Anxiety, Panic Attacks, Paranoia





Past Patient History





- Infectious Disease


Hx of Infectious Diseases: None





- Tetanus Immunizations


Tetanus Immunization: Unknown





- Past Medical History & Family History


Past Medical History?: Yes





- Past Social History


Smoking Status: Former Smoker


Alcohol: None





- CARDIAC


Hx Atrial Fibrillation: Yes


Hx Congestive Heart Failure: Yes


Hx Hypercholesterolemia: Yes


Hx Hypertension: Yes


Hx Pacemaker: No


Hx Peripheral Edema: Yes





- PULMONARY


Hx Chronic Obstructive Pulmonary Disease (COPD): Yes


Hx Emphysema: Yes


Hx Pneumonia: Yes





- NEUROLOGICAL


Hx Paralysis: No





- HEENT


Hx HEENT Problems: No





- RENAL


Hx Chronic Kidney Disease: No





- ENDOCRINE/METABOLIC


Hx Endocrine Disorders: No





- HEMATOLOGICAL/ONCOLOGICAL


Hx Human Immunodeficiency Virus (HIV): No





- INTEGUMENTARY


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





- MUSCULOSKELETAL/RHEUMATOLOGICAL


Hx Fractures: Yes (right ankle)





- GASTROINTESTINAL


Hx Gastrointestinal Disorders: No





- GENITOURINARY/GYNECOLOGICAL


Hx Genitourinary Disorders: Yes


Other/Comment: Stress Inc





- PSYCHIATRIC


Hx Psychophysiologic Disorder: No


Hx Substance Use: No





- SURGICAL HISTORY


Hx Appendectomy: No


Hx Coronary Stent: Yes (x 2)





- ANESTHESIA


Hx Anesthesia Reactions: No


Hx Malignant Hyperthermia: No





Meds


Allergies/Adverse Reactions: 


                                    Allergies











Allergy/AdvReac Type Severity Reaction Status Date / Time


 


Penicillins Allergy Mild SWELLING Verified 02/02/19 17:25














Physical Exam





- Constitutional


Appears: Well, Non-toxic, No Acute Distress





- Head Exam


Head Exam: ATRAUMATIC, NORMAL INSPECTION, NORMOCEPHALIC





- Eye Exam


Eye Exam: EOMI, Normal appearance, PERRL


Pupil Exam: NORMAL ACCOMODATION, PERRL





- ENT Exam


ENT Exam: Mucous Membranes Moist, Normal Exam





- Neck Exam


Neck exam: Positive for: Normal Inspection





- Respiratory Exam


Respiratory Exam: Clear to Auscultation Bilateral, NORMAL BREATHING PATTERN.  

absent: Accessory Muscle Use, Chest Wall Tenderness, Rales, Rhonchi, Wheezes, 

Respiratory Distress, Stridor





- Cardiovascular Exam


Cardiovascular Exam: REGULAR RHYTHM, +S1, +S2.  absent: Irregular Rhythm, 

Systolic Murmur





- GI/Abdominal Exam


GI & Abdominal Exam: Normal Bowel Sounds, Soft.  absent: Pulsatile Mass





- Extremities Exam


Extremities exam: Positive for: full ROM, normal capillary refill, normal 

inspection, pedal pulses present.  Negative for: joint swelling, pedal edema, 

tenderness





- Back Exam


Back exam: absent: CVA tenderness (L), CVA tenderness (R)





- Neurological Exam


Neurological exam: Alert, CN II-XII Intact





- Psychiatric Exam


Psychiatric exam: Normal Affect, Normal Mood





- Skin


Skin Exam: Dry, Intact, Normal Color, Warm





Results





- Vital Signs


Recent Vital Signs: 





                                Last Vital Signs











Temp  98.8 F   02/02/19 17:26


 


Pulse  93 H  02/02/19 17:26


 


Resp  16   02/02/19 17:26


 


BP  129/76   02/02/19 17:26


 


Pulse Ox  96   02/02/19 19:09














- Labs


Result Diagrams: 


                                 02/02/19 18:05





                                 02/02/19 18:05


Labs: 





                         Laboratory Results - last 24 hr











  02/02/19 02/02/19 02/02/19





  17:36 18:00 18:05


 


WBC    7.6


 


RBC    4.71


 


Hgb    12.9


 


Hct    40.5


 


MCV    85.9  D


 


MCH    27.4


 


MCHC    31.9 L


 


RDW    15.0 H


 


Plt Count    207


 


MPV    8.1


 


Neut % (Auto)    90.3 H


 


Lymph % (Auto)    2.4 L


 


Mono % (Auto)    6.9


 


Eos % (Auto)    0.0


 


Baso % (Auto)    0.4


 


Neut # (Auto)    6.8


 


Lymph # (Auto)    0.2 L


 


Mono # (Auto)    0.5


 


Eos # (Auto)    0.0


 


Baso # (Auto)    0.0


 


pO2   38 


 


VBG pH   7.40 


 


VBG pCO2   47 


 


VBG HCO3   27.0 


 


VBG Total CO2   30.5 H 


 


VBG O2 Sat (Calc)   77.0 H 


 


VBG Base Excess   3.5 H 


 


VBG Potassium   4.0 


 


Sodium   134.0 


 


Chloride   102.0 


 


Glucose   156 H 


 


Lactate   1.3 


 


FiO2   21.0 


 


Potassium   


 


Carbon Dioxide   


 


Anion Gap   


 


BUN   


 


Creatinine   


 


Est GFR ( Amer)   


 


Est GFR (Non-Af Amer)   


 


POC Glucose (mg/dL)  141 H  


 


Random Glucose   


 


Calcium   


 


Total Bilirubin   


 


AST   


 


ALT   


 


Alkaline Phosphatase   


 


Total Creatine Kinase   


 


Troponin I   


 


Total Protein   


 


Albumin   


 


Globulin   


 


Albumin/Globulin Ratio   


 


Venous Blood Potassium   4.0 


 


Influenza Typ A,B (EIA)   














  02/02/19 02/02/19 02/02/19





  18:05 18:21 18:30


 


WBC   


 


RBC   


 


Hgb   


 


Hct   


 


MCV   


 


MCH   


 


MCHC   


 


RDW   


 


Plt Count   


 


MPV   


 


Neut % (Auto)   


 


Lymph % (Auto)   


 


Mono % (Auto)   


 


Eos % (Auto)   


 


Baso % (Auto)   


 


Neut # (Auto)   


 


Lymph # (Auto)   


 


Mono # (Auto)   


 


Eos # (Auto)   


 


Baso # (Auto)   


 


pO2   


 


VBG pH   


 


VBG pCO2   


 


VBG HCO3   


 


VBG Total CO2   


 


VBG O2 Sat (Calc)   


 


VBG Base Excess   


 


VBG Potassium   


 


Sodium  137  


 


Chloride  95 L  


 


Glucose   


 


Lactate   


 


FiO2   


 


Potassium  4.3  


 


Carbon Dioxide  30  


 


Anion Gap  16  


 


BUN  17  


 


Creatinine  0.7  


 


Est GFR ( Amer)  > 60  


 


Est GFR (Non-Af Amer)  > 60  


 


POC Glucose (mg/dL)   


 


Random Glucose  153 H  


 


Calcium  9.5  


 


Total Bilirubin  0.8  


 


AST  27  


 


ALT  28  


 


Alkaline Phosphatase  124  


 


Total Creatine Kinase    212 H


 


Troponin I  0.0410  


 


Total Protein  7.0  


 


Albumin  3.9  


 


Globulin  3.1  


 


Albumin/Globulin Ratio  1.3  


 


Venous Blood Potassium   


 


Influenza Typ A,B (EIA)   Negative for flu a/b 














Assessment & Plan





- Assessment and Plan (Free Text)


Assessment: 


A 88 yo female with PMH of COPD on Ox treatment 12h 9pm-9am, stent placed 2 

years ago was brought by ambulance to hospital due fall, will be admitted to 

Telemetry to evaluate CVA, Pneumonia, ACS.





Evaluate for Pneumonia vs COPD exacerbation 


Vitals WNL, Afebrile, oxygen 94% 


+ Cough


Hx of URI 2 weeks ago


S/P IV vanco Once


Chest X-ray 1x negative


CBC WNL except Neutrophil %90.3


F/U Blood culture


Repeat Chest X-ray in morning after patient been hydrated


Continue Ox supp


Monitor for exacerbation





Evaluate ACS


Hx of stent 2 year ago 


Trop x1 negative


F/U Trop x2 


F/U Echo


Continue home medication





Evaluate to CVA 


+ for acute episode of agitation and confusion, with weakness 


CN II-XII intact


CT head No acute change, follow up official read


Neuro consult, F/u Recommendation


F/U Carotid dobbler us





COPD


Hx of COPD


On oxygen supp 12h a day + nebulizer once


Pulm consulted


Continue home medication 


Monitor Oxygen





Diet


Heart Healthy diet





DVT prophylaxis


Lovenox 40sc





Code


Full Code 





 











<Ant Booker D - Last Filed: 02/03/19 09:20>





Results





- Vital Signs


Recent Vital Signs: 





                                Last Vital Signs











Temp  100.2 F H  02/03/19 08:00


 


Pulse  94 H  02/03/19 09:07


 


Resp  18   02/03/19 08:00


 


BP  129/73   02/03/19 09:07


 


Pulse Ox  93 L  02/03/19 08:00














- Labs


Result Diagrams: 


                                 02/03/19 06:10





                                 02/03/19 06:10


Labs: 





                         Laboratory Results - last 24 hr











  02/02/19 02/02/19 02/02/19





  17:36 18:00 18:05


 


WBC    7.6


 


RBC    4.71


 


Hgb    12.9


 


Hct    40.5


 


MCV    85.9  D


 


MCH    27.4


 


MCHC    31.9 L


 


RDW    15.0 H


 


Plt Count    207


 


MPV    8.1


 


Neut % (Auto)    90.3 H


 


Lymph % (Auto)    2.4 L


 


Mono % (Auto)    6.9


 


Eos % (Auto)    0.0


 


Baso % (Auto)    0.4


 


Neut # (Auto)    6.8


 


Lymph # (Auto)    0.2 L


 


Mono # (Auto)    0.5


 


Eos # (Auto)    0.0


 


Baso # (Auto)    0.0


 


Neutrophils % (Manual)    92 H


 


Lymphocytes % (Manual)    4 L


 


Monocytes % (Manual)    3


 


Basophils % (Manual)    1


 


Platelet Estimate    Normal


 


pO2   38 


 


VBG pH   7.40 


 


VBG pCO2   47 


 


VBG HCO3   27.0 


 


VBG Total CO2   30.5 H 


 


VBG O2 Sat (Calc)   77.0 H 


 


VBG Base Excess   3.5 H 


 


VBG Potassium   4.0 


 


Sodium   134.0 


 


Chloride   102.0 


 


Glucose   156 H 


 


Lactate   1.3 


 


FiO2   21.0 


 


Potassium   


 


Carbon Dioxide   


 


Anion Gap   


 


BUN   


 


Creatinine   


 


Est GFR ( Amer)   


 


Est GFR (Non-Af Amer)   


 


POC Glucose (mg/dL)  141 H  


 


Random Glucose   


 


Calcium   


 


Total Bilirubin   


 


AST   


 


ALT   


 


Alkaline Phosphatase   


 


Total Creatine Kinase   


 


Troponin I   


 


Total Protein   


 


Albumin   


 


Globulin   


 


Albumin/Globulin Ratio   


 


Venous Blood Potassium   4.0 


 


Influenza Typ A,B (EIA)   














  02/02/19 02/02/19 02/02/19





  18:05 18:21 18:30


 


WBC   


 


RBC   


 


Hgb   


 


Hct   


 


MCV   


 


MCH   


 


MCHC   


 


RDW   


 


Plt Count   


 


MPV   


 


Neut % (Auto)   


 


Lymph % (Auto)   


 


Mono % (Auto)   


 


Eos % (Auto)   


 


Baso % (Auto)   


 


Neut # (Auto)   


 


Lymph # (Auto)   


 


Mono # (Auto)   


 


Eos # (Auto)   


 


Baso # (Auto)   


 


Neutrophils % (Manual)   


 


Lymphocytes % (Manual)   


 


Monocytes % (Manual)   


 


Basophils % (Manual)   


 


Platelet Estimate   


 


pO2   


 


VBG pH   


 


VBG pCO2   


 


VBG HCO3   


 


VBG Total CO2   


 


VBG O2 Sat (Calc)   


 


VBG Base Excess   


 


VBG Potassium   


 


Sodium  137  


 


Chloride  95 L  


 


Glucose   


 


Lactate   


 


FiO2   


 


Potassium  4.3  


 


Carbon Dioxide  30  


 


Anion Gap  16  


 


BUN  17  


 


Creatinine  0.7  


 


Est GFR ( Amer)  > 60  


 


Est GFR (Non-Af Amer)  > 60  


 


POC Glucose (mg/dL)   


 


Random Glucose  153 H  


 


Calcium  9.5  


 


Total Bilirubin  0.8  


 


AST  27  


 


ALT  28  


 


Alkaline Phosphatase  124  


 


Total Creatine Kinase    212 H


 


Troponin I  0.0410  


 


Total Protein  7.0  


 


Albumin  3.9  


 


Globulin  3.1  


 


Albumin/Globulin Ratio  1.3  


 


Venous Blood Potassium   


 


Influenza Typ A,B (EIA)   Negative for flu a/b 














  02/03/19 02/03/19 02/03/19





  01:38 06:10 06:10


 


WBC    5.4


 


RBC    4.37


 


Hgb    12.2


 


Hct    37.6


 


MCV    86.0


 


MCH    27.9


 


MCHC    32.4 L


 


RDW    15.6 H


 


Plt Count    167


 


MPV    7.9


 


Neut % (Auto)    84.9 H


 


Lymph % (Auto)    5.6 L


 


Mono % (Auto)    8.9


 


Eos % (Auto)    0.2


 


Baso % (Auto)    0.4


 


Neut # (Auto)    4.6


 


Lymph # (Auto)    0.3 L


 


Mono # (Auto)    0.5


 


Eos # (Auto)    0.0


 


Baso # (Auto)    0.0


 


Neutrophils % (Manual)   


 


Lymphocytes % (Manual)   


 


Monocytes % (Manual)   


 


Basophils % (Manual)   


 


Platelet Estimate   


 


pO2   


 


VBG pH   


 


VBG pCO2   


 


VBG HCO3   


 


VBG Total CO2   


 


VBG O2 Sat (Calc)   


 


VBG Base Excess   


 


VBG Potassium   


 


Sodium   140 


 


Chloride   96 L 


 


Glucose   


 


Lactate   


 


FiO2   


 


Potassium   3.7 


 


Carbon Dioxide   28 


 


Anion Gap   20 


 


BUN   14 


 


Creatinine   0.6 L 


 


Est GFR ( Amer)   > 60 


 


Est GFR (Non-Af Amer)   > 60 


 


POC Glucose (mg/dL)   


 


Random Glucose   109 H 


 


Calcium   8.6 


 


Total Bilirubin   


 


AST   


 


ALT   


 


Alkaline Phosphatase   


 


Total Creatine Kinase   302 H 


 


Troponin I  0.0510  0.0460 


 


Total Protein   


 


Albumin   


 


Globulin   


 


Albumin/Globulin Ratio   


 


Venous Blood Potassium   


 


Influenza Typ A,B (EIA)   














Attending/Attestation





- Attestation


I have personally seen and examined this patient.: Yes


I have fully participated in the care of the patient.: Yes


I have reviewed all pertinent clinical information: Yes


Notes (Text): 





02/03/19 09:18


Patient seen and examined with resident. Case discussed and agreed with 

assessment and plan of management.

## 2019-02-02 NOTE — ED PDOC
Syncope/Near Syncope/Dizziness


Time Seen by Provider: 02/02/19 17:31


Chief Complaint (Nursing): Weakness/Neurological Deficit


History Per: Family


Onset/Duration Of Symptoms: Days (1)


Current Symptoms Are (Timing): Still Present


Seizure Or Post-ictal Symptoms: None


Fall Associated With With Symptoms: Yes


Additional Complaint(s): 





Pt fell out of bed last night and was unable to get up off floor. Denies injury.

Family notes that pt has been increasingly confused and weak, unable to stand. 

Has also been incontinent of urine today. Has had persistent cough over past 2 w

eeks Denies SOB. Denies fever at home.





Past Medical History


Vital Signs: 





                                Last Vital Signs











Temp  98.8 F   02/02/19 17:26


 


Pulse  93 H  02/02/19 17:26


 


Resp  16   02/02/19 17:26


 


BP  129/76   02/02/19 17:26


 


Pulse Ox  96   02/02/19 17:26














- Medical History


PMH: Atrial Fibrillation, CAD, CHF, COPD, Emphysema, Fractures (right ankle), 

HTN, Hypercholesterolemia, Peripheral Edema, Pneumonia


   Denies: Diabetes, HIV, Chronic Kidney Disease





- Surgical History


Surgical History: Coronary Stent (x 2)


   Denies: Appendectomy, Pacemaker





- Family History


Family History: States: Unknown Family Hx





- Home Medications


Home Medications: 


                                Ambulatory Orders











 Medication  Instructions  Recorded


 


Aspirin [Ecotrin] 81 mg PO DAILY 06/20/16


 


Bisoprolol [Zebeta] 5 mg PO DAILY 06/20/16


 


Clopidogrel [Plavix] 75 mg PO DAILY 06/20/16


 


Isosorbide Mononitrate ER [Imdur 30 mg PO DAILY 06/20/16





ER]  


 


Lisinopril [Zestril] 2.5 mg PO DAILY 06/20/16














- Allergies


Allergies/Adverse Reactions: 


                                    Allergies











Allergy/AdvReac Type Severity Reaction Status Date / Time


 


Penicillins Allergy Mild SWELLING Verified 02/02/19 17:25














Review of Systems


ROS Statement: Except As Marked, All Systems Reviewed And Found Negative


Respiratory: Positive for: Cough


Genitourinary Female: Positive for: Incontinence


Neurological: Positive for: Confusion





Physical Exam





- Reviewed


Nursing Documentation Reviewed: Yes


Vital Signs Reviewed: Yes





- Physical Exam


Appears: Positive for: Non-toxic, No Acute Distress


Head Exam: Positive for: ATRAUMATIC, NORMAL INSPECTION, NORMOCEPHALIC


Skin: Positive for: Normal Color, Warm, DRY


Eye Exam: Positive for: EOMI, Normal appearance, PERRL


ENT: Positive for: Other (Mucous membranes dry)


Neck: Positive for: Normal, Painless ROM


Cardiovascular/Chest: Positive for: Regular Rate, Rhythm


Respiratory: Positive for: Rhonchi.  Negative for: Wheezing, Respiratory 

Distress


Gastrointestinal/Abdominal: Positive for: Normal Exam, Soft


Back: Positive for: Normal Inspection


Extremity: Positive for: Normal ROM


Neurologic/Psych: Positive for: Alert, Oriented (x 2).  Negative for: 

Motor/Sensory Deficits





- Laboratory Results


Result Diagrams: 


                                 02/02/19 18:05





                                 02/02/19 18:05





- ECG


O2 Sat by Pulse Oximetry: 96





Medical Decision Making


Medical Decision Making: 





AMS, confused, appears dehydrated, possible sepsis/pneumonia/UTI


Will hydrate while assessing





Disposition





- Clinical Impression


Clinical Impression: 


 Syncope, Altered mental state








- Patient ED Disposition


Is Patient to be Admitted: Yes





- Disposition


Disposition Time: 19:09


Condition: FAIR


Forms:  [facility] (English)





- Pt Status Changed To:


Hospital Disposition Of: Inpatient





- Admit Certification


Admit to Inpatient:: After my assessment, the patient will require 

hospitalization for at least two midnights.  This is because of the severity of 

symptoms shown, intensity of services needed, and/or the medical risk in this 

patient being treated as an outpatient.





- POA


Present On Arrival: None

## 2019-02-03 LAB
BASOPHILS # BLD AUTO: 0 K/UL (ref 0–0.2)
BASOPHILS NFR BLD: 0.4 % (ref 0–2)
BUN SERPL-MCNC: 14 MG/DL (ref 7–17)
CALCIUM SERPL-MCNC: 8.6 MG/DL (ref 8.4–10.2)
EOSINOPHIL # BLD AUTO: 0 K/UL (ref 0–0.7)
EOSINOPHIL NFR BLD: 0.2 % (ref 0–4)
ERYTHROCYTE [DISTWIDTH] IN BLOOD BY AUTOMATED COUNT: 15.6 % (ref 11.5–14.5)
GFR NON-AFRICAN AMERICAN: > 60
HGB BLD-MCNC: 12.2 G/DL (ref 12–16)
LYMPHOCYTES # BLD AUTO: 0.3 K/UL (ref 1–4.3)
LYMPHOCYTES NFR BLD AUTO: 5.6 % (ref 20–40)
MCH RBC QN AUTO: 27.9 PG (ref 27–31)
MCHC RBC AUTO-ENTMCNC: 32.4 G/DL (ref 33–37)
MCV RBC AUTO: 86 FL (ref 81–99)
MONOCYTES # BLD: 0.5 K/UL (ref 0–0.8)
MONOCYTES NFR BLD: 8.9 % (ref 0–10)
NEUTROPHILS # BLD: 4.6 K/UL (ref 1.8–7)
NEUTROPHILS NFR BLD AUTO: 84.9 % (ref 50–75)
NRBC BLD AUTO-RTO: 0 % (ref 0–0)
PLATELET # BLD: 167 K/UL (ref 130–400)
PMV BLD AUTO: 7.9 FL (ref 7.2–11.7)
RBC # BLD AUTO: 4.37 MIL/UL (ref 3.8–5.2)
TROPONIN I SERPL-MCNC: 0.05 NG/ML (ref 0–0.12)
WBC # BLD AUTO: 5.4 K/UL (ref 4.8–10.8)

## 2019-02-03 RX ADMIN — IPRATROPIUM BROMIDE AND ALBUTEROL SULFATE SCH ML: .5; 3 SOLUTION RESPIRATORY (INHALATION) at 15:43

## 2019-02-03 RX ADMIN — IPRATROPIUM BROMIDE AND ALBUTEROL SULFATE SCH ML: .5; 3 SOLUTION RESPIRATORY (INHALATION) at 19:30

## 2019-02-03 RX ADMIN — STANDARDIZED SENNA CONCENTRATE AND DOCUSATE SODIUM SCH TAB: 8.6; 5 TABLET, FILM COATED ORAL at 21:33

## 2019-02-03 NOTE — RAD
PROCEDURE:  Radiographs of the pelvis.



HISTORY:

Trauma



COMPARISON:

CT pelvis with contrast performed 1/28/19



FINDINGS:

Osseous demineralization.  Degenerative changes including bilateral 

joint space narrowing.  No acute displaced fracture or dislocation.  

No significant joint effusion appreciated.



Soft tissues appear unremarkable. No evidence of radiopaque foreign 

body.



Oral contrast within the colon.  Closest. 



IMPRESSION:

No acute displaced fracture identified.

## 2019-02-03 NOTE — RAD
HISTORY:

 cough 



COMPARISON:

Chest x-ray performed 9/13/17 



TECHNIQUE:

Chest, one view.



FINDINGS:





LUNGS:

Chronic appearing prominent interstitial markings. Medial right lower 

lobe atelectasis/infiltrate. 



Please note that chest x-ray has limited sensitivity for the 

detection of pulmonary masses.



PLEURA:

No significant pleural effusion identified. No definite pneumothorax .



CARDIOVASCULAR:

Cardiomegaly.  Ectatic aorta.  Atherosclerotic calcifications of the 

aorta.



OSSEOUS STRUCTURES:

No acute osseous abnormality identified.



VISUALIZED UPPER ABDOMEN:

Unremarkable.



OTHER FINDINGS:

None.



IMPRESSION:

Chronic appearing prominent interstitial markings.  Medial right 

lower lobe atelectasis/infiltrate. 



Cardiomegaly.  Ectatic aorta.  Atherosclerotic calcifications of the 

aorta.

## 2019-02-03 NOTE — CP.PCM.PN
<Lashay Quintero - Last Filed: 02/03/19 18:14>





Subjective





- Date & Time of Evaluation


Date of Evaluation: 02/03/19


Time of Evaluation: 10:15





- Subjective


Subjective: 





86 y/o F with hx of COPD was admitted for evaluation of syncope. Patient was 

seen and examined this AM. Patient on 2 L of oxygen via NC breathing 

comfortably, sitting upright in bed. Patient is still complaining of cough, and 

fatigue but denied any fever, chills, chest pain or shortness of breath. 





Objective





- Vital Signs/Intake and Output


Vital Signs (last 24 hours): 


                                        











Temp Pulse Resp BP Pulse Ox


 


 100.2 F H  94 H  18   129/73   93 L


 


 02/03/19 08:00  02/03/19 09:07  02/03/19 08:00  02/03/19 09:07  02/03/19 08:00











- Medications


Medications: 


                               Current Medications





Aspirin (Ecotrin)  81 mg PO DAILY [facility]


   Last Admin: 02/03/19 09:07 Dose:  81 mg


Bisoprolol Fumarate (Zebeta)  5 mg PO DAILY [facility]


   Last Admin: 02/03/19 09:07 Dose:  5 mg


Clopidogrel Bisulfate (Plavix)  75 mg PO DAILY [facility]


   Last Admin: 02/03/19 09:07 Dose:  75 mg


Azithromycin 500 mg/ Sodium (Chloride)  250 mls @ 250 mls/hr IVPB DAILY [facility]; 

Protocol


Isosorbide Mononitrate (Imdur Er)  30 mg PO DAILY [facility]


   Last Admin: 02/03/19 09:07 Dose:  30 mg


Lisinopril (Zestril)  2.5 mg PO DAILY [facility]


   Last Admin: 02/03/19 09:07 Dose:  2.5 mg











- Labs


Labs: 


                                        





                                 02/03/19 06:10 





                                 02/03/19 06:10 











- Head Exam


Head Exam: ATRAUMATIC





- Eye Exam


Eye Exam: EOMI





- ENT Exam


ENT Exam: Mucous Membranes Moist





- Respiratory Exam


Respiratory Exam: absent: Rhonchi, Wheezes


Additional comments: 





decreased breath sounds





- Cardiovascular Exam


Cardiovascular Exam: RRR, +S1, +S2





- GI/Abdominal Exam


GI & Abdominal Exam: Soft.  absent: Guarding, Rigid, Tenderness





- Extremities Exam


Extremities Exam: absent: Calf Tenderness





- Neurological Exam


Neurological Exam: Alert, Awake, Oriented x3





- Psychiatric Exam


Psychiatric exam: Normal Mood





- Skin


Skin Exam: Dry, Intact





Assessment and Plan





- Assessment and Plan (Free Text)


Assessment: 





86 y/o F with PMH of COPD on home oxygen (9pm-9am), stent placed 2 years ago was

admitted for evaluation of syncope & possible pneumonia. 





1. Syncope


-Head CT showed generalized atrophy, nonspecific white changes, no mass effect 

or edema.


-VS have been stable. 


-FU official report of carotid doppler. 


-Echo ordered but unable to be done today. FU tomorrow. Last echo in 9/16/2017 

which showed EF of 50-55%, moderate mitral regurg/tricuspid regurg, severe 

pulmonary HTN. 





2. Cough with hx of COPD(Acute on chronic)


-2/2/2019 CXR showed medical right lower lobe atelectasis/infiltrate as per Dr. Jama. 


-FU with pulm consult with Dr. Pyle in the AM. 


-She received Vanco yesterday.


-Continue Azithromycin 500mg IV.


-Continue 2 L oxygen via NC.


-WBC decreased to 5.4 today from 7.6 yesterday.


-VS wnl.


-Continue supplemental ox.


-Continue to monitor for exacerbation.


-Continue duonebs.





3. hx of stent placement


-Continue aspirin & plavix. 





4. Diet


Heart Healthy diet





5. DVT prophylaxis


Lovenox 40sc





<Ant Booker D - Last Filed: 02/03/19 19:37>





Objective





- Vital Signs/Intake and Output


Vital Signs (last 24 hours): 


                                        











Temp Pulse Resp BP Pulse Ox


 


 100.0 F H  87   16   128/67   95 


 


 02/03/19 16:03  02/03/19 16:04  02/03/19 16:03  02/03/19 16:03  02/03/19 16:03











- Medications


Medications: 


                               Current Medications





Albuterol/Ipratropium (Duoneb 3 Mg/0.5 Mg (3 Ml) Ud)  3 ml INH RQID [facility]


   Last Admin: 02/03/19 19:30 Dose:  3 ml


Aspirin (Ecotrin)  81 mg PO DAILY [facility]


   Last Admin: 02/03/19 09:07 Dose:  81 mg


Bisoprolol Fumarate (Zebeta)  5 mg PO DAILY [facility]


   Last Admin: 02/03/19 09:07 Dose:  5 mg


Clopidogrel Bisulfate (Plavix)  75 mg PO DAILY [facility]


   Last Admin: 02/03/19 09:07 Dose:  75 mg


Azithromycin 500 mg/ Sodium (Chloride)  250 mls @ 250 mls/hr IVPB DAILY [facility]; 

Protocol


   Last Admin: 02/03/19 12:04 Dose:  250 mls/hr


Isosorbide Mononitrate (Imdur Er)  30 mg PO DAILY [facility]


   Last Admin: 02/03/19 09:07 Dose:  30 mg


Lisinopril (Zestril)  2.5 mg PO DAILY [facility]


   Last Admin: 02/03/19 09:07 Dose:  2.5 mg


Senna/Docusate Sodium (Senokot S 50 Mg-8.6 Mg)  2 tab PO HS [facility]











- Labs


Labs: 


                                        





                                 02/03/19 06:10 





                                 02/03/19 06:10 











Attending/Attestation





- Attestation


I have personally seen and examined this patient.: Yes


I have fully participated in the care of the patient.: Yes


I have reviewed all pertinent clinical information, including history, physical 

exam and plan: Yes


Notes (Text): 





02/03/19 19:37


Patient seen and examined with resident. Case discussed and agreed with 

assessment.

## 2019-02-03 NOTE — CP.PCM.CON
History of Present Illness





- History of Present Illness


History of Present Illness: 





Neurology Consultation Note: Consult requested by Dr. Love 





Mrs. Charles is an 87-year-old woman with a past medical history of COPD on home 

O2, CAD s/p stent, who had a fall due to weakness yesterday and was found to be 

confused and incontinent of urine by her daughter the following morning. CT scan

of the head showed generalized atrophy, but no acute findings. 





When I saw the patient, she was awake, but somnolent and said that she feels 

weak all over.  She does not know why she could not stand up after falling out 

of bed when she was home. 





Review of Systems





- Constitutional


Constitutional: As Per HPI





- EENT


Eyes: absent: As Per HPI, Blind Spots, Blurred Vision, Change in Vision, 

Decreased Night Vision, Diplopia, Discharge, Dry Eye, Exophthalmos, Floaters, 

Irritation, Itchy Eyes, Loss of Peripheral Vision, Pain, Photophobia, Requires 

Corrective Lenses, Sees Flashes, Spots in Vision, Tunnel Vision, Other Visual Di

sturbances, Loss of Vision, Other


Ears: absent: As Per HPI, Decreased Hearing, Ear Discharge, Ear Pain, Tinnitus, 

Abnormal Hearing, Disequilibrium, Dizziness, Other


Nose/Mouth/Throat: absent: As Per HPI, Epistaxis, Nasal Congestion, Nasal 

Discharge, Nasal Obstruction, Nasal Trauma, Nose Pain, Post Nasal Drip, Sinus 

Pain, Sinus Pressure, Bleeding Gums, Change in Voice, Dental Pain, Dry Mouth, 

Dysphagia, Halitosis, Hoarsness, Lip Swelling, Mouth Lesions, Mouth Pain, 

Odynophagia, Sore Throat, Throat Swelling, Tongue Swelling, Facial Pain, Neck 

Pain, Neck Mass, Other





- Cardiovascular


Cardiovascular: As Per HPI





- Respiratory


Respiratory: As Per HPI





- Gastrointestinal


Gastrointestinal: absent: As Per HPI, Abdominal Pain, Belching, Bloating, Change

in Bowel Habits, Change in Stool Character, Coffee Ground Emesis, Constipation, 

Cramping, Diarrhea, Dyspepsia, Dysphagia, Early Satiety, Excessive Flatus, Fecal

Incontinence, Heartburn, Hematemesis, Hematochezia, Loose Stools, Melena, 

Nausea, Odynophagia, Temesmus, Vomiting, Other





- Musculoskeletal


Musculoskeletal: absent: As Per HPI, Abnormal Gait, Arthralgias, Atrophy, Back 

Pain, Deformity, Joint Swelling, Limited Range of Motion, Loss of Height, Muscle

Cramps, Muscle Weakness, Myalgias, Neck Pain, Numbness, Radiating Pain into 

Limb, Stiffness, Tingling, Other





- Neurological


Neurological: As Per HPI





- Psychiatric


Psychiatric: absent: As Per HPI, Abnormal Sleep Pattern, Anhedonia, Anxiety, 

Auditory Hallucinations, Behavioral Changes, Change in Appetite, Change in 

Libido, Confusion, Depression, Difficulty Concentrating, Hallucinations, 

Homicidal Ideation, Hopelessness, Irritability, Memory Loss, Mood Swings, Panic 

Attacks, Paranoia, Suicidal Ideation, Visual Hallucinations, Tactile 

Hallucinations, Other





- Endocrine


Endocrine: absent: As Per HPI, Change in Body Appearance, Change in Libido, Cold

Intolorance, Deepening of Voice, Excessive Sweating, Fatigue, Flushing, Heat 

Intolorance, Increase in Ring/Shoe/Hat Size, Palpitations, Polydipsia, 

Polyphagia, Polyuria, Other





- Hematologic/Lymphatic


Hematologic: absent: As Per HPI, Easy Bleeding, Easy Bruising, Lymphadenopathy, 

Other





Past Patient History





- Infectious Disease


Hx of Infectious Diseases: None





- Tetanus Immunizations


Tetanus Immunization: Unknown





- Past Medical History & Family History


Past Medical History?: Yes





- Past Social History


Smoking Status: Never Smoked





- CARDIAC


Hx Atrial Fibrillation: Yes


Hx Congestive Heart Failure: Yes


Hx Hypercholesterolemia: Yes


Hx Hypertension: Yes


Hx Pacemaker: No


Hx Peripheral Edema: Yes





- PULMONARY


Hx Chronic Obstructive Pulmonary Disease (COPD): Yes


Hx Emphysema: Yes


Hx Pneumonia: Yes





- NEUROLOGICAL


Hx Paralysis: No





- HEENT


Hx HEENT Problems: No





- RENAL


Hx Chronic Kidney Disease: No





- ENDOCRINE/METABOLIC


Hx Endocrine Disorders: No





- HEMATOLOGICAL/ONCOLOGICAL


Hx Human Immunodeficiency Virus (HIV): No





- INTEGUMENTARY


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





- MUSCULOSKELETAL/RHEUMATOLOGICAL


Hx Falls: Yes


Hx Fractures: Yes (right ankle)





- GASTROINTESTINAL


Hx Gastrointestinal Disorders: No





- GENITOURINARY/GYNECOLOGICAL


Hx Genitourinary Disorders: Yes


Hx Incontinence: Yes


Other/Comment: Stress Inc





- PSYCHIATRIC


Hx Psychophysiologic Disorder: No


Hx Substance Use: No





- SURGICAL HISTORY


Hx Appendectomy: No


Hx Coronary Stent: Yes (x 2)





- ANESTHESIA


Hx Anesthesia: Yes


Hx Anesthesia Reactions: No


Hx Malignant Hyperthermia: No


Has any member of the family had a problem w/ anesthesia?: No





Meds


Allergies/Adverse Reactions: 


                                    Allergies











Allergy/AdvReac Type Severity Reaction Status Date / Time


 


Penicillins Allergy Mild SWELLING Verified 02/02/19 17:25














- Medications


Medications: 


                               Current Medications





Albuterol/Ipratropium (Duoneb 3 Mg/0.5 Mg (3 Ml) Ud)  3 ml INH RQID [facility]


   Last Admin: 02/03/19 15:43 Dose:  3 ml


Aspirin (Ecotrin)  81 mg PO DAILY [facility]


   Last Admin: 02/03/19 09:07 Dose:  81 mg


Bisoprolol Fumarate (Zebeta)  5 mg PO DAILY [facility]


   Last Admin: 02/03/19 09:07 Dose:  5 mg


Clopidogrel Bisulfate (Plavix)  75 mg PO DAILY [facility]


   Last Admin: 02/03/19 09:07 Dose:  75 mg


Azithromycin 500 mg/ Sodium (Chloride)  250 mls @ 250 mls/hr IVPB DAILY [facility]; Pro

tocol


   Last Admin: 02/03/19 12:04 Dose:  250 mls/hr


Isosorbide Mononitrate (Imdur Er)  30 mg PO DAILY [facility]


   Last Admin: 02/03/19 09:07 Dose:  30 mg


Lisinopril (Zestril)  2.5 mg PO DAILY [facility]


   Last Admin: 02/03/19 09:07 Dose:  2.5 mg


Senna/Docusate Sodium (Senokot S 50 Mg-8.6 Mg)  2 tab PO [facility]











Physical Exam





- Constitutional


Appears: Well





- Head Exam


Head Exam: ATRAUMATIC, NORMAL INSPECTION, NORMOCEPHALIC





- Eye Exam


Eye Exam: EOMI, Normal appearance, PERRL


Pupil Exam: NORMAL ACCOMODATION, PERRL





- ENT Exam


ENT Exam: Mucous Membranes Moist, Normal Exam





- Neck Exam


Neck exam: Positive for: Normal Inspection





- Respiratory Exam


Respiratory Exam: Clear to Auscultation Bilateral, NORMAL BREATHING PATTERN





- Cardiovascular Exam


Cardiovascular Exam: REGULAR RHYTHM, +S1, +S2





- GI/Abdominal Exam


GI & Abdominal Exam: Normal Bowel Sounds, Soft.  absent: Tenderness





- Extremities Exam


Extremities exam: Positive for: normal inspection





- Back Exam


Back exam: NORMAL INSPECTION





- Neurological Exam


Neurological exam: Abnormal Gait, CN II-XII Intact, Oriented x3, Reflexes Normal


Additional comments: 





Generalized weakness, mostly in the lower extremities.  Strength in upper 

extremities is 3/5 proximally and 2/5 distally.  Strength in the lower 

extremities is 2/5 bilaterally proximally and distally.   





- Psychiatric Exam


Psychiatric exam: Normal Affect, Normal Mood





- Skin


Skin Exam: Dry, Intact, Normal Color, Warm





Results





- Vital Signs


Recent Vital Signs: 


                                Last Vital Signs











Temp  100.0 F H  02/03/19 16:03


 


Pulse  87   02/03/19 16:04


 


Resp  16   02/03/19 16:03


 


BP  128/67   02/03/19 16:03


 


Pulse Ox  95   02/03/19 16:03














- Labs


Result Diagrams: 


                                 02/03/19 06:10





                                 02/03/19 06:10


Labs: 


                         Laboratory Results - last 24 hr











  02/02/19 02/02/19 02/02/19





  17:36 18:00 18:05


 


WBC    7.6


 


RBC    4.71


 


Hgb    12.9


 


Hct    40.5


 


MCV    85.9  D


 


MCH    27.4


 


MCHC    31.9 L


 


RDW    15.0 H


 


Plt Count    207


 


MPV    8.1


 


Neut % (Auto)    90.3 H


 


Lymph % (Auto)    2.4 L


 


Mono % (Auto)    6.9


 


Eos % (Auto)    0.0


 


Baso % (Auto)    0.4


 


Neut # (Auto)    6.8


 


Lymph # (Auto)    0.2 L


 


Mono # (Auto)    0.5


 


Eos # (Auto)    0.0


 


Baso # (Auto)    0.0


 


Neutrophils % (Manual)    92 H


 


Lymphocytes % (Manual)    4 L


 


Monocytes % (Manual)    3


 


Basophils % (Manual)    1


 


Platelet Estimate    Normal


 


pO2   38 


 


VBG pH   7.40 


 


VBG pCO2   47 


 


VBG HCO3   27.0 


 


VBG Total CO2   30.5 H 


 


VBG O2 Sat (Calc)   77.0 H 


 


VBG Base Excess   3.5 H 


 


VBG Potassium   4.0 


 


Sodium   134.0 


 


Chloride   102.0 


 


Glucose   156 H 


 


Lactate   1.3 


 


FiO2   21.0 


 


Potassium   


 


Carbon Dioxide   


 


Anion Gap   


 


BUN   


 


Creatinine   


 


Est GFR ( Amer)   


 


Est GFR (Non-Af Amer)   


 


POC Glucose (mg/dL)  141 H  


 


Random Glucose   


 


Calcium   


 


Total Bilirubin   


 


AST   


 


ALT   


 


Alkaline Phosphatase   


 


Lactate Dehydrogenase   


 


Total Creatine Kinase   


 


Troponin I   


 


Total Protein   


 


Albumin   


 


Globulin   


 


Albumin/Globulin Ratio   


 


Venous Blood Potassium   4.0 


 


Influenza Typ A,B (EIA)   














  02/02/19 02/02/19 02/02/19





  18:05 18:21 18:30


 


WBC   


 


RBC   


 


Hgb   


 


Hct   


 


MCV   


 


MCH   


 


MCHC   


 


RDW   


 


Plt Count   


 


MPV   


 


Neut % (Auto)   


 


Lymph % (Auto)   


 


Mono % (Auto)   


 


Eos % (Auto)   


 


Baso % (Auto)   


 


Neut # (Auto)   


 


Lymph # (Auto)   


 


Mono # (Auto)   


 


Eos # (Auto)   


 


Baso # (Auto)   


 


Neutrophils % (Manual)   


 


Lymphocytes % (Manual)   


 


Monocytes % (Manual)   


 


Basophils % (Manual)   


 


Platelet Estimate   


 


pO2   


 


VBG pH   


 


VBG pCO2   


 


VBG HCO3   


 


VBG Total CO2   


 


VBG O2 Sat (Calc)   


 


VBG Base Excess   


 


VBG Potassium   


 


Sodium  137  


 


Chloride  95 L  


 


Glucose   


 


Lactate   


 


FiO2   


 


Potassium  4.3  


 


Carbon Dioxide  30  


 


Anion Gap  16  


 


BUN  17  


 


Creatinine  0.7  


 


Est GFR ( Amer)  > 60  


 


Est GFR (Non-Af Amer)  > 60  


 


POC Glucose (mg/dL)   


 


Random Glucose  153 H  


 


Calcium  9.5  


 


Total Bilirubin  0.8  


 


AST  27  


 


ALT  28  


 


Alkaline Phosphatase  124  


 


Lactate Dehydrogenase   


 


Total Creatine Kinase    212 H


 


Troponin I  0.0410  


 


Total Protein  7.0  


 


Albumin  3.9  


 


Globulin  3.1  


 


Albumin/Globulin Ratio  1.3  


 


Venous Blood Potassium   


 


Influenza Typ A,B (EIA)   Negative for flu a/b 














  02/03/19 02/03/19 02/03/19





  01:38 06:10 06:10


 


WBC    5.4


 


RBC    4.37


 


Hgb    12.2


 


Hct    37.6


 


MCV    86.0


 


MCH    27.9


 


MCHC    32.4 L


 


RDW    15.6 H


 


Plt Count    167


 


MPV    7.9


 


Neut % (Auto)    84.9 H


 


Lymph % (Auto)    5.6 L


 


Mono % (Auto)    8.9


 


Eos % (Auto)    0.2


 


Baso % (Auto)    0.4


 


Neut # (Auto)    4.6


 


Lymph # (Auto)    0.3 L


 


Mono # (Auto)    0.5


 


Eos # (Auto)    0.0


 


Baso # (Auto)    0.0


 


Neutrophils % (Manual)   


 


Lymphocytes % (Manual)   


 


Monocytes % (Manual)   


 


Basophils % (Manual)   


 


Platelet Estimate   


 


pO2   


 


VBG pH   


 


VBG pCO2   


 


VBG HCO3   


 


VBG Total CO2   


 


VBG O2 Sat (Calc)   


 


VBG Base Excess   


 


VBG Potassium   


 


Sodium   140 


 


Chloride   96 L 


 


Glucose   


 


Lactate   


 


FiO2   


 


Potassium   3.7 


 


Carbon Dioxide   28 


 


Anion Gap   20 


 


BUN   14 


 


Creatinine   0.6 L 


 


Est GFR ( Amer)   > 60 


 


Est GFR (Non-Af Amer)   > 60 


 


POC Glucose (mg/dL)   


 


Random Glucose   109 H 


 


Calcium   8.6 


 


Total Bilirubin   


 


AST   


 


ALT   


 


Alkaline Phosphatase   


 


Lactate Dehydrogenase   


 


Total Creatine Kinase   302 H 


 


Troponin I  0.0510  0.0460 


 


Total Protein   


 


Albumin   


 


Globulin   


 


Albumin/Globulin Ratio   


 


Venous Blood Potassium   


 


Influenza Typ A,B (EIA)   














  02/03/19 02/03/19





  09:42 11:53


 


WBC  


 


RBC  


 


Hgb  


 


Hct  


 


MCV  


 


MCH  


 


MCHC  


 


RDW  


 


Plt Count  


 


MPV  


 


Neut % (Auto)  


 


Lymph % (Auto)  


 


Mono % (Auto)  


 


Eos % (Auto)  


 


Baso % (Auto)  


 


Neut # (Auto)  


 


Lymph # (Auto)  


 


Mono # (Auto)  


 


Eos # (Auto)  


 


Baso # (Auto)  


 


Neutrophils % (Manual)  


 


Lymphocytes % (Manual)  


 


Monocytes % (Manual)  


 


Basophils % (Manual)  


 


Platelet Estimate  


 


pO2  


 


VBG pH  


 


VBG pCO2  


 


VBG HCO3  


 


VBG Total CO2  


 


VBG O2 Sat (Calc)  


 


VBG Base Excess  


 


VBG Potassium  


 


Sodium  


 


Chloride  


 


Glucose  


 


Lactate  


 


FiO2  


 


Potassium  


 


Carbon Dioxide  


 


Anion Gap  


 


BUN  


 


Creatinine  


 


Est GFR ( Amer)  


 


Est GFR (Non-Af Amer)  


 


POC Glucose (mg/dL)  


 


Random Glucose  


 


Calcium  


 


Total Bilirubin  


 


AST  


 


ALT  


 


Alkaline Phosphatase  


 


Lactate Dehydrogenase   510


 


Total Creatine Kinase  


 


Troponin I  


 


Total Protein  


 


Albumin  


 


Globulin  


 


Albumin/Globulin Ratio  


 


Venous Blood Potassium  


 


Influenza Typ A,B (EIA)  Negative for flu a/b 














Assessment & Plan


(1) Altered mental state


Assessment and Plan: 


This may be due to hypoxia, generalized cerebral ischemia, or could be due to 

underlying infection.  I recommend obtaining a CTA of the head/neck for further 

evaluation to determine if there is evidence of hemodynamically significant s

tenosis in the carotids or vertebral system.  Continue fluids and current 

medications per the primary team.





Thank you for this consultation. 


Status: Acute

## 2019-02-03 NOTE — CARD
--------------- APPROVED REPORT --------------





Date of service: 02/02/2019



EKG Measurement

Heart [id number]

[id number]

[id number]

[id number]

[id number]



<Conclusion>

Normal sinus rhythm with sinus arrhythmia

Septal infarct, age undetermined

Nonspecific ST & T wave abnormality

Abnormal ECG

## 2019-02-03 NOTE — CT
Date of service: 02/02/2019



PROCEDURE:  CT HEAD WITHOUT CONTRAST.



HISTORY:

r/o bleed



COMPARISON:

None available



TECHNIQUE:

Axial computed tomography images were obtained through the head/brain 

without intravenous contrast.  



Radiation dose:



Total exam DLP = 860.4 mGy-cm.



This CT exam was performed using one or more of the following dose 

reduction techniques: Automated exposure control, adjustment of the 

mA and/or kV according to patient size, and/or use of iterative 

reconstruction technique.



FINDINGS:



HEMORRHAGE:

No intracranial hemorrhage. 



BRAIN:

Diffuse atrophy with prominence of the ventricles and sulci noted. 



No mass effect or edema. Intracranial atherosclerosis. 



Scattered periventricular and subcortical white matter hypodensities, 

which are nonspecific, but often seen with chronic microvascular 

ischemic disease. 



Please note that MRI with diffusion imaging is more sensitive in the 

detection of acute ischemic event.



VENTRICLES:

No hydrocephalus. 



CALVARIUM:

Unremarkable.



PARANASAL SINUSES:

Unremarkable as visualized. No significant inflammatory changes.



MASTOID AIR CELLS:

Unremarkable as visualized. No inflammatory changes.



OTHER FINDINGS:

Partial opacification of the external auditory canals, likely cerumen.



IMPRESSION:

Generalized atrophy. 



Nonspecific white matter changes. 



Preliminary impression was provided by [facility].

## 2019-02-04 LAB
ALDOLASE SERPL-CCNC: 5.7 U/L (ref ?–8.1)
ARTERIAL BLOOD GAS O2 SAT: 90.3 % (ref 95–98)
ARTERIAL BLOOD GAS PCO2: 50 MM/HG (ref 35–45)
ARTERIAL BLOOD GAS TCO2: 30.4 MMOL/L (ref 22–28)
ARTERIAL PATENCY WRIST A: YES
BILIRUB UR-MCNC: NEGATIVE MG/DL
COLOR UR: YELLOW
GLUCOSE UR STRIP-MCNC: (no result) MG/DL
HCO3 BLDA-SCNC: 26.7 MMOL/L (ref 21–28)
HDLC SERPL-MCNC: 26 MG/DL (ref 30–70)
INHALED O2 CONCENTRATION: 36 %
LDLC SERPL-MCNC: 67 MG/DL (ref 0–129)
LEUKOCYTE ESTERASE UR-ACNC: (no result) LEU/UL
PH BLDA: 7.37 [PH] (ref 7.35–7.45)
PH UR STRIP: 5 [PH] (ref 5–8)
PO2 BLDA: 52 MM/HG (ref 80–100)
PROT UR STRIP-MCNC: NEGATIVE MG/DL
RBC # UR STRIP: NEGATIVE /UL
SP GR UR STRIP: 1.03 (ref 1–1.03)
SQUAMOUS EPITHIAL: 2 /HPF (ref 0–5)
URINE CLARITY: (no result)
UROBILINOGEN UR-MCNC: (no result) MG/DL (ref 0.2–1)

## 2019-02-04 PROCEDURE — 3E0F7GC INTRODUCTION OF OTHER THERAPEUTIC SUBSTANCE INTO RESPIRATORY TRACT, VIA NATURAL OR ARTIFICIAL OPENING: ICD-10-PCS | Performed by: INTERNAL MEDICINE

## 2019-02-04 RX ADMIN — IPRATROPIUM BROMIDE AND ALBUTEROL SULFATE SCH: .5; 3 SOLUTION RESPIRATORY (INHALATION) at 14:17

## 2019-02-04 RX ADMIN — IPRATROPIUM BROMIDE SCH MG: 0.5 SOLUTION RESPIRATORY (INHALATION) at 19:31

## 2019-02-04 RX ADMIN — IPRATROPIUM BROMIDE SCH MG: 0.5 SOLUTION RESPIRATORY (INHALATION) at 15:29

## 2019-02-04 RX ADMIN — IPRATROPIUM BROMIDE SCH MG: 0.5 SOLUTION RESPIRATORY (INHALATION) at 23:48

## 2019-02-04 RX ADMIN — Medication SCH CAP: at 17:10

## 2019-02-04 RX ADMIN — STANDARDIZED SENNA CONCENTRATE AND DOCUSATE SODIUM SCH TAB: 8.6; 5 TABLET, FILM COATED ORAL at 21:17

## 2019-02-04 RX ADMIN — PANTOPRAZOLE SODIUM SCH MG: 40 TABLET, DELAYED RELEASE ORAL at 12:48

## 2019-02-04 RX ADMIN — IPRATROPIUM BROMIDE AND ALBUTEROL SULFATE SCH ML: .5; 3 SOLUTION RESPIRATORY (INHALATION) at 07:41

## 2019-02-04 RX ADMIN — CLINDAMYCIN IN 5 PERCENT DEXTROSE SCH MLS/HR: 12 INJECTION, SOLUTION INTRAVENOUS at 17:15

## 2019-02-04 NOTE — CARD
--------------- APPROVED REPORT --------------





Date of service: 02/04/2019



EKG Measurement

Heart [id number]

[id number]

[id number]

[id number]



<Conclusion>

Atrial fibrillation with rapid ventricular response

Nonspecific ST abnormality

Abnormal ECG

## 2019-02-04 NOTE — CP.PCM.PN
Subjective





- Date & Time of Evaluation


Date of Evaluation: 02/04/19


Time of Evaluation: 17:04





- Subjective


Subjective: 





Neuro Follow-Up Note:





Mrs. Charles was evaluated this afternoon at bedside.  She offers no complaints and

admits to feeling better compared to a few days ago.  She admits to an 

occasional cough.  Denies h/a, dizziness, visual changes, chest pain, 

palpitations, sob, abd pain, n/v/d.





Objective





- Vital Signs/Intake and Output


Vital Signs (last 24 hours): 


                                        











Temp Pulse Resp BP Pulse Ox


 


 97.1 F L  111 H  22   109/62   96 


 


 02/04/19 16:32  02/04/19 16:32  02/04/19 16:32  02/04/19 16:32  02/04/19 16:32











- Medications


Medications: 


                               Current Medications





Amiodarone HCl (Cordarone)  200 mg PO BID [facility]


Aspirin (Ecotrin)  81 mg PO DAILY [facility]


   Last Admin: 02/04/19 12:42 Dose:  81 mg


Bisoprolol Fumarate (Zebeta)  5 mg PO DAILY [facility]


   Last Admin: 02/04/19 13:06 Dose:  Not Given


Clopidogrel Bisulfate (Plavix)  75 mg PO DAILY [facility]


   Last Admin: 02/04/19 12:42 Dose:  75 mg


Diltiazem HCl (Cardizem)  30 mg PO BID [facility]


Enoxaparin Sodium (Lovenox)  90 mg SC Q12 [facility]; Protocol


   Last Admin: 02/04/19 12:39 Dose:  90 mg


Hydrocortisone Sodium Succinate (Solu-Cortef)  100 mg IV DAILY [facility]


Azithromycin 500 mg/ Sodium (Chloride)  250 mls @ 250 mls/hr IVPB DAILY [facility]; 

Protocol


   Last Admin: 02/04/19 12:43 Dose:  250 mls/hr


Diltiazem HCl 125 mg/ Sodium (Chloride)  125 mls @ 5 mls/hr IV .Q24H [facility]; 

Protocol


   Stop: 02/05/19 10:27


   Last Admin: 02/04/19 13:12 Dose:  5 mg/hr, 5 mls/hr


Clindamycin Phosphate (Cleocin)  600 mg in 50 mls @ 50 mls/hr IVPB Q8 [facility]; 

Protocol


Ipratropium Bromide (Atrovent)  0.5 mg IH RQ4 [facility]


   Last Admin: 02/04/19 15:29 Dose:  0.5 mg


Isosorbide Mononitrate (Imdur Er)  30 mg PO DAILY [facility]


   Last Admin: 02/04/19 12:38 Dose:  30 mg


Lactobacillus Acidophilus (Bacid Acidophilus)  1 cap PO BID [facility]


Lisinopril (Zestril)  2.5 mg PO DAILY [facility]


   Last Admin: 02/04/19 13:05 Dose:  2.5 mg


Pantoprazole Sodium (Protonix Ec Tab)  40 mg PO DAILY [facility]


   Last Admin: 02/04/19 12:48 Dose:  40 mg


Senna/Docusate Sodium (Senokot S 50 Mg-8.6 Mg)  2 tab PO HS [facility]


   Last Admin: 02/03/19 21:33 Dose:  2 tab











- Labs


Labs: 


                                        





                                 02/03/19 06:10 





                                 02/03/19 06:10 











- Constitutional


Appears: Well, Non-toxic, No Acute Distress





- Head Exam


Head Exam: ATRAUMATIC, NORMAL INSPECTION, NORMOCEPHALIC





- Eye Exam


Eye Exam: EOMI, Normal appearance


Pupil Exam: NORMAL ACCOMODATION, PERRL





- ENT Exam


ENT Exam: Mucous Membranes Moist





- Neck Exam


Neck Exam: Normal Inspection





- Respiratory Exam


Respiratory Exam: NORMAL BREATHING PATTERN





- Extremities Exam


Additional comments: 





Able to move all extremities with generalized weakness





- Neurological Exam


Neurological Exam: Alert, Altered, CN II-XII Intact, Reflexes Normal


Neuro motor strength exam: Left Upper Extremity: 4 ( 3/5), Right Upper 

Extremity: 4 ( 3/5), Left Lower Extremity: 3 (plantar flexion 2-3/5), Right 

Lower Extremity: 3 (plantar flexion 2-3/5)


Additional comments: 





Awake, alert; confused and forgetful.


Knows she is in the hospital.


Speech clear, fluid


Moves all extremities, generalized weakness more to the lower extremities.


No tremors








- Psychiatric Exam


Psychiatric exam: Normal Affect, Normal Mood





- Skin


Skin Exam: Normal Color





Assessment and Plan


(1) Altered mental state


Assessment & Plan: 


Imaging reviewed:  





-CTA Head and Neck: unremarkable to occlusion or stenosis


-CT Head: no acute intracranial involvement


-ECHO done, pending results.





-Likely 2/2 infectious process---pt does have right lower lobe infiltrate on 

Chest CT.


-Continue current treatment and antibiotics per primary team.


-Notify neuro of acute changes in pt's condition.





Discussed with Dr. Flores


Status: Acute

## 2019-02-04 NOTE — CARD
--------------- APPROVED REPORT --------------





Date of service: 02/04/2019



EXAM: Two-dimensional and M-mode echocardiogram with Doppler and 

color Doppler.



Other Information 

Quality : AverageRhythm : Tachycardia



INDICATION

Cardiac Disease: CAD Syncope COPD 



Surgery/Intervention

Status/Post Intervention: Stent



2D DIMENSIONS 

IVSd1.28   (0.7-1.1cm)LVDd3.83   (3.9-5.9cm)

PWd1.34   (0.7-1.1cm)IVSs1.58   (0.8-1.2cm)

LVDs2.93   (2.5-4.0cm)FS (%) 23.4   %

PWs1.56   (0.8-1.2cm)



M-Mode DIMENSIONS 

Left Atrium (MM)4.76   (2.5-4.0cm)IVSd1.36   (0.7-1.1cm)

Aortic Root2.94   (2.2-3.7cm)LVDd4.14   (4.0-5.6cm)

Aortic Cusp Exc.1.92   (1.5-2.0cm)PWd1.09   (0.7-1.1cm)

IVSs1.79   cmFS (%) 19   %

LVDs3.34   (2.0-3.8cm)PWs1.22   cm



Mitral Valve

E/A ratio0.0



TDI

E/Lateral E'0.0E/Medial E'0.0



Tricuspid Valve

TR Peak [id number]/sRAP [id number] Peak Gr.27mmHg

[id number]



 LEFT VENTRICLE 

The left ventricle is normal size.

There is borderline to mild concentric left ventricular hypertrophy.

The left ventricular systolic function is normal.

The estimated ejection fraction is 50-55%

No regional wall motion abnormalities noted..

The left ventricular diastolic function cannot be assessed due to 

underlying atrial fibrillation. 

No left ventricle thrombus noted on this study.

There is no ventricular septal defect visualized.

There is no left ventricular aneurysm.

There is no mass noted in the left ventricle.



 RIGHT VENTRICLE 

The right ventricle is normal size.

There is normal right ventricular wall thickness.

The right ventricular systolic function is normal.



 ATRIA 

The left atrium is mildly dilated.

The right atrium size is normal.

The interatrial septum is intact with no evidence for an atrial 

septal defect.



 AORTIC VALVE 

The aortic valve is normal in structure.

No aortic regurgitation is present.

There is no aortic valvular stenosis.

There is no aortic valvular vegetation.



 MITRAL VALVE 

The mitral valve is normal in structure.

There is no evidence of mitral valve prolapse.

There is no mitral valve stenosis.

There is trace mitral valve regurgitation noted.



 TRICUSPID VALVE 

The tricuspid valve is normal in structure.

There is mild tricuspid valve regurgitation noted. RVSP is calculated 

at 45 mm Hg. 

There is no tricuspid valve prolapse or vegetation.

There is no tricuspid valve stenosis.



 PULMONIC VALVE 

The pulmonary valve is normal in structure.

There is no pulmonic valvular regurgitation.

There is no pulmonic valvular stenosis.



 GREAT VESSELS 

The aortic root is normal in size.

The ascending aorta is normal in size.

The pulmonary artery is normal.

The IVC is dilated in size and collapses <50% with inspiration.



 PERICARDIAL EFFUSION 

There is no pericardial effusion.

There is no pleural effusion.



<Conclusion>

There is borderline to mild concentric left ventricular hypertrophy.

The estimated ejection fraction is 50-55%

The left ventricular diastolic function cannot be assessed due to 

underlying atrial fibrillation.

The left atrium is mildly dilated.

There is trace mitral valve regurgitation noted.

There is mild tricuspid valve regurgitation noted. RVSP is calculated 

at 45 mm Hg.

The IVC is dilated in size and collapses <50% with inspiration.

## 2019-02-04 NOTE — CP.PCM.PN
<Lashay Quintero - Last Filed: 02/04/19 13:54>





Subjective





- Date & Time of Evaluation


Date of Evaluation: 02/04/19


Time of Evaluation: 09:47





- Subjective


Subjective: 





Patient was seen and evaluated this AM at bedside. Patient was eating, sitting 

upright in bed receiving 3L of oxygen via NC. She stated she slept well last 

night. She denied any fever, chills, chest pain or shortness of breath.





Objective





- Vital Signs/Intake and Output


Vital Signs (last 24 hours): 


                                        











Temp Pulse Resp BP Pulse Ox


 


 98.4 F   80   18   142/80   93 L


 


 02/04/19 09:14  02/04/19 09:14  02/04/19 09:14  02/04/19 09:14  02/04/19 05:00











- Medications


Medications: 


                               Current Medications





Albuterol/Ipratropium (Duoneb 3 Mg/0.5 Mg (3 Ml) Ud)  3 ml INH RQID [facility]


   Last Admin: 02/04/19 07:41 Dose:  3 ml


Aspirin (Ecotrin)  81 mg PO DAILY [facility]


   Last Admin: 02/03/19 09:07 Dose:  81 mg


Bisoprolol Fumarate (Zebeta)  5 mg PO DAILY [facility]


   Last Admin: 02/03/19 09:07 Dose:  5 mg


Clopidogrel Bisulfate (Plavix)  75 mg PO DAILY [facility]


   Last Admin: 02/03/19 09:07 Dose:  75 mg


Diltiazem HCl (Cardizem)  30 mg PO BID [facility]


Enoxaparin Sodium (Lovenox)  90 mg SC Q12 [facility]; Protocol


Azithromycin 500 mg/ Sodium (Chloride)  250 mls @ 250 mls/hr IVPB DAILY [facility]; 

Protocol


   Last Admin: 02/03/19 12:04 Dose:  250 mls/hr


Diltiazem HCl 125 mg/ Sodium (Chloride)  125 mls @ 5 mls/hr IV .Q24H [facility]; 

Protocol


   Stop: 02/05/19 10:27


Isosorbide Mononitrate (Imdur Er)  30 mg PO DAILY [facility]


   Last Admin: 02/03/19 09:07 Dose:  30 mg


Lisinopril (Zestril)  2.5 mg PO DAILY [facility]


   Last Admin: 02/03/19 09:07 Dose:  2.5 mg


Pantoprazole Sodium (Protonix Ec Tab)  40 mg PO DAILY [facility]


Senna/Docusate Sodium (Senokot S 50 Mg-8.6 Mg)  2 tab PO HS [facility]


   Last Admin: 02/03/19 21:33 Dose:  2 tab











- Labs


Labs: 


                                        





                                 02/03/19 06:10 





                                 02/03/19 06:10 











- Constitutional


Appears: Non-toxic, No Acute Distress





- Head Exam


Head Exam: ATRAUMATIC, NORMAL INSPECTION





- Eye Exam


Eye Exam: EOMI





- ENT Exam


ENT Exam: Mucous Membranes Moist





- Respiratory Exam


Additional comments: 





scattered rhonchi but no wheeze





- Cardiovascular Exam


Cardiovascular Exam: REGULAR RHYTHM, +S1, +S2





- GI/Abdominal Exam


GI & Abdominal Exam: Soft, Normal Bowel Sounds.  absent: Guarding, Rigid, 

Tenderness





- Extremities Exam


Extremities Exam: absent: Calf Tenderness





- Neurological Exam


Neurological Exam: Alert, Awake, Oriented x3





- Psychiatric Exam


Psychiatric exam: Normal Affect, Normal Mood





- Skin


Skin Exam: Dry, Intact





Assessment and Plan





- Assessment and Plan (Free Text)


Assessment: 





87 y.o F with hx of COPD, mod. MR with pulmonary HTN (EF 50-55% as noted in echo

9/16/2017), CAD & paroxysmal atrial fibrillation.





1. Syncope (Acute )


-Possibly due to paroxysmal a fib given RRT earlier today for a fib with VRV of 

148 bpm.


-Chest CT negative for pulmonary embolism.


-Will continue therapeutic lovenox. 


-Head CT showed generalized atrophy, nonspecific white changes, no mass effect 

or edema (2/2/2019).


-Awaiting official reports of carotid doppler & echo done 2/2/2019. (Echo 

ordered but unable to be done today. FU tomorrow. Last echo in 9/16/2017 which 

showed EF of 50-55%, moderate mitral regurg/tricuspid regurg, severe pulmonary 

HTN). 





2. Paroxysmal Atrial fibrillation (VRV of 148 bpm this AM)


-Continue cardizem drip.


-FU with Cardiology, Dr. Collado.


-Awaiting official reports of carotid doppler & echo done 2/2/2019.


-Continue therapeutic dose of Lovenox. 





3. Cough with hx of COPD (Acute on chronic)


-2/2/2019 CXR showed medical right lower lobe atelectasis/infiltrate as per Dr. Jama. 


-Will fu with  Dr. Pyle. 


-Continue Azithromycin 500mg IV QD day #2 


- Clindamyin 600mg IV Q8  day #1. 


-Continue supplemental ox.


-WBC decreased to 5.4 (2/3/2019) from 7.6 (2/2/2019)


-Continue duonebs.





4. hx of stent placement


-Continue aspirin 81 mg PO & plavix 75 mg PO. 





5. Diet


Heart Healthy diet





6. GI prophylaxis


-Continue Protonix 40mg QD





7. DVT prophylaxis


(on theraupetic lovenox)





<Ant Booker D - Last Filed: 02/04/19 14:36>





Objective





- Vital Signs/Intake and Output


Vital Signs (last 24 hours): 


                                        











Temp Pulse Resp BP Pulse Ox


 


 97.9 F   79   20   120/79   93 L


 


 02/04/19 13:00  02/04/19 13:05  02/04/19 13:00  02/04/19 13:05  02/04/19 13:00











- Medications


Medications: 


                               Current Medications





Amiodarone HCl (Cordarone)  200 mg PO BID [facility]


Aspirin (Ecotrin)  81 mg PO DAILY [facility]


   Last Admin: 02/04/19 12:42 Dose:  81 mg


Bisoprolol Fumarate (Zebeta)  5 mg PO DAILY [facility]


   Last Admin: 02/04/19 13:06 Dose:  Not Given


Clopidogrel Bisulfate (Plavix)  75 mg PO DAILY [facility]


   Last Admin: 02/04/19 12:42 Dose:  75 mg


Diltiazem HCl (Cardizem)  30 mg PO BID [facility]


Enoxaparin Sodium (Lovenox)  90 mg SC Q12 [facility]; Protocol


   Last Admin: 02/04/19 12:39 Dose:  90 mg


Hydrocortisone Sodium Succinate (Solu-Cortef)  100 mg IV DAILY [facility]


Azithromycin 500 mg/ Sodium (Chloride)  250 mls @ 250 mls/hr IVPB DAILY [facility]; 

Protocol


   Last Admin: 02/04/19 12:43 Dose:  250 mls/hr


Diltiazem HCl 125 mg/ Sodium (Chloride)  125 mls @ 5 mls/hr IV .Q24H [facility]; 

Protocol


   Stop: 02/05/19 10:27


   Last Admin: 02/04/19 13:12 Dose:  5 mg/hr, 5 mls/hr


Clindamycin Phosphate (Cleocin)  600 mg in 50 mls @ 50 mls/hr IVPB Q8 [facility]; 

Protocol


Ipratropium Bromide (Atrovent)  0.5 mg IH RQ4 [facility]


Isosorbide Mononitrate (Imdur Er)  30 mg PO DAILY [facility]


   Last Admin: 02/04/19 12:38 Dose:  30 mg


Lactobacillus Acidophilus (Bacid Acidophilus)  1 cap PO BID [facility]


Lisinopril (Zestril)  2.5 mg PO DAILY [facility]


   Last Admin: 02/04/19 13:05 Dose:  2.5 mg


Pantoprazole Sodium (Protonix Ec Tab)  40 mg PO DAILY [facility]


   Last Admin: 02/04/19 12:48 Dose:  40 mg


Senna/Docusate Sodium (Senokot S 50 Mg-8.6 Mg)  2 tab PO HS [facility]


   Last Admin: 02/03/19 21:33 Dose:  2 tab











- Labs


Labs: 


                                        





                                 02/03/19 06:10 





                                 02/03/19 06:10 











Attending/Attestation





- Attestation


I have personally seen and examined this patient.: Yes


I have fully participated in the care of the patient.: Yes


I have reviewed all pertinent clinical information, including history, physical 

exam and plan: Yes


Notes (Text): 





02/04/19 14:36


Patient seen and examined with resident. Case discussed and agreed with 

assessment and plan.

## 2019-02-04 NOTE — CP.PCM.CON
History of Present Illness





- History of Present Illness


History of Present Illness: 





88 yo female admitted with syncope, disorientation, nonproductive cough. PMH 

COPD, CAD s/p stent/Mi, PAF pt went into rapid atrial fibrillation and started 

on Diltiazem gtt. Pe w/u CT scan pending. No distress, no c/o cp or baseline so

b.





Past Patient History





- Infectious Disease


Hx of Infectious Diseases: None





- Tetanus Immunizations


Tetanus Immunization: Unknown





- Past Medical History & Family History


Past Medical History?: Yes





- Past Social History


Smoking Status: Never Smoked





- CARDIAC


Hx Atrial Fibrillation: Yes


Hx Congestive Heart Failure: Yes


Hx Hypercholesterolemia: Yes


Hx Hypertension: Yes


Hx Pacemaker: No


Hx Peripheral Edema: Yes





- PULMONARY


Hx Chronic Obstructive Pulmonary Disease (COPD): Yes


Hx Emphysema: Yes


Hx Pneumonia: Yes





- NEUROLOGICAL


Hx Paralysis: No





- HEENT


Hx HEENT Problems: No





- RENAL


Hx Chronic Kidney Disease: No





- ENDOCRINE/METABOLIC


Hx Endocrine Disorders: No





- HEMATOLOGICAL/ONCOLOGICAL


Hx Human Immunodeficiency Virus (HIV): No





- INTEGUMENTARY


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





- MUSCULOSKELETAL/RHEUMATOLOGICAL


Hx Falls: Yes


Hx Fractures: Yes (right ankle)





- GASTROINTESTINAL


Hx Gastrointestinal Disorders: No





- GENITOURINARY/GYNECOLOGICAL


Hx Genitourinary Disorders: Yes


Hx Incontinence: Yes


Other/Comment: Stress Inc





- PSYCHIATRIC


Hx Psychophysiologic Disorder: No


Hx Substance Use: No





- SURGICAL HISTORY


Hx Appendectomy: No


Hx Coronary Stent: Yes (x 2)





- ANESTHESIA


Hx Anesthesia: Yes


Hx Anesthesia Reactions: No


Hx Malignant Hyperthermia: No


Has any member of the family had a problem w/ anesthesia?: No





Meds


Allergies/Adverse Reactions: 


                                    Allergies











Allergy/AdvReac Type Severity Reaction Status Date / Time


 


Penicillins Allergy Mild SWELLING Verified 02/02/19 17:25














- Medications


Medications: 


                               Current Medications





Albuterol/Ipratropium (Duoneb 3 Mg/0.5 Mg (3 Ml) Ud)  3 ml INH RQID [facility]


   Last Admin: 02/04/19 07:41 Dose:  3 ml


Aspirin (Ecotrin)  81 mg PO DAILY [facility]


   Last Admin: 02/04/19 12:42 Dose:  81 mg


Bisoprolol Fumarate (Zebeta)  5 mg PO DAILY [facility]


   Last Admin: 02/03/19 09:07 Dose:  5 mg


Clopidogrel Bisulfate (Plavix)  75 mg PO DAILY [facility]


   Last Admin: 02/04/19 12:42 Dose:  75 mg


Diltiazem HCl (Cardizem)  30 mg PO BID [facility]


Enoxaparin Sodium (Lovenox)  90 mg SC Q12 [facility]; Protocol


   Last Admin: 02/04/19 12:39 Dose:  90 mg


Azithromycin 500 mg/ Sodium (Chloride)  250 mls @ 250 mls/hr IVPB DAILY [facility]; 

Protocol


   Last Admin: 02/04/19 12:43 Dose:  250 mls/hr


Diltiazem HCl 125 mg/ Sodium (Chloride)  125 mls @ 5 mls/hr IV .Q24H [facility]; 

Protocol


   Stop: 02/05/19 10:27


Clindamycin Phosphate 600 mg/ (Sodium Chloride)  54 mls @ 54 mls/hr IVPB Q8 [facility];

Protocol


Isosorbide Mononitrate (Imdur Er)  30 mg PO DAILY [facility]


   Last Admin: 02/04/19 12:38 Dose:  30 mg


Lactobacillus Acidophilus (Bacid Acidophilus)  1 cap PO BID [facility]


Lisinopril (Zestril)  2.5 mg PO DAILY [facility]


   Last Admin: 02/03/19 09:07 Dose:  2.5 mg


Pantoprazole Sodium (Protonix Ec Tab)  40 mg PO DAILY [facility]


   Last Admin: 02/04/19 12:48 Dose:  40 mg


Senna/Docusate Sodium (Senokot S 50 Mg-8.6 Mg)  2 tab PO HS [facility]


   Last Admin: 02/03/19 21:33 Dose:  2 tab











Physical Exam





- Neck Exam


Neck exam: Positive for: Normal Inspection





- Respiratory Exam


Respiratory Exam: Wheezes





- Cardiovascular Exam


Cardiovascular Exam: Irregular Rhythm





- GI/Abdominal Exam


GI & Abdominal Exam: Normal Bowel Sounds





- Back Exam


Back exam: NORMAL INSPECTION





Results





- Vital Signs


Recent Vital Signs: 


                                Last Vital Signs











Temp  98.4 F   02/04/19 09:14


 


Pulse  121 H  02/04/19 12:35


 


Resp  20   02/04/19 12:35


 


BP  127/68   02/04/19 12:35


 


Pulse Ox  93 L  02/04/19 12:35














- Labs


Result Diagrams: 


                                 02/03/19 06:10





                                 02/03/19 06:10


Labs: 


                         Laboratory Results - last 24 hr











  02/03/19 02/03/19 02/04/19





  10:09 11:53 05:20


 


pCO2   


 


pO2   


 


HCO3   


 


ABG pH   


 


ABG Total CO2   


 


ABG O2 Saturation   


 


ABG Base Excess   


 


Chito Test   


 


ABG Potassium   


 


A-a O2 Difference   


 


Sodium   


 


Chloride   


 


Glucose   


 


Lactate   


 


FiO2   


 


C-React Prot High Sens   > 15.00 H 


 


Triglycerides    88


 


Cholesterol    117


 


LDL Cholesterol Direct    67


 


HDL Cholesterol    26 L


 


Arterial Blood Potassium   


 


Urine Color  Yellow  


 


Urine Clarity  Slighty-cloudy  


 


Urine pH  5.0  


 


Ur Specific Gravity  1.027  


 


Urine Protein  Negative  


 


Urine Glucose (UA)  Neg  


 


Urine Ketones  Negative  


 


Urine Blood  Negative  


 


Urine Nitrate  Negative  


 


Urine Bilirubin  Negative  


 


Urine Urobilinogen  0.2-1.0  


 


Ur Leukocyte Esterase  Neg  


 


Urine RBC (Auto)  < 1  


 


Urine Microscopic WBC  1  


 


Ur Squamous Epith Cells  2  














  02/04/19





  10:25


 


pCO2  50 H


 


pO2  52 L


 


HCO3  26.7


 


ABG pH  7.37


 


ABG Total CO2  30.4 H


 


ABG O2 Saturation  90.3 L


 


ABG Base Excess  2.7


 


Chito Test  Yes


 


ABG Potassium  3.0 L


 


A-a O2 Difference  142.0


 


Sodium  134.0


 


Chloride  104.0


 


Glucose  157 H


 


Lactate  0.9


 


FiO2  36.0


 


C-React Prot High Sens 


 


Triglycerides 


 


Cholesterol 


 


LDL Cholesterol Direct 


 


HDL Cholesterol 


 


Arterial Blood Potassium  3.0 L


 


Urine Color 


 


Urine Clarity 


 


Urine pH 


 


Ur Specific Gravity 


 


Urine Protein 


 


Urine Glucose (UA) 


 


Urine Ketones 


 


Urine Blood 


 


Urine Nitrate 


 


Urine Bilirubin 


 


Urine Urobilinogen 


 


Ur Leukocyte Esterase 


 


Urine RBC (Auto) 


 


Urine Microscopic WBC 


 


Ur Squamous Epith Cells 














Assessment & Plan





- Assessment and Plan (Free Text)


Assessment: 





87 year old female with episode of paroxysmal atrial fibrillation in setting of 

dehydration, disorientation, syncope, nonproductive cough. Started on Cardiazem 

gtt currently -115.





Will give PO Amiodarone to help convert to NSR , atrial fibrillation is acute


Will order Tfts

## 2019-02-04 NOTE — US
Date of service: 



02/03/2019



PROCEDURE:  Duplex ultrasound  of the carotid and vertebral arteries. 



HISTORY:

syncope and transient altered mental status



COMPARISON:

None available. 



TECHNIQUE:

Grayscale and duplex Doppler evaluation of the cervical carotid and 

vertebral arteries were performed. The common carotid, carotid 

bifurcations and cervical ICA and proximal ECA were evaluated.  The 

vertebral arteries were evaluated for gross patency and direction. 



FINDINGS:



RIGHT CAROTID ARTERIES:

Common Carotid Artery: Mild atherosclerotic disease noted.  Maximal 

flow velocity of 65.1 cm/s.



Carotid Bifurcation: Mild atherosclerotic disease and small calcified 

plaques.



Internal Carotid Artery:Mild atherosclerotic disease and intimal 

thickening noted.  The right internal carotid artery is tortuous.  

Maximal flow velocity of 54.1 cm/s.



External Carotid Artery (proximal branches): Normal. Maximal flow 

velocity of 94.8 cm/s.



ICA/CCA Ratio: 1.2



LEFT CAROTID ARTERIES:

Common Carotid Artery: Mild atherosclerotic disease.  Maximal flow 

velocity of 98.4 cm/s.



Carotid Bifurcation: Mild atherosclerotic disease.



Internal Carotid Artery:Tortuous internal carotid artery maximal flow 

velocity of 62.7 cm/s.



External Carotid Artery (proximal branches): Normal. Maximal flow 

velocity of 119.0 cm/s.



ICA/CCA Ratio: 1



VERTEBRAL ARTERIES:

Right Vertebral Artery: Patent. Antegrade flow.



Left Vertebral Artery: Patent. Antegrade flow.



OTHER FINDINGS:

No atherosclerotic calcification present



IMPRESSION:

Mild atherosclerotic disease.  Tortuous internal carotid arteries.



No ultrasound Doppler evidence of hemodynamically significant 

stenosis in the common and internal carotid arteries.

## 2019-02-04 NOTE — CT
Date of service: 



02/04/2019



PROCEDURE:  CT Chest with contrast (Pulmonary Angiogram)



HISTORY:

sob, tachycardia



COMPARISON:

None available.



TECHNIQUE:

Axial computed tomography images were obtained of the chest in the 

pulmonary arterial phase of enhancement. Coronal and sagittal 

reformatted images were created and reviewed.



Intravenous contrast dose: 



Radiation dose:



Total exam DLP = 2167.07 mGy-cm.



This CT exam was performed using one or more of the following dose 

reduction techniques: Automated exposure control, adjustment of the 

mA and/or kV according to patient size, and/or use of iterative 

reconstruction technique.



FINDINGS:



PULMONARY ARTERIES:

Unremarkable. No pulmonary embolism. 



AORTA:

No acute findings. No thoracic aortic aneurysm. No aortic 

atherosclerotic calcification or mural plaque present.



LUNGS:

Centrilobular emphysema with a small to moderate size right lower 

lobe infiltrate with consolidation. 



PLEURAL SPACES:

Unremarkable. No effusion or pneumothorax. 



HEART:

Unremarkable. No cardiomegaly. No significant pericardial effusion. 



LYMPH NODES:

No lymphadenopathy.



BONES, CHEST WALL:

Unremarkable. No fracture or destructive lesion 



OTHER FINDINGS:

2 centimeter left thyroid lobe nodule.  Cholelithiasis. 



IMPRESSION:

No pulmonary embolism.  Right lower lobe infiltrate.  Cholelithiasis. 

 Thyroid nodularity; correlate with thyroid ultrasound if clinically 

indicated.

## 2019-02-04 NOTE — CP.PCM.CON
History of Present Illness





- History of Present Illness


History of Present Illness: 





This 87-year-old female, former heavy cigarette smoker with COPD, presented to 

the emergency room in the evening of February 2 after being found on the floor 

by her family members.  Initially the patient declined coming to the hospital 

but ultimately she was convinced and presented to the emergency room by 

ambulance.  According to her family members she has been noted to be 

increasingly confused and weak with difficulty walking.  She has also had 

increased coughing without the ability to expectorate sputum.  She denies any 

fever or chills or chest pain.  Whether she had fallen from the bed is unclear 

but she was unable to get up off the floor until helped by family members.  When

found by her family she was somewhat confused and agitated and had been 

incontinent of urine.





Past medical history:


Chronic obstructive pulmonary disease, pneumonia, coronary artery disease with 

prior MI, hypertension, atrial fibrillation, congestive cardiac failure.  No 

history of diabetes, asthma, tuberculosis, renal disease or liver disease or 

bleeding disorder.





Past surgical history:


Cardiac catheterization with stent x2.





Allergies:


Possible allergy to penicillin.





Social history:


Tobacco 1.5 packs/day until approximately 6 years ago.


Alcohol intake is social only.


Denies illicit drug use.





No known work exposures to asbestos or toxic chemicals.


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





Past Patient History





- Infectious Disease


Hx of Infectious Diseases: None





- Tetanus Immunizations


Tetanus Immunization: Unknown





- Past Medical History & Family History


Past Medical History?: Yes





- Past Social History


Smoking Status: Former Smoker


Chewing Tobacco Use: No


Cigar Use: No


Alcohol: Social


Drugs: Denies


Home Situation {Lives}: Alone





- CARDIAC


Hx Atrial Fibrillation: Yes


Hx Congestive Heart Failure: Yes


Hx Heart Attack: Yes


Hx Hypercholesterolemia: Yes


Hx Hypertension: Yes


Hx Peripheral Edema: Yes





- PULMONARY


Hx Chronic Obstructive Pulmonary Disease (COPD): Yes


Hx Emphysema: Yes


Hx Pneumonia: Yes





- NEUROLOGICAL


Hx Syncope: Yes





- HEENT


Hx HEENT Problems: No





- RENAL


Hx Chronic Kidney Disease: No





- ENDOCRINE/METABOLIC


Hx Endocrine Disorders: No





- HEMATOLOGICAL/ONCOLOGICAL


Hx Blood Disorders: No


Hx Human Immunodeficiency Virus (HIV): No





- INTEGUMENTARY


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





- MUSCULOSKELETAL/RHEUMATOLOGICAL


Hx Falls: Yes


Hx Fractures: Yes (right ankle)





- GASTROINTESTINAL


Hx Gastrointestinal Disorders: No





- GENITOURINARY/GYNECOLOGICAL


Hx Incontinence: Yes


Other/Comment: Stress Incontinence





- PSYCHIATRIC


Hx Psychophysiologic Disorder: No


Hx Substance Use: No





- SURGICAL HISTORY


Hx Appendectomy: No


Hx Coronary Stent: Yes (x 2)





- ANESTHESIA


Hx Anesthesia: Yes


Hx Anesthesia Reactions: No


Hx Malignant Hyperthermia: No


Has any member of the family had a problem w/ anesthesia?: No





Meds


Allergies/Adverse Reactions: 


                                    Allergies











Allergy/AdvReac Type Severity Reaction Status Date / Time


 


Penicillins Allergy Mild SWELLING Verified 02/02/19 17:25














- Medications


Medications: 


                               Current Medications





Albuterol/Ipratropium (Duoneb 3 Mg/0.5 Mg (3 Ml) Ud)  3 ml INH RQID [facility]


   Last Admin: 02/04/19 07:41 Dose:  3 ml


Amiodarone HCl (Cordarone)  200 mg PO BID [facility]


Aspirin (Ecotrin)  81 mg PO DAILY [facility]


   Last Admin: 02/04/19 12:42 Dose:  81 mg


Bisoprolol Fumarate (Zebeta)  5 mg PO DAILY [facility]


   Last Admin: 02/04/19 13:06 Dose:  Not Given


Clopidogrel Bisulfate (Plavix)  75 mg PO DAILY [facility]


   Last Admin: 02/04/19 12:42 Dose:  75 mg


Diltiazem HCl (Cardizem)  30 mg PO BID [facility]


Enoxaparin Sodium (Lovenox)  90 mg SC Q12 [facility]; Protocol


   Last Admin: 02/04/19 12:39 Dose:  90 mg


Azithromycin 500 mg/ Sodium (Chloride)  250 mls @ 250 mls/hr IVPB DAILY [facility]; 

Protocol


   Last Admin: 02/04/19 12:43 Dose:  250 mls/hr


Diltiazem HCl 125 mg/ Sodium (Chloride)  125 mls @ 5 mls/hr IV .Q24H ONE; Prot

ocol


   Stop: 02/05/19 10:27


   Last Admin: 02/04/19 13:12 Dose:  5 mg/hr, 5 mls/hr


Clindamycin Phosphate (Cleocin)  600 mg in 50 mls @ 50 mls/hr IVPB Q8 [facility]; 

Protocol


Isosorbide Mononitrate (Imdur Er)  30 mg PO DAILY [facility]


   Last Admin: 02/04/19 12:38 Dose:  30 mg


Lactobacillus Acidophilus (Bacid Acidophilus)  1 cap PO BID [facility]


Lisinopril (Zestril)  2.5 mg PO DAILY [facility]


   Last Admin: 02/04/19 13:05 Dose:  2.5 mg


Pantoprazole Sodium (Protonix Ec Tab)  40 mg PO DAILY [facility]


   Last Admin: 02/04/19 12:48 Dose:  40 mg


Senna/Docusate Sodium (Senokot S 50 Mg-8.6 Mg)  2 tab PO HS [facility]


   Last Admin: 02/03/19 21:33 Dose:  2 tab











Physical Exam





- Additional Findings


Additional findings: 





Chronically ill-appearing female who has congested cough but no sputum 

production.


Awake and oriented x3.  Cooperative with physical examination.


Pharynx is pink and mucous membranes are moist without exudate.


Nasal passages are patent bilaterally without bleeding or exudate.


Conjunctivae are pink and there is no scleral icterus.


The neck is supple her trachea is midline.  No carotid bruit.


Increased AP diameter of the thorax.  No dullness on chest percussion.


Breath sounds are diminished bilaterally without audible wheezing or bronchial 

breathing.


Dry rales are heard in the lower lobes posteriorly.  Scattered rhonchi.


Heart sounds are distant, tachycardic.


Abdomen is soft, fleshy and nontender with normal bowel sounds.


Trace dependent edema of both lower extremities without cyanosis.  No calf 

tenderness.





Results





- Vital Signs


Recent Vital Signs: 


                                Last Vital Signs











Temp  97.9 F   02/04/19 13:00


 


Pulse  79   02/04/19 13:05


 


Resp  20   02/04/19 13:00


 


BP  120/79   02/04/19 13:05


 


Pulse Ox  93 L  02/04/19 13:00














- Labs


Result Diagrams: 


                                 02/03/19 06:10





                                 02/03/19 06:10


Labs: 


                         Laboratory Results - last 24 hr











  02/03/19 02/03/19 02/04/19





  10:09 11:53 05:20


 


pCO2   


 


pO2   


 


HCO3   


 


ABG pH   


 


ABG Total CO2   


 


ABG O2 Saturation   


 


ABG Base Excess   


 


Chito Test   


 


ABG Potassium   


 


A-a O2 Difference   


 


Sodium   


 


Chloride   


 


Glucose   


 


Lactate   


 


FiO2   


 


C-React Prot High Sens   > 15.00 H 


 


Triglycerides    88


 


Cholesterol    117


 


LDL Cholesterol Direct    67


 


HDL Cholesterol    26 L


 


Arterial Blood Potassium   


 


Urine Color  Yellow  


 


Urine Clarity  Slighty-cloudy  


 


Urine pH  5.0  


 


Ur Specific Gravity  1.027  


 


Urine Protein  Negative  


 


Urine Glucose (UA)  Neg  


 


Urine Ketones  Negative  


 


Urine Blood  Negative  


 


Urine Nitrate  Negative  


 


Urine Bilirubin  Negative  


 


Urine Urobilinogen  0.2-1.0  


 


Ur Leukocyte Esterase  Neg  


 


Urine RBC (Auto)  < 1  


 


Urine Microscopic WBC  1  


 


Ur Squamous Epith Cells  2  














  02/04/19





  10:25


 


pCO2  50 H


 


pO2  52 L


 


HCO3  26.7


 


ABG pH  7.37


 


ABG Total CO2  30.4 H


 


ABG O2 Saturation  90.3 L


 


ABG Base Excess  2.7


 


Chito Test  Yes


 


ABG Potassium  3.0 L


 


A-a O2 Difference  142.0


 


Sodium  134.0


 


Chloride  104.0


 


Glucose  157 H


 


Lactate  0.9


 


FiO2  36.0


 


C-React Prot High Sens 


 


Triglycerides 


 


Cholesterol 


 


LDL Cholesterol Direct 


 


HDL Cholesterol 


 


Arterial Blood Potassium  3.0 L


 


Urine Color 


 


Urine Clarity 


 


Urine pH 


 


Ur Specific Gravity 


 


Urine Protein 


 


Urine Glucose (UA) 


 


Urine Ketones 


 


Urine Blood 


 


Urine Nitrate 


 


Urine Bilirubin 


 


Urine Urobilinogen 


 


Ur Leukocyte Esterase 


 


Urine RBC (Auto) 


 


Urine Microscopic WBC 


 


Ur Squamous Epith Cells 














Assessment & Plan


(1) Pneumonia


Assessment and Plan: 


Right lower lobe.


Status: Acute   Priority: High   





(2) Afib


Assessment and Plan: 


With RVR.


Status: Acute   Priority: High   





(3) COPD exacerbation


Status: Chronic   Priority: High   





(4) Hypoxemia requiring supplemental oxygen


Status: Chronic   Priority: High   





- Date & Time


Date: 02/04/19


Time: 13:33

## 2019-02-04 NOTE — PCM.RRT
<Lashay Quintero - Last Filed: 02/04/19 13:03>





- Constitutional


Appears: Non-toxic





- Head


Head Exam: ATRAUMATIC, NORMOCEPHALIC





- Eyes


Eye Exam: EOMI





- Respiratory Exam


Respiratory Exam: Decreased Breath Sounds.  absent: Wheezes, Respiratory 

Distress





- Cardiovascular Exam


Cardiovascular Exam: Tachycardia, Irregular Rhythm





- GI/Abdominal Exam


GI & Abdominal Exam: Soft, Normal Bowel Sounds.  absent: Guarding, Rigid, 

Tenderness





- Neurological Exam


Neurological Exam: Alert, Awake, Oriented x3





- Extremities Exam


Extremities Exam: absent: Calf Tenderness





Plan





- Assessment of Findings&Treatment Plan


RRT called by nurse


RRT initial time: 10:16am


RRT response: 10:16am


RRT location: [facility]


RRT called on 87 y.o F because of atrial fibrillation with rate of 150 bpm. 

Patient has a hx of COPD, mod MR with pulmonary HTN (EF 50-55% as noted in echo 

9/16/2017), CAD & paroxysmal atrial fibrillation. She denied nay chest pain, 

palpitations, SOB, calf pain or swelling. 








Initial VS: 135/74, 142bpm, 89% O2sat on 4 L of oxygen via NC


-Interventions: EKG showed a fibr w/RVR


-ABG showed PCO2- 50, PO2-52 PH-7.37


-EKG showed a fib with ventricular rate of 145 bpm


-Chest CT angio ordered


-5 mg dose of cardizem was given IVP stat; spo2 remained at 88% with pulse of 

149bpm 


-Oxygen increased to 12L with spo2 remaining 88-89%


-Cardizem drip was started at rate of 5mg per hour 


-Oxygen administration changed from NC to small mask


2nd state dose of 5mg IVP cardizem was given; pulse continued to decreased to 

128-135bpm with spo2-97%


-3rd stat dose of cardizem given 30mg PO followed by 4th dose stat dose of 

cardizem 5mg IV.


-Following this, HR decreased to 118 bpm, BP-117/68, spo2 97%


-Oxygen titrated down to maintain goal of 92-95% given her hx of COPD. 





-Therapeutic lovenox 90mg SC BID given. 





End VS: 127 bpm, BP: 123/78, spo2-95%








87 y.o F with hx of COPD, mod. MR with pulmonary HTN (EF 50-55% as noted in echo

9/16/2017), CAD & paroxysmal atrial fibrillation.


-Continue cardizem drip for atrial fibrillation with VS monitoring.


-Continue telemetry monitoring.


-FU CT angio.


-Continue telemetry admission.


-Continue therapeutic lovenox








<Ant Booker D - Last Filed: 02/04/19 14:39>





Attending/Attestation





- Attestation


I have personally seen and examined this patient.: Yes


I have fully participated in the care of the patient.: Yes


I have reviewed all pertinent clinical information, including history, physical 

exam and plan: Yes


Notes (Text): 





02/04/19 14:38


Patient seen and examined with resident during RRT. Case discussed and agreed 

with assessment and plan.

## 2019-02-04 NOTE — CT
Date of service: 



02/04/2019



PROCEDURE:  CT Angiography of the Brain and Neck.



HISTORY:

cerebral ischemia



COMPARISON:

None available.



TECHNIQUE:

CT angiography of the head and neck was performed following 

intravenous contrast administration.  Coronal and sagittal maximum 

intensity projection reformatted images were generated.



Contrast Dose: Visipaque 320, 145 cc



Radiation dose:



Total exam DLP = 2167.06 mGy-cm.



This CT exam was performed using one or more of the following dose 

reduction techniques: Automated exposure control, adjustment of the 

mA and/or kV according to patient size, and/or use of iterative 

reconstruction technique.



FINDINGS:



INTERNAL CEREBRAL ARTERIES:

Unremarkable. The skull base, petrous, cavernous and supraclinoid 

segments are bilaterally widely patent. 



ANTERIOR CEREBRAL ARTERIES:

Unremarkable. A1 and A2 segments are widely patent. Smaller distal 

branches unremarkable, as visualized.



MIDDLE CEREBRAL ARTERIES:

Unremarkable. M1 and M2 segments are widely patent. Perisylvian 

branches grossly symmetric.



POSTERIOR CIRCULATION:

Basilar Artery: Unremarkable.



Distal Vertebral Arteries: Left dominant vertebrobasilar circulation.



Posterior Cerebral Arteries: Unremarkable.



Posterior Inferior Cerebellar Arteries: Unremarkable.



NECK CTA:



Common Carotid arteries: The bilateral common carotid appear widely 

patent from their origins to their bifurcations with no significant 

stenosis appreciated. No evidence to suggest common carotid artery 

dissection.



Internal Carotid arteries: No significant stenosis is appreciated 

throughout the cervical internal carotid artery segments bilaterally 

and there is no evidence of dissection either.



External Carotid arteries: Appear unremarkable bilaterally. 



Vertebral arteries: The bilateral vertebral arteries appear normal in 

caliber from their origins to their distal cervical segments. No 

significant stenosis or definite pattern of dissection.  



ANEURYSM/ VASCULAR MALFORMATIONS:

None.



OTHER FINDINGS:

Incidental note is made of a left thyroid mass poorly enhancing 

measuring 2.3 x 1.9 cm for which follow-up thyroid ultrasound and or 

nuclear thyroid scan with uptake can be performed. 



IMPRESSION:

No occlusion or significant stenosis in the bilateral common or 

internal carotid arteries in the neck.  Left dominant vertebrobasilar 

circulation.  Intracranial and neck CT angiography otherwise 

unremarkable.



Incidental left thyroid mass 2.3 cm for which follow-up ultrasound or 

nuclear thyroid scan can be performed for added characterization.

## 2019-02-05 LAB
BASOPHILS # BLD AUTO: 0 K/UL (ref 0–0.2)
BASOPHILS NFR BLD: 0.1 % (ref 0–2)
BUN SERPL-MCNC: 19 MG/DL (ref 7–17)
CALCIUM SERPL-MCNC: 8.5 MG/DL (ref 8.4–10.2)
EOSINOPHIL # BLD AUTO: 0 K/UL (ref 0–0.7)
EOSINOPHIL NFR BLD: 0 % (ref 0–4)
ERYTHROCYTE [DISTWIDTH] IN BLOOD BY AUTOMATED COUNT: 16 % (ref 11.5–14.5)
GFR NON-AFRICAN AMERICAN: > 60
HGB BLD-MCNC: 12.1 G/DL (ref 12–16)
LYMPHOCYTES # BLD AUTO: 0.5 K/UL (ref 1–4.3)
LYMPHOCYTES NFR BLD AUTO: 14 % (ref 20–40)
MCH RBC QN AUTO: 27.7 PG (ref 27–31)
MCHC RBC AUTO-ENTMCNC: 31.9 G/DL (ref 33–37)
MCV RBC AUTO: 86.9 FL (ref 81–99)
MONOCYTES # BLD: 0.4 K/UL (ref 0–0.8)
MONOCYTES NFR BLD: 9.1 % (ref 0–10)
NEUTROPHILS # BLD: 3 K/UL (ref 1.8–7)
NEUTROPHILS NFR BLD AUTO: 76.8 % (ref 50–75)
NRBC BLD AUTO-RTO: 0 % (ref 0–0)
PLATELET # BLD: 174 K/UL (ref 130–400)
PMV BLD AUTO: 8.1 FL (ref 7.2–11.7)
RBC # BLD AUTO: 4.37 MIL/UL (ref 3.8–5.2)
WBC # BLD AUTO: 3.9 K/UL (ref 4.8–10.8)

## 2019-02-05 RX ADMIN — PANTOPRAZOLE SODIUM SCH MG: 40 TABLET, DELAYED RELEASE ORAL at 10:10

## 2019-02-05 RX ADMIN — Medication SCH CAP: at 10:05

## 2019-02-05 RX ADMIN — Medication SCH CAP: at 17:38

## 2019-02-05 RX ADMIN — CLINDAMYCIN IN 5 PERCENT DEXTROSE SCH MLS/HR: 12 INJECTION, SOLUTION INTRAVENOUS at 10:07

## 2019-02-05 RX ADMIN — IPRATROPIUM BROMIDE SCH MG: 0.5 SOLUTION RESPIRATORY (INHALATION) at 07:47

## 2019-02-05 RX ADMIN — IPRATROPIUM BROMIDE SCH: 0.5 SOLUTION RESPIRATORY (INHALATION) at 11:14

## 2019-02-05 RX ADMIN — CLINDAMYCIN IN 5 PERCENT DEXTROSE SCH MLS/HR: 12 INJECTION, SOLUTION INTRAVENOUS at 02:10

## 2019-02-05 RX ADMIN — ENOXAPARIN SODIUM SCH MG: 100 INJECTION SUBCUTANEOUS at 12:43

## 2019-02-05 RX ADMIN — CLINDAMYCIN IN 5 PERCENT DEXTROSE SCH MLS/HR: 12 INJECTION, SOLUTION INTRAVENOUS at 17:41

## 2019-02-05 RX ADMIN — IPRATROPIUM BROMIDE SCH: 0.5 SOLUTION RESPIRATORY (INHALATION) at 04:59

## 2019-02-05 RX ADMIN — IPRATROPIUM BROMIDE SCH MG: 0.5 SOLUTION RESPIRATORY (INHALATION) at 19:54

## 2019-02-05 RX ADMIN — IPRATROPIUM BROMIDE SCH MG: 0.5 SOLUTION RESPIRATORY (INHALATION) at 15:06

## 2019-02-05 RX ADMIN — STANDARDIZED SENNA CONCENTRATE AND DOCUSATE SODIUM SCH TAB: 8.6; 5 TABLET, FILM COATED ORAL at 22:37

## 2019-02-05 RX ADMIN — ENOXAPARIN SODIUM SCH MG: 100 INJECTION SUBCUTANEOUS at 00:32

## 2019-02-05 NOTE — CP.PCM.PN
<Lashay Quintero - Last Filed: 02/05/19 12:02>





Subjective





- Date & Time of Evaluation


Date of Evaluation: 02/05/19


Time of Evaluation: 09:06





- Subjective


Subjective: 





Patient was seen and examined this morning, awake, alert in bed on 4 L of oxygen

via NC.  She continues to complain of cough but denied any fever, chills, chest 

pain, palpitations or shortness of breathl 





Objective





- Vital Signs/Intake and Output


Vital Signs (last 24 hours): 


                                        











Temp Pulse Resp BP Pulse Ox


 


 98.1 F   105 H  20   108/70   95 


 


 02/05/19 08:08  02/05/19 10:12  02/05/19 10:05  02/05/19 10:12  02/05/19 10:05








Intake and Output: 


                                        











 02/05/19 02/05/19





 06:59 18:59


 


Intake Total 125 


 


Balance 125 














- Medications


Medications: 


                               Current Medications





Amiodarone HCl (Cordarone)  200 mg PO BID [facility]


   Last Admin: 02/05/19 10:07 Dose:  200 mg


Aspirin (Ecotrin)  81 mg PO DAILY [facility]


   Last Admin: 02/05/19 10:08 Dose:  81 mg


Bisoprolol Fumarate (Zebeta)  5 mg PO DAILY [facility]


   Last Admin: 02/05/19 10:12 Dose:  5 mg


Clopidogrel Bisulfate (Plavix)  75 mg PO DAILY [facility]


   Last Admin: 02/05/19 10:09 Dose:  75 mg


Diltiazem HCl (Cardizem)  30 mg PO BID [facility]


   Last Admin: 02/05/19 10:05 Dose:  30 mg


Enoxaparin Sodium (Lovenox)  90 mg SC Q12@0000,1200 [facility]; Protocol


   Last Admin: 02/05/19 00:32 Dose:  90 mg


Hydrocortisone Sodium Succinate (Solu-Cortef)  100 mg IV DAILY [facility]


   Last Admin: 02/05/19 10:10 Dose:  100 mg


Azithromycin 500 mg/ Sodium (Chloride)  250 mls @ 250 mls/hr IVPB DAILY [facility]; 

Protocol


   Last Admin: 02/05/19 10:13 Dose:  250 mls/hr


Clindamycin Phosphate (Cleocin)  600 mg in 50 mls @ 50 mls/hr IVPB Q8 [facility]; 

Protocol


   Last Admin: 02/05/19 10:07 Dose:  50 mls/hr


Ipratropium Bromide (Atrovent)  0.5 mg IH RQ4 [facility]


   Last Admin: 02/05/19 11:14 Dose:  Not Given


Isosorbide Mononitrate (Imdur Er)  30 mg PO DAILY [facility]


   Last Admin: 02/05/19 10:08 Dose:  30 mg


Lactobacillus Acidophilus (Bacid Acidophilus)  1 cap PO BID [facility]


   Last Admin: 02/05/19 10:05 Dose:  1 cap


Lisinopril (Zestril)  2.5 mg PO DAILY [facility]


   Last Admin: 02/05/19 10:12 Dose:  2.5 mg


Pantoprazole Sodium (Protonix Ec Tab)  40 mg PO DAILY [facility]


   Last Admin: 02/05/19 10:10 Dose:  40 mg


Senna/Docusate Sodium (Senokot S 50 Mg-8.6 Mg)  2 tab PO HS [facility]


   Last Admin: 02/04/19 21:17 Dose:  2 tab











- Labs


Labs: 


                                        





                                 02/05/19 04:30 





                                 02/05/19 04:30 











- Constitutional


Appears: Non-toxic





- Head Exam


Head Exam: ATRAUMATIC





- Eye Exam


Eye Exam: EOMI





- ENT Exam


ENT Exam: Mucous Membranes Moist





- Respiratory Exam


Respiratory Exam: absent: Wheezes, Respiratory Distress, Stridor


Additional comments: 





scattered rhonchi





- Cardiovascular Exam


Cardiovascular Exam: REGULAR RHYTHM, +S1, +S2





- GI/Abdominal Exam


GI & Abdominal Exam: Soft, Normal Bowel Sounds.  absent: Guarding, Rigid, 

Tenderness





- Extremities Exam


Extremities Exam: absent: Calf Tenderness





- Neurological Exam


Neurological Exam: Alert, Oriented x3





- Psychiatric Exam


Psychiatric exam: Normal Mood





- Skin


Skin Exam: Dry, Intact





Assessment and Plan





- Assessment and Plan (Free Text)


Assessment: 





87 y.o F with hx of COPD, CAD, CHF, CAD  & paroxysmal atrial fibrillation 

admitted for evaluation of syncope.





1. Syncope (Acute )


-Possibly due to paroxysmal a fib given RRT yesterday for a fib with VRV of 148 

bpm. 


-Chest CT negative for pulmonary embolism.


Head/neck CTA showed no occlusion or stenosis. 


-Carotid ultrasound showed no evidence of stenosis. 


-Head CT showed generalized atrophy, nonspecific white changes, no mass effect 

or edema (2/2/2019).


- Echo done 2/2/2019 showed mild concentric left ventricular hypertrophy with EF

of 50-55%. LV diastolic function could not be assessed due to a. fib. Left 

atrium is mildly dilated; mild mitral & tricuspid valve regurg. (Last echo in 

9/16/2017 which showed EF of 50-55%, moderate mitral regurg/tricuspid regurg, 

severe pulmonary HTN). 





2. Paroxysma Atrial fibrillation (VRV of 148 bpm yesterday)


-Dr. Collado's recommendation appreciated.


-Continue Amiodorone 200mg PO BID.


-Continue Cardizem 30mg PO BID. Cardizem drip discontinued because of HR mainta

ined in 60's as noted on monitor with conversion into sinus rhythym. 


-Continue therapeutic dose of Lovenox 90mg SC Q12. 





3. Pneumonia (Acute)


-Dr. Pyle's recommendations appreciated.


-CT showing right lower lobe infiltrate.


-Mycoplasma pneumonia IgM negative.


-Continue Azithromycin 500mg & Clindamycin 600mg IV.


-FU sputum culture. 





4.  COPD with hypoxemia (chronic)


-CT findings of centrilobular emphysema.


-Continue duonebs.


-Solumedrol 100mg IV QD.


-Continue supplemental ox.


-WBC decreased to 3.9 from 5.4 (2/3/2019); was 7.6 (2/2/2019)





5. CHF (chronic)


-Echo done 2/2/2019 showed mild concentric left ventricular hypertrophy with EF 

of 50-55%. LV diastolic function could not be assessed due to a. fib. Left 

atrium is mildly dilated; mild mitral & tricuspid valve regurg.


-Continue zebeta 5mg PO QD.


-Continue lisinopril 2.5mg PO QD


-Last echo in 9/16/2017 which showed EF of 50-55%, moderate mitral 

regurg/tricuspid regurg, severe pulmonary HTN. 





6. Thyroid mass


-incidental left thyroid mass 2.3cm noted on Head/neck CTA


-FU thyroid ultrasound.


-TSH:4.04 (2/4/2019)





7. hx of stent placement


-Continue aspirin 81 mg PO & plavix 75 mg PO. 





8. Diet


Heart Healthy diet





9. GI prophylaxis


-Continue Protonix 40mg QD





10. DVT prophylaxis


(on theraupetic lovenox)





<Madisyn Merrill - Last Filed: 02/05/19 20:11>





Objective





- Vital Signs/Intake and Output


Vital Signs (last 24 hours): 


                                        











Temp Pulse Resp BP Pulse Ox


 


 98.8 F   62   18   104/65   91 L


 


 02/05/19 16:53  02/05/19 19:09  02/05/19 17:38  02/05/19 19:09  02/05/19 16:53











- Medications


Medications: 


                               Current Medications





Amiodarone HCl (Cordarone)  200 mg PO BID [facility]


   Last Admin: 02/05/19 19:09 Dose:  200 mg


Aspirin (Ecotrin)  81 mg PO DAILY [facility]


   Last Admin: 02/05/19 10:08 Dose:  81 mg


Bisoprolol Fumarate (Zebeta)  5 mg PO DAILY [facility]


   Last Admin: 02/05/19 10:12 Dose:  5 mg


Clopidogrel Bisulfate (Plavix)  75 mg PO DAILY [facility]


   Last Admin: 02/05/19 10:09 Dose:  75 mg


Diltiazem HCl (Cardizem)  30 mg PO BID [facility]


Enoxaparin Sodium (Lovenox)  90 mg SC Q12@0000,1200 [facility]; Protocol


   Last Admin: 02/05/19 12:43 Dose:  90 mg


Hydrocortisone Sodium Succinate (Solu-Cortef)  100 mg IV DAILY [facility]


   Last Admin: 02/05/19 10:10 Dose:  100 mg


Azithromycin 500 mg/ Sodium (Chloride)  250 mls @ 250 mls/hr IVPB DAILY [facility]; 

Protocol


   Last Admin: 02/05/19 10:13 Dose:  250 mls/hr


Clindamycin Phosphate (Cleocin)  600 mg in 50 mls @ 50 mls/hr IVPB Q8 [facility]; 

Protocol


   Last Admin: 02/05/19 17:41 Dose:  50 mls/hr


Ipratropium Bromide (Atrovent)  0.5 mg IH RQ4 [facility]


   Last Admin: 02/05/19 19:54 Dose:  0.5 mg


Isosorbide Mononitrate (Imdur Er)  30 mg PO DAILY [facility]


   Last Admin: 02/05/19 10:08 Dose:  30 mg


Lactobacillus Acidophilus (Bacid Acidophilus)  1 cap PO BID [facility]


   Last Admin: 02/05/19 17:38 Dose:  1 cap


Lisinopril (Zestril)  2.5 mg PO DAILY [facility]


   Last Admin: 02/05/19 10:12 Dose:  2.5 mg


Pantoprazole Sodium (Protonix Ec Tab)  40 mg PO DAILY [facility]


   Last Admin: 02/05/19 10:10 Dose:  40 mg


Senna/Docusate Sodium (Senokot S 50 Mg-8.6 Mg)  2 tab PO HS [facility]


   Last Admin: 02/04/19 21:17 Dose:  2 tab











- Labs


Labs: 


                                        





                                 02/05/19 04:30 





                                 02/05/19 04:30 











Attending/Attestation





- Attestation


I have personally seen and examined this patient.: Yes


I have fully participated in the care of the patient.: Yes


I have reviewed all pertinent clinical information, including history, physical 

exam and plan: Yes


Notes (Text): 








Syncope


Atrial Fibrillation with RVR, ? Paroxysmal


COPD exacerbation  on Home Oxygen


Pneumonia prob bacterial


Thyroid Nodules





- d/c Cardizem drip, HR now controlled


- Started on Amiodarone by Dr Collado


- cont Cardizem PO 30 mg bid  with holding parameters- hold if HR less than 65. 

Pt also on low dose Bisoprolol


- cont IV Clinda and Azithro - will need 8 more days of IV antibiotics - will 

arrange for TCU placement to continue IV abx


-cont Solucortef IV, and Xopenex and Atrovent


-Thryoid Sonogram


- cont Lovenox  therapeutic dose - will need to change to Eliquis or Xarelto 

prior to discharge 


- d/c ASA  but cont Plavix

## 2019-02-05 NOTE — CP.PCM.PN
Subjective





- Date & Time of Evaluation


Date of Evaluation: 02/05/19


Time of Evaluation: 11:38





- Subjective


Subjective: 





Neuro Follow-Up Note:





Mrs. Charles was evaluated this morning sitting in chair OOB.  She offers no 

complaints today and states that she feels much better today.  She still admits 

to an occasional cough but states that it is improving.  Denies h/a, dizziness, 

visual changes, chest pain, palpitations, sob, abd pain, n/v/d.  Chart reviewed;

discussed PT with primary nurse.








Objective





- Vital Signs/Intake and Output


Vital Signs (last 24 hours): 


                                        











Temp Pulse Resp BP Pulse Ox


 


 98.1 F   105 H  20   108/70   95 


 


 02/05/19 08:08  02/05/19 10:12  02/05/19 10:05  02/05/19 10:12  02/05/19 10:05








Intake and Output: 


                                        











 02/05/19 02/05/19





 06:59 18:59


 


Intake Total 125 


 


Balance 125 














- Medications


Medications: 


                               Current Medications





Amiodarone HCl (Cordarone)  200 mg PO BID [facility]


   Last Admin: 02/05/19 10:07 Dose:  200 mg


Aspirin (Ecotrin)  81 mg PO DAILY [facility]


   Last Admin: 02/05/19 10:08 Dose:  81 mg


Bisoprolol Fumarate (Zebeta)  5 mg PO DAILY [facility]


   Last Admin: 02/05/19 10:12 Dose:  5 mg


Clopidogrel Bisulfate (Plavix)  75 mg PO DAILY [facility]


   Last Admin: 02/05/19 10:09 Dose:  75 mg


Diltiazem HCl (Cardizem)  30 mg PO BID [facility]


   Last Admin: 02/05/19 10:05 Dose:  30 mg


Enoxaparin Sodium (Lovenox)  90 mg SC Q12@0000,1200 [facility]; Protocol


   Last Admin: 02/05/19 00:32 Dose:  90 mg


Hydrocortisone Sodium Succinate (Solu-Cortef)  100 mg IV DAILY [facility]


   Last Admin: 02/05/19 10:10 Dose:  100 mg


Azithromycin 500 mg/ Sodium (Chloride)  250 mls @ 250 mls/hr IVPB DAILY [facility]; 

Protocol


   Last Admin: 02/05/19 10:13 Dose:  250 mls/hr


Clindamycin Phosphate (Cleocin)  600 mg in 50 mls @ 50 mls/hr IVPB Q8 [facility]; 

Protocol


   Last Admin: 02/05/19 10:07 Dose:  50 mls/hr


Ipratropium Bromide (Atrovent)  0.5 mg IH RQ4 [facility]


   Last Admin: 02/05/19 11:14 Dose:  Not Given


Isosorbide Mononitrate (Imdur Er)  30 mg PO DAILY [facility]


   Last Admin: 02/05/19 10:08 Dose:  30 mg


Lactobacillus Acidophilus (Bacid Acidophilus)  1 cap PO BID [facility]


   Last Admin: 02/05/19 10:05 Dose:  1 cap


Lisinopril (Zestril)  2.5 mg PO DAILY [facility]


   Last Admin: 02/05/19 10:12 Dose:  2.5 mg


Pantoprazole Sodium (Protonix Ec Tab)  40 mg PO DAILY [facility]


   Last Admin: 02/05/19 10:10 Dose:  40 mg


Senna/Docusate Sodium (Senokot S 50 Mg-8.6 Mg)  2 tab PO HS [facility]


   Last Admin: 02/04/19 21:17 Dose:  2 tab











- Labs


Labs: 


                                        





                                 02/05/19 04:30 





                                 02/05/19 04:30 











- Constitutional


Appears: Well, Non-toxic





- Head Exam


Head Exam: ATRAUMATIC, NORMAL INSPECTION, NORMOCEPHALIC





- Eye Exam


Eye Exam: EOMI, Normal appearance.  absent: Nystagmus


Pupil Exam: NORMAL ACCOMODATION, PERRL





- ENT Exam


ENT Exam: Mucous Membranes Moist





- Neck Exam


Neck Exam: Full ROM, Normal Inspection





- Respiratory Exam


Respiratory Exam: NORMAL BREATHING PATTERN (on nasal cannula, no SOB noted)





- Cardiovascular Exam


Cardiovascular Exam: Irregular Rhythm





- GI/Abdominal Exam


GI & Abdominal Exam: Soft





- Extremities Exam


Extremities Exam: Full ROM.  absent: Calf Tenderness, Pedal Edema


Additional comments: 





Generalized weakness 2/2 deconditioning, however, strength to extremities is 

stronger than yesterday.





- Back Exam


Back Exam: Full ROM





- Neurological Exam


Neurological Exam: Alert, Awake, CN II-XII Intact, Reflexes Normal


Neuro motor strength exam: Left Upper Extremity: 4 ( 4/5), Right Upper Ex

tremity: 4 ( 4/5), Left Lower Extremity: 4, Right Lower Extremity: 4


Additional comments: 


Awake, alert, oriented.  She is forgetful.  Follows commands.


Speech clear, fluid


Generalized weakness 2/2 deconditioning, however, strength to extremities is 

stronger than yesterday.


No sensory deficits


No tremors


Gait not assessed---reviewed PT notes.








- Psychiatric Exam


Psychiatric exam: Normal Affect, Normal Mood





- Skin


Skin Exam: Normal Color





Assessment and Plan


(1) Altered mental state


Assessment & Plan: 


Imaging reviewed:  





-CTA Head and Neck: unremarkable to occlusion or stenosis


-CT Head: no acute intracranial involvement


-ECHO: EF 50-55%, afib, no LV thrombus


-CT Chest: right lower lobe infiltrate





-AMS likely 2/2 infectious process, though pt is now at her baseline


-Cardiology on case for afib.


-Continue current treatment (including ASA and Plavix for stroke prevention) and

antibiotics per primary team.


-Recommend rehab upon d/c for conditioning.


-Notify neuro of acute changes in pt's condition.  No further neuro recommenda

tions.  Please reconsult prn.





Thank you for allowing us to participate in this pt's care.


Case discussed with Dr. Flores


Status: Acute

## 2019-02-05 NOTE — CARD
--------------- APPROVED REPORT --------------





Date of service: 02/05/2019



EKG Measurement

Heart [id number]

[id number]

[id number]

[id number]



<Conclusion>

Atrial fibrillation

Septal infarct, age undetermined

Abnormal ECG

## 2019-02-05 NOTE — CP.PCM.PN
Subjective





- Date & Time of Evaluation


Date of Evaluation: 02/05/19


Time of Evaluation: 10:56





- Subjective


Subjective: 





Appears more comfortable today.


Seen while working with physical therapy.


Ambulating with the use of walker, short distances.


Remains afebrile, mildly tachycardic, normotensive.


Receiving empiric dual antibiotic therapy for CAP.


To have thyroid US for multiple nodules.





Trace dependant edema bilaterally, no cyanosis.


Neck is supple and trachea midline.


Short neck, cannot clearly see JVD.


No dullness on chest percussion, no subcut emphysema.


Breath sounds are diminished bilaterally.


Dry rales are present in both lower lobes posteriorly.


No audible wheezes or bronchial breath sounds.


Few scattered rhonchi in dependanr regions of both lungs.


Heart sounds are distant, regular.





CAP with exacerbation of COPD.


AMS likely related to the above.


Atrial fibrillation started on amiodarone.


Multinodular thyroid undergoing w/u.





Continue present medical and aerosol regimen.


Will begin to reduce hydrocortisone ASAP.


Continue supplemental oxygen and physical therapy.





Objective





- Vital Signs/Intake and Output


Vital Signs (last 24 hours): 


                                        











Temp Pulse Resp BP Pulse Ox


 


 98.1 F   105 H  20   108/70   95 


 


 02/05/19 08:08  02/05/19 10:12  02/05/19 10:05  02/05/19 10:12  02/05/19 10:05








Intake and Output: 


                                        











 02/04/19 02/05/19





 23:59 11:59


 


Intake Total 125 


 


Balance 125 














- Medications


Medications: 


                               Current Medications





Amiodarone HCl (Cordarone)  200 mg PO BID [facility]


   Last Admin: 02/05/19 10:07 Dose:  200 mg


Aspirin (Ecotrin)  81 mg PO DAILY [facility]


   Last Admin: 02/05/19 10:08 Dose:  81 mg


Bisoprolol Fumarate (Zebeta)  5 mg PO DAILY [facility]


   Last Admin: 02/05/19 10:12 Dose:  5 mg


Clopidogrel Bisulfate (Plavix)  75 mg PO DAILY [facility]


   Last Admin: 02/05/19 10:09 Dose:  75 mg


Diltiazem HCl (Cardizem)  30 mg PO BID [facility]


   Last Admin: 02/05/19 10:05 Dose:  30 mg


Enoxaparin Sodium (Lovenox)  90 mg SC Q12@0000,1200 [facility]; Protocol


   Last Admin: 02/05/19 00:32 Dose:  90 mg


Hydrocortisone Sodium Succinate (Solu-Cortef)  100 mg IV DAILY [facility]


   Last Admin: 02/05/19 10:10 Dose:  100 mg


Azithromycin 500 mg/ Sodium (Chloride)  250 mls @ 250 mls/hr IVPB DAILY [facility]; 

Protocol


   Last Admin: 02/05/19 10:13 Dose:  250 mls/hr


Clindamycin Phosphate (Cleocin)  600 mg in 50 mls @ 50 mls/hr IVPB Q8 [facility]; 

Protocol


   Last Admin: 02/05/19 10:07 Dose:  50 mls/hr


Ipratropium Bromide (Atrovent)  0.5 mg IH RQ4 [facility]


   Last Admin: 02/05/19 07:47 Dose:  0.5 mg


Isosorbide Mononitrate (Imdur Er)  30 mg PO DAILY [facility]


   Last Admin: 02/05/19 10:08 Dose:  30 mg


Lactobacillus Acidophilus (Bacid Acidophilus)  1 cap PO BID [facility]


   Last Admin: 02/05/19 10:05 Dose:  1 cap


Lisinopril (Zestril)  2.5 mg PO DAILY [facility]


   Last Admin: 02/05/19 10:12 Dose:  2.5 mg


Pantoprazole Sodium (Protonix Ec Tab)  40 mg PO DAILY [facility]


   Last Admin: 02/05/19 10:10 Dose:  40 mg


Senna/Docusate Sodium (Senokot S 50 Mg-8.6 Mg)  2 tab PO HS [facility]


   Last Admin: 02/04/19 21:17 Dose:  2 tab











- Labs


Labs: 


                                        





                                 02/05/19 04:30 





                                 02/05/19 04:30 











Assessment and Plan


(1) Pneumonia


Status: Acute   





(2) Afib


Status: Acute   





(3) COPD exacerbation


Status: Chronic   





(4) Hypoxemia requiring supplemental oxygen


Status: Chronic

## 2019-02-06 ENCOUNTER — HOSPITAL ENCOUNTER (INPATIENT)
Dept: HOSPITAL 14 - H.TCU | Age: 84
LOS: 13 days | Discharge: HOME HEALTH | DRG: 308 | End: 2019-02-19
Attending: INTERNAL MEDICINE | Admitting: INTERNAL MEDICINE
Payer: MEDICARE

## 2019-02-06 VITALS
RESPIRATION RATE: 20 BRPM | TEMPERATURE: 98.1 F | DIASTOLIC BLOOD PRESSURE: 77 MMHG | HEART RATE: 76 BPM | SYSTOLIC BLOOD PRESSURE: 146 MMHG

## 2019-02-06 VITALS — OXYGEN SATURATION: 93 %

## 2019-02-06 VITALS — BODY MASS INDEX: 33.9 KG/M2

## 2019-02-06 DIAGNOSIS — I11.0: ICD-10-CM

## 2019-02-06 DIAGNOSIS — L30.9: ICD-10-CM

## 2019-02-06 DIAGNOSIS — Z95.5: ICD-10-CM
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
## 2019-02-06 DIAGNOSIS — I50.9: ICD-10-CM

## 2019-02-06 DIAGNOSIS — E78.5: ICD-10-CM

## 2019-02-06 DIAGNOSIS — R09.02: ICD-10-CM

## 2019-02-06 DIAGNOSIS — Z99.81: ICD-10-CM

## 2019-02-06 DIAGNOSIS — F41.9: ICD-10-CM

## 2019-02-06 DIAGNOSIS — T46.2X5A: ICD-10-CM

## 2019-02-06 DIAGNOSIS — T78.3XXA: ICD-10-CM

## 2019-02-06 DIAGNOSIS — J18.9: ICD-10-CM

## 2019-02-06 DIAGNOSIS — Z87.891: ICD-10-CM

## 2019-02-06 DIAGNOSIS — Z79.01: ICD-10-CM

## 2019-02-06 DIAGNOSIS — E04.2: ICD-10-CM

## 2019-02-06 DIAGNOSIS — E78.00: ICD-10-CM

## 2019-02-06 DIAGNOSIS — Z79.899: ICD-10-CM

## 2019-02-06 DIAGNOSIS — I25.10: ICD-10-CM

## 2019-02-06 DIAGNOSIS — R63.1: ICD-10-CM

## 2019-02-06 DIAGNOSIS — N39.3: ICD-10-CM

## 2019-02-06 DIAGNOSIS — I25.2: ICD-10-CM

## 2019-02-06 DIAGNOSIS — I07.1: ICD-10-CM

## 2019-02-06 DIAGNOSIS — Z87.01: ICD-10-CM

## 2019-02-06 DIAGNOSIS — R53.1: ICD-10-CM

## 2019-02-06 DIAGNOSIS — I48.0: Primary | ICD-10-CM

## 2019-02-06 DIAGNOSIS — J43.9: ICD-10-CM

## 2019-02-06 LAB
BASOPHILS # BLD AUTO: 0 K/UL (ref 0–0.2)
BASOPHILS NFR BLD: 0.2 % (ref 0–2)
BUN SERPL-MCNC: 25 MG/DL (ref 7–17)
CALCIUM SERPL-MCNC: 8.6 MG/DL (ref 8.4–10.2)
EOSINOPHIL # BLD AUTO: 0 K/UL (ref 0–0.7)
EOSINOPHIL NFR BLD: 0.3 % (ref 0–4)
ERYTHROCYTE [DISTWIDTH] IN BLOOD BY AUTOMATED COUNT: 15.8 % (ref 11.5–14.5)
GFR NON-AFRICAN AMERICAN: > 60
HGB BLD-MCNC: 11 G/DL (ref 12–16)
LYMPHOCYTES # BLD AUTO: 1.1 K/UL (ref 1–4.3)
LYMPHOCYTES NFR BLD AUTO: 24.8 % (ref 20–40)
MCH RBC QN AUTO: 27.7 PG (ref 27–31)
MCHC RBC AUTO-ENTMCNC: 32.1 G/DL (ref 33–37)
MCV RBC AUTO: 86.4 FL (ref 81–99)
MONOCYTES # BLD: 0.4 K/UL (ref 0–0.8)
MONOCYTES NFR BLD: 9.5 % (ref 0–10)
NEUTROPHILS # BLD: 2.8 K/UL (ref 1.8–7)
NEUTROPHILS NFR BLD AUTO: 65.2 % (ref 50–75)
NRBC BLD AUTO-RTO: 0.1 % (ref 0–0)
PLATELET # BLD: 165 K/UL (ref 130–400)
PMV BLD AUTO: 8.2 FL (ref 7.2–11.7)
RBC # BLD AUTO: 3.98 MIL/UL (ref 3.8–5.2)
WBC # BLD AUTO: 4.2 K/UL (ref 4.8–10.8)

## 2019-02-06 PROCEDURE — F07M6FZ THERAPEUTIC EXERCISE TREATMENT OF MUSCULOSKELETAL SYSTEM - WHOLE BODY USING ASSISTIVE, ADAPTIVE, SUPPORTIVE OR PROTECTIVE EQUIPMENT: ICD-10-PCS | Performed by: INTERNAL MEDICINE

## 2019-02-06 PROCEDURE — 3E03329 INTRODUCTION OF OTHER ANTI-INFECTIVE INTO PERIPHERAL VEIN, PERCUTANEOUS APPROACH: ICD-10-PCS | Performed by: INTERNAL MEDICINE

## 2019-02-06 RX ADMIN — PANTOPRAZOLE SODIUM SCH MG: 40 TABLET, DELAYED RELEASE ORAL at 09:08

## 2019-02-06 RX ADMIN — IPRATROPIUM BROMIDE SCH MG: 0.5 SOLUTION RESPIRATORY (INHALATION) at 15:36

## 2019-02-06 RX ADMIN — IPRATROPIUM BROMIDE SCH: 0.5 SOLUTION RESPIRATORY (INHALATION) at 00:50

## 2019-02-06 RX ADMIN — CLINDAMYCIN IN 5 PERCENT DEXTROSE SCH MLS/HR: 12 INJECTION, SOLUTION INTRAVENOUS at 00:43

## 2019-02-06 RX ADMIN — IPRATROPIUM BROMIDE SCH MG: 0.5 SOLUTION RESPIRATORY (INHALATION) at 04:54

## 2019-02-06 RX ADMIN — Medication SCH CAP: at 09:19

## 2019-02-06 RX ADMIN — IPRATROPIUM BROMIDE SCH MG: 0.5 SOLUTION RESPIRATORY (INHALATION) at 23:21

## 2019-02-06 RX ADMIN — Medication PRN MG: at 23:21

## 2019-02-06 RX ADMIN — IPRATROPIUM BROMIDE SCH MG: 0.5 SOLUTION RESPIRATORY (INHALATION) at 19:13

## 2019-02-06 RX ADMIN — ENOXAPARIN SODIUM SCH MG: 100 INJECTION SUBCUTANEOUS at 00:43

## 2019-02-06 RX ADMIN — CLINDAMYCIN IN 5 PERCENT DEXTROSE SCH MLS/HR: 12 INJECTION, SOLUTION INTRAVENOUS at 09:06

## 2019-02-06 RX ADMIN — IPRATROPIUM BROMIDE SCH MG: 0.5 SOLUTION RESPIRATORY (INHALATION) at 11:10

## 2019-02-06 RX ADMIN — IPRATROPIUM BROMIDE SCH MG: 0.5 SOLUTION RESPIRATORY (INHALATION) at 07:35

## 2019-02-06 NOTE — CP.PCM.PN
Subjective





- Date & Time of Evaluation


Date of Evaluation: 02/06/19


Time of Evaluation: 09:00





- Subjective


Subjective: 





Appears comfortble at rest.


Vital signs have been stable.


Oxygenation is okay on nasal canula.


Remains afebrile. 


Hgb has decreased by 2GM since admission.


Platelets okay, no leukocytosis.





Trace dependant edema of LEs. No cyanosis.


Neck supple and trachea midline.


No dullness on chest percussion.


Breath sounds diminished bilaterally, scattered rhonchi.


Basal rales present on the right postero-laterally.


No audible wheezes or bronchial breathing.


Heart sounds distant, regular rhythm.





Thyroid US shows nodule increased in size from prior exam.


Will discuss FNA with patient.


Continue Rx for CAP in COPD.


Plan to discharge to [facility].





Objective





- Vital Signs/Intake and Output


Vital Signs (last 24 hours): 


                                        











Temp Pulse Resp BP Pulse Ox


 


 97.5 F L  69   18   90/49 L  90 L


 


 02/06/19 08:08  02/06/19 08:08  02/06/19 08:08  02/06/19 08:08  02/06/19 08:08











- Medications


Medications: 


                               Current Medications





Amiodarone HCl (Cordarone)  200 mg PO BID [facility]


   Last Admin: 02/05/19 19:09 Dose:  200 mg


Bisoprolol Fumarate (Zebeta)  5 mg PO DAILY [facility]


   Last Admin: 02/05/19 10:12 Dose:  5 mg


Clopidogrel Bisulfate (Plavix)  75 mg PO DAILY [facility]


   Last Admin: 02/05/19 10:09 Dose:  75 mg


Enoxaparin Sodium (Lovenox)  90 mg SC Q12@0000,1200 [facility]; Protocol


   Last Admin: 02/06/19 00:43 Dose:  90 mg


Hydrocortisone Sodium Succinate (Solu-Cortef)  100 mg IV DAILY [facility]


   Last Admin: 02/05/19 10:10 Dose:  100 mg


Azithromycin 500 mg/ Sodium (Chloride)  250 mls @ 250 mls/hr IVPB DAILY [facility]; 

Protocol


   Last Admin: 02/05/19 10:13 Dose:  250 mls/hr


Clindamycin Phosphate (Cleocin)  600 mg in 50 mls @ 50 mls/hr IVPB Q8 [facility]; 

Protocol


   Last Admin: 02/06/19 00:43 Dose:  50 mls/hr


Ipratropium Bromide (Atrovent)  0.5 mg IH RQ4 [facility]


   Last Admin: 02/06/19 07:35 Dose:  0.5 mg


Isosorbide Mononitrate (Imdur Er)  30 mg PO DAILY [facility]


   Last Admin: 02/05/19 10:08 Dose:  30 mg


Lactobacillus Acidophilus (Bacid Acidophilus)  1 cap PO BID [facility]


   Last Admin: 02/05/19 17:38 Dose:  1 cap


Levalbuterol HCl (Xopenex)  1.25 mg INH RQ8 [facility]


   PRN Reason: Shortness of Breath


   Last Admin: 02/06/19 04:54 Dose:  1.25 mg


Pantoprazole Sodium (Protonix Ec Tab)  40 mg PO DAILY [facility]


   Last Admin: 02/05/19 10:10 Dose:  40 mg


Senna/Docusate Sodium (Senokot S 50 Mg-8.6 Mg)  2 tab PO HS [facility]


   Last Admin: 02/05/19 22:37 Dose:  2 tab











- Labs


Labs: 


                                        





                                 02/06/19 05:05 





                                 02/06/19 05:05 











Assessment and Plan


(1) Pneumonia


Status: Acute   





(2) Afib


Status: Acute   





(3) COPD exacerbation


Status: Chronic   





(4) Hypoxemia requiring supplemental oxygen


Status: Chronic

## 2019-02-06 NOTE — US
Date of service: 



02/05/2019



HISTORY:

2.3cm incidental thyroid mass found on neck CT



TECHNIQUE:

Sonographic evaluation of the thyroid gland.



COMPARISON:

6/24/2016



FINDINGS:



RIGHT LOBE:

Measures 5.0 x 2.6 x 2.8 cm. Heterogeneous echotexture



Nodules: Upper pole isoechoic solid nodule, 1.3 x 0.8 x 0.9 cm 

minimally enlarged compared to prior examination.  No suspicious 

sonographic features.  No other mass identified.  Previously 

identified mass in lower pole right lobe no longer evident. 



LEFT LOBE:

Measures 4.9 x 2.5 x 2.8 cm. Heterogeneous echotexture.



Nodules: Upper pole hyperechoic complex mass with some small cystic 

areas, 2.7 x 2.6 x 2.0 cm.  Previously, this upper pole mass measured 

2.4 cm in greatest dimension.  Mild interval increase in size since 

2016. 



Lower pole isoechoic solid nodule, 2.2 x 2.0 x 2.4 cm.  Previously, 

this measured 2.2 cm in greatest dimension.  Minimal interval change 

in size.  Difference may reflect difference in technique of 

measurement. 



ISTHMUS:

Measures 0.7 cm. Normal echotexture and flow.



Nodules: None



OTHER FINDINGS:

None . 



IMPRESSION:

Multinodular thyroid.  Enlarging hyperechoic mass in upper pole left 

lobe should be considered for evaluation with percutaneous biopsy.  

Minimal increase in size in right upper pole isoechoic solid nodule 

and left lower pole isoechoic solid nodule.

## 2019-02-06 NOTE — CP.PCM.DIS
Provider





- Provider


Date of Admission: 


02/02/19 19:04





Attending physician: 


Ant Booker MD





Consults: 








02/02/19 20:05


Neurology Consult Stat 


   Comment: 


   Consulting Provider: Ziyad Jasso


   Consulting Physician: Ziyad Jasso


   Reason for Consult: altered mental status post syncope





02/02/19 20:06


Pulmonology Consult Stat 


   Comment: 


   Consulting Provider: Molina Pyle


   Consulting Physician: Molina Pyle


   Reason for Consult: copd





02/02/19 23:54


Social Work Referral Routine 


   Comment: Lives alone


   Physician Instructions: 


   Reason For Exam: Lives alone





02/04/19 10:25


Cardiology Consult Routine 


   Comment: 


   Consulting Provider: Escobar Collado


   Consulting Physician: Escobar Collado


   Reason for Consult: hx afib











Time Spent in preparation of Discharge (in minutes): 60





Hospital Course





- Lab Results


Lab Results: 


                                  Micro Results





02/02/19 18:21   Blood-Venous   Blood Culture - Preliminary


                            NO GROWTH AFTER 3 DAYS


02/02/19 18:05   Blood-Venous   Blood Culture - Preliminary


                            NO GROWTH AFTER 3 DAYS


02/03/19 10:09   Urine Random   Urine Culture - Final


                            No Growth (<1,000 CFU/ML)





                             Most Recent Lab Values











WBC  4.2 K/uL (4.8-10.8)  L  02/06/19  05:05    


 


RBC  3.98 Mil/uL (3.80-5.20)   02/06/19  05:05    


 


Hgb  11.0 g/dL (12.0-16.0)  L  02/06/19  05:05    


 


Hct  34.3 % (34.0-47.0)   02/06/19  05:05    


 


MCV  86.4 fl (81.0-99.0)   02/06/19  05:05    


 


MCH  27.7 pg (27.0-31.0)   02/06/19  05:05    


 


MCHC  32.1 g/dL (33.0-37.0)  L  02/06/19  05:05    


 


RDW  15.8 % (11.5-14.5)  H  02/06/19  05:05    


 


Plt Count  165 K/uL (130-400)   02/06/19  05:05    


 


MPV  8.2 fl (7.2-11.7)   02/06/19  05:05    


 


Neut % (Auto)  65.2 % (50.0-75.0)   02/06/19  05:05    


 


Lymph % (Auto)  24.8 % (20.0-40.0)   02/06/19  05:05    


 


Mono % (Auto)  9.5 % (0.0-10.0)   02/06/19  05:05    


 


Eos % (Auto)  0.3 % (0.0-4.0)   02/06/19  05:05    


 


Baso % (Auto)  0.2 % (0.0-2.0)   02/06/19  05:05    


 


Neut # (Auto)  2.8 K/uL (1.8-7.0)   02/06/19  05:05    


 


Lymph # (Auto)  1.1 K/uL (1.0-4.3)   02/06/19  05:05    


 


Mono # (Auto)  0.4 K/uL (0.0-0.8)   02/06/19  05:05    


 


Eos # (Auto)  0.0 K/uL (0.0-0.7)   02/06/19  05:05    


 


Baso # (Auto)  0.0 K/uL (0.0-0.2)   02/06/19  05:05    


 


Neutrophils % (Manual)  92 % (42-75)  H  02/02/19  18:05    


 


Lymphocytes % (Manual)  4 % (20-50)  L  02/02/19  18:05    


 


Monocytes % (Manual)  3 % (0-10)   02/02/19  18:05    


 


Basophils % (Manual)  1 % (0-2)   02/02/19  18:05    


 


Platelet Estimate  Normal  (NORMAL)   02/02/19  18:05    


 


pCO2  50 mm/Hg (35-45)  H  02/04/19  10:25    


 


pO2  52 mm/Hg ()  L  02/04/19  10:25    


 


HCO3  26.7 mmol/L (21-28)   02/04/19  10:25    


 


ABG pH  7.37  (7.35-7.45)   02/04/19  10:25    


 


ABG Total CO2  30.4 mmol/L (22-28)  H  02/04/19  10:25    


 


ABG O2 Saturation  90.3 % (95-98)  L  02/04/19  10:25    


 


ABG Base Excess  2.7 mmol/L (-2.0-3.0)   02/04/19  10:25    


 


Chito Test  Yes   02/04/19  10:25    


 


ABG Potassium  3.0 mmol/L (3.6-5.2)  L  02/04/19  10:25    


 


VBG pH  7.40  (7.32-7.43)   02/02/19  18:00    


 


VBG pCO2  47 mmHg (40-60)   02/02/19  18:00    


 


VBG HCO3  27.0 mmol/L  02/02/19  18:00    


 


VBG Total CO2  30.5 mmol/L (22-28)  H  02/02/19  18:00    


 


VBG O2 Sat (Calc)  77.0 % (40-65)  H  02/02/19  18:00    


 


VBG Base Excess  3.5 mmol/L (0.0-2.0)  H  02/02/19  18:00    


 


VBG Potassium  4.0 mmol/L (3.6-5.2)   02/02/19  18:00    


 


A-a O2 Difference  142.0 mm/Hg  02/04/19  10:25    


 


Sodium  134.0 mmol/L (132-148)   02/04/19  10:25    


 


Chloride  104.0 mmol/L ()   02/04/19  10:25    


 


Glucose  157 mg/dL ()  H  02/04/19  10:25    


 


Lactate  0.9 mmol/L (0.7-2.1)   02/04/19  10:25    


 


FiO2  36.0 %  02/04/19  10:25    


 


Sodium  137 mmol/l (132-148)   02/06/19  05:05    


 


Potassium  3.9 MMOL/L (3.6-5.0)   02/06/19  05:05    


 


Chloride  98 mmol/L ()   02/06/19  05:05    


 


Carbon Dioxide  30 mmol/L (22-30)   02/06/19  05:05    


 


Anion Gap  13  (10-20)   02/06/19  05:05    


 


BUN  25 mg/dl (7-17)  H  02/06/19  05:05    


 


Creatinine  0.8 mg/dl (0.7-1.2)   02/06/19  05:05    


 


Est GFR ( Amer)  > 60   02/06/19  05:05    


 


Est GFR (Non-Af Amer)  > 60   02/06/19  05:05    


 


POC Glucose (mg/dL)  141 mg/dL ()  H  02/02/19  17:36    


 


Random Glucose  89 mg/dL ()   02/06/19  05:05    


 


Calcium  8.6 mg/dL (8.4-10.2)   02/06/19  05:05    


 


Total Bilirubin  0.8 mg/dl (0.2-1.3)   02/02/19  18:05    


 


AST  27 U/L (14-36)   02/02/19  18:05    


 


ALT  28 U/L (9-52)   02/02/19  18:05    


 


Alkaline Phosphatase  124 U/L ()   02/02/19  18:05    


 


Lactate Dehydrogenase  510 U/L (313-618)   02/03/19  11:53    


 


Total Creatine Kinase  302 U/L ()  H  02/03/19  06:10    


 


Troponin I  0.0460 ng/mL (0.00-0.120)   02/03/19  06:10    


 


C-React Prot High Sens  > 15.00 mg/L (1.00-3.00)  H  02/03/19  11:53    


 


Total Protein  7.0 G/DL (6.3-8.2)   02/02/19  18:05    


 


Albumin  3.9 g/dL (3.5-5.0)   02/02/19  18:05    


 


Globulin  3.1 gm/dL (2.2-3.9)   02/02/19  18:05    


 


Albumin/Globulin Ratio  1.3  (1.0-2.1)   02/02/19  18:05    


 


Triglycerides  88 mg/DL (0-149)   02/04/19  05:20    


 


Cholesterol  117 mg/dL (0-199)   02/04/19  05:20    


 


LDL Cholesterol Direct  67 mg/dL (0-129)   02/04/19  05:20    


 


HDL Cholesterol  26 MG/DL (30-70)  L  02/04/19  05:20    


 


Aldolase  5.7 U/L (<=8.1)   02/03/19  11:53    


 


TSH 3rd Generation  4.04 mIU/ML (0.46-4.68)   02/04/19  14:44    


 


Arterial Blood Potassium  3.0 mmol/L (3.6-5.2)  L  02/04/19  10:25    


 


Venous Blood Potassium  4.0 mmol/L (3.6-5.2)   02/02/19  18:00    


 


Urine Color  Yellow  (YELLOW)   02/03/19  10:09    


 


Urine Clarity  Slighty-cloudy  (Clear)   02/03/19  10:09    


 


Urine pH  5.0  (5.0-8.0)   02/03/19  10:09    


 


Ur Specific Gravity  1.027  (1.003-1.030)   02/03/19  10:09    


 


Urine Protein  Negative mg/dL (NEGATIVE)   02/03/19  10:09    


 


Urine Glucose (UA)  Neg mg/dL (NEGATIVE)   02/03/19  10:09    


 


Urine Ketones  Negative mg/dL (NEGATIVE)   02/03/19  10:09    


 


Urine Blood  Negative  (NEGATIVE)   02/03/19  10:09    


 


Urine Nitrate  Negative  (NEGATIVE)   02/03/19  10:09    


 


Urine Bilirubin  Negative  (NEGATIVE)   02/03/19  10:09    


 


Urine Urobilinogen  0.2-1.0 mg/dL (0.2-1.0)   02/03/19  10:09    


 


Ur Leukocyte Esterase  Neg Bradford/uL (Negative)   02/03/19  10:09    


 


Urine RBC (Auto)  < 1 /hpf (0-3)   02/03/19  10:09    


 


Urine Microscopic WBC  1 /hpf (0-5)   02/03/19  10:09    


 


Ur Squamous Epith Cells  2 /hpf (0-5)   02/03/19  10:09    


 


Influenza Typ A,B (EIA)  Negative for flu a/b  (NEGATIVE)   02/03/19  09:42    


 


Mycoplasma pneumon IgM  Negative  (NEGATIVE)   02/04/19  14:44    


 


Ur Strep pneumoniae Ag  Not detected  (Not Detected)   02/04/19  14:00    














- Hospital Course


Hospital Course: 





87 y.o F with hx of COPD, CAD, CHF, CAD  & paroxysmal atrial fibrillation 

admitted for evaluation of syncope. Chest CT negative for pulmonary embolism. 

Head/neck CTA showed no occlusion or stenosis. Carotid ultrasound showed no 

evidence of stenosis. Head CT showed no acute pathology. During hospital course 

RRT was called for atrial fibrillation with RVR, and spo2 of 88%. Cardizem drip 

was started along with cardizem PO for rate control, followed by amiodorone. 

Saturations increased to 92-95% after heart rate decreased. Yesterday morning, 

patient converted to sinus rhythm. 





Patient  was also found to have a right lower lobe infiltrate on chest CT.Since 

then, patient has been stable and started on  Azithromycin 500mg & Clindamycin. 

Since then, patient has been stable. 





She was seen and examined this morning. She reports having one episode of loose 

stools but denied any fever, chills, nausea, vomitting or abdominal pain. She 

states her cough is slightly improving. She will be discharged today to short 

term rehabilitation facility. 





1. Syncope (Acute )





2. Paroxysma Atrial fibrillation with RVR


-Continue Amiodarone 200mg PO QD tomorrow as per Dr. Collado. 


-Patient received AM dose of Amiodarone 200mg PO BID and will receive her 

evening dose tonight.


-Continue Eliquis 2.5mg PO BID.


-Cardizem discontinued.





3. Pneumonia (Acute)


-Continue Azithromycin 500mg (day 4) & Clindamycin 600mg IV (day 3) for 7 days.





4.  COPD with hypoxemia (Acute on chronic)


-Start Solucortef 50mg IV QD tomorrow as per Dr. Pyle.


-Continue duonebs.


-Continue Xoponex 1.25mg INH Q8PRN.


-Continue Ipratropium 0.02%, 0.5 mg IH RQ4 nebu.


-Continue supplemental ox.


-Solucortef 100mg IV QD completed today.





5. CHF (chronic)


-Continue zebeta 5mg PO QD.


-Continue Isosorbide Mononitrate 30mg PO QD


- Lisinopril discontinued because of low normal BP's (107/57, 103/64, 104/65) 

followed by BP reading of 90/49.


-Echo done 2/2/2019 showed mild concentric left ventricular hypertrophy with EF 

of 50-55%. LV diastolic function could not be assessed due to a. fib. Left 

atrium is mildly dilated; mild mitral & tricuspid valve regurg.





6. Thyroid mass


-incidental left thyroid mass 2.3cm noted on Head/neck CTA


-Thyroid ultrasound- two nodules, one noted as complex and the other as 

homogenous.


-Thyroid scan as outpatient recommended. 


-TSH:4.04 (2/4/2019)





7. hx of stent placement


-Continue Eliquis 2.5mg PO BID.


-Aspirin & plavix discontinued. 





Discharge Exam





- Head Exam


Head Exam: ATRAUMATIC





- Eye Exam


Eye Exam: EOMI





- ENT Exam


ENT Exam: Mucous Membranes Moist





- Respiratory Exam


Respiratory Exam: absent: Wheezes, Respiratory Distress, Stridor


Additional comments: 





scattered rhonchi





- Cardiovascular Exam


Cardiovascular Exam: REGULAR RHYTHM, +S1, +S2





- GI/Abdominal Exam


GI & Abdominal Exam: Normal Bowel Sounds, Soft.  absent: Firm, Guarding, 

Tenderness





- Extremities Exam


Extremities exam: pedal pulses present





- Neurological Exam


Neurological exam: Alert, Oriented x3





Discharge Plan





- Follow Up Plan


Condition: FAIR


Disposition: REHAB FACILITY/REHAB UNIT


Referrals: 


Molina Pyle MD [Family Provider] - 


Escobar Collado MD [Staff Provider] - 





Clinical Quality Measures





- CQM - Stroke


Anticoagulation Prescribed for Atrial Flutter, Atrial Fibrillation and History 

of:: Yes


Contraindication/Reason for not providing: LDL less than 70 mg/dL





- CQM - VTE


Did patient receive overlap therapy during hosptialization?: No





- CQM - Heart Failure


Ejection Fraction: 40 % or Greater


Left Ventricular Function to be assessed after discharge: No


ACE Inhibitor Prescribed: No


Contraindication/Reason for not providing: episodes of low/normal BP & 

hypotension in elderly female


Beta-Blocker Prescribed: Bisoprolol


Angiotensin II Receptor Blocker Prescribed: No


Contraindication/Reason for not providing: HF with EF of 50-55%


AnticoagulationTherapy for Atrial Fibrillation/Atrialflutter: Yes


Aldosterone Antagonist Prescribed: No


Contraindication/Reason for not providing: EF is 50-55%


Hydralazine Nitrate Prescribed: No


Contraindication/Reason for not providing: EF is 50-55%


Implantable Cardioverter Defibrillator Therapy: No


Contraindication/Reason for not providing: EF is 50-55%


Cardiac Resynchronization Therapy Prescribed: No


Contraindication/Reason for not providing: EF 50-55%


Will be discharged to: Skilled Nursing Facility





- Date & Time of Discharge Summary


Date of Discharge Summary: 02/06/19


Time of Discharge Summary: 13:00

## 2019-02-06 NOTE — CP.PCM.PN
Subjective





- Date & Time of Evaluation


Date of Evaluation: 19


Time of Evaluation: 10:24





- Subjective


Subjective: 





feeling slightly better. No recurrent atrial fibrillation





Objective





- Vital Signs/Intake and Output


Vital Signs (last 24 hours): 


                                        











Temp Pulse Resp BP Pulse Ox


 


 97.5 F L  69   18   90/49 L  90 L


 


 19 08:08  19 08:08  19 08:08  19 08:08  19 08:08











- Medications


Medications: 


                               Current Medications





Amiodarone HCl (Cordarone)  200 mg PO BID [facility]


   Last Admin: 19 19:09 Dose:  200 mg


Apixaban (Eliquis)  2.5 mg PO BID [facility]; Protocol


Bisoprolol Fumarate (Zebeta)  5 mg PO DAILY [facility]


   Last Admin: 19 09:10 Dose:  Not Given


Hydrocortisone Sodium Succinate (Solu-Cortef)  100 mg IV DAILY [facility]


   Last Admin: 19 09:09 Dose:  100 mg


Azithromycin 500 mg/ Sodium (Chloride)  250 mls @ 250 mls/hr IVPB DAILY [facility]; 

Protocol


   Last Admin: 19 09:11 Dose:  250 mls/hr


Clindamycin Phosphate (Cleocin)  600 mg in 50 mls @ 50 mls/hr IVPB Q8 [facility]; 

Protocol


   Last Admin: 19 09:06 Dose:  50 mls/hr


Ipratropium Bromide (Atrovent)  0.5 mg IH RQ4 [facility]


   Last Admin: 19 07:35 Dose:  0.5 mg


Isosorbide Mononitrate (Imdur Er)  30 mg PO DAILY [facility]


   Last Admin: 19 09:08 Dose:  30 mg


Lactobacillus Acidophilus (Bacid Acidophilus)  1 cap PO BID [facility]


   Last Admin: 19 09:19 Dose:  1 cap


Levalbuterol HCl (Xopenex)  1.25 mg INH RQ8 PRN


   PRN Reason: Shortness of Breath


   Last Admin: 19 04:54 Dose:  1.25 mg


Pantoprazole Sodium (Protonix Ec Tab)  40 mg PO DAILY [facility]


   Last Admin: 19 09:08 Dose:  40 mg


Senna/Docusate Sodium (Senokot S 50 Mg-8.6 Mg)  2 tab PO HS [facility]


   Last Admin: 19 22:37 Dose:  2 tab











- Labs


Labs: 


                                        





                                 19 05:05 





                                 19 05:05 











Assessment and Plan





- Assessment and Plan (Free Text)


Assessment: 





NSR on low dose beta blocker and amiodarone


Paroxysmal atrial fibrillation most likely due to pulmonary exacerbation


Continue Amiodarone for now Tfts are normal


Will discontinue once pulmonary status is stablized 


Will  to qd then dc

## 2019-02-07 RX ADMIN — IPRATROPIUM BROMIDE SCH MG: 0.5 SOLUTION RESPIRATORY (INHALATION) at 03:46

## 2019-02-07 RX ADMIN — IPRATROPIUM BROMIDE SCH MG: 0.5 SOLUTION RESPIRATORY (INHALATION) at 19:13

## 2019-02-07 RX ADMIN — CLINDAMYCIN IN 5 PERCENT DEXTROSE SCH MLS/HR: 12 INJECTION, SOLUTION INTRAVENOUS at 04:30

## 2019-02-07 RX ADMIN — Medication SCH CAP: at 17:07

## 2019-02-07 RX ADMIN — IPRATROPIUM BROMIDE SCH MG: 0.5 SOLUTION RESPIRATORY (INHALATION) at 11:09

## 2019-02-07 RX ADMIN — Medication PRN MG: at 22:17

## 2019-02-07 RX ADMIN — IPRATROPIUM BROMIDE SCH MG: 0.5 SOLUTION RESPIRATORY (INHALATION) at 23:31

## 2019-02-07 RX ADMIN — IPRATROPIUM BROMIDE SCH: 0.5 SOLUTION RESPIRATORY (INHALATION) at 11:40

## 2019-02-07 RX ADMIN — IPRATROPIUM BROMIDE SCH MG: 0.5 SOLUTION RESPIRATORY (INHALATION) at 07:31

## 2019-02-07 RX ADMIN — IPRATROPIUM BROMIDE SCH MG: 0.5 SOLUTION RESPIRATORY (INHALATION) at 15:40

## 2019-02-07 RX ADMIN — Medication SCH CAP: at 09:07

## 2019-02-07 RX ADMIN — PANTOPRAZOLE SODIUM SCH MG: 40 TABLET, DELAYED RELEASE ORAL at 09:11

## 2019-02-07 RX ADMIN — CLINDAMYCIN IN 5 PERCENT DEXTROSE SCH MLS/HR: 12 INJECTION, SOLUTION INTRAVENOUS at 20:55

## 2019-02-07 RX ADMIN — Medication PRN MG: at 23:31

## 2019-02-07 RX ADMIN — CLINDAMYCIN IN 5 PERCENT DEXTROSE SCH MLS/HR: 12 INJECTION, SOLUTION INTRAVENOUS at 11:15

## 2019-02-07 NOTE — CP.PCM.HP
<Lashay Quintero - Last Filed: 02/07/19 14:22>





History of Present Illness





- History of Present Illness


History of Present Illness: 





87 y.o F with hx of COPD, CAD, CHF, CAD  & paroxysmal atrial fibrillation was 

admitted for TCU for continuation of IV antibiotics and physical therapy. 

Patient's recent hospitalization was for syncope likely due to paroxysmal atrial

fibrillation given the fact that RRT was called during hospitalization for a. 

fib with RVR. 


Patient was seen and examined this morning with physical therapy. PT reported 

patient desaturated to 82% while on 3L of oxygen during functional transfer, and

stated patient needed frequent breaks. Patient reported she continues to feel 

weak, but denied any fever, chills, chest pain or shortness of breath. 





Present on Admission





- Present on Admission


Any Indicators Present on Admission: No





Past Patient History





- Infectious Disease


Hx of Infectious Diseases: None





- Tetanus Immunizations


Tetanus Immunization: Unknown





- Past Medical History & Family History


Past Medical History?: Yes





- Past Social History


Smoking Status: Never Smoked





- CARDIAC


Hx Atrial Fibrillation: Yes


Hx Congestive Heart Failure: Yes


Hx Heart Attack: Yes


Hx Hypercholesterolemia: Yes


Hx Hypertension: Yes


Hx Peripheral Edema: Yes





- PULMONARY


Hx Chronic Obstructive Pulmonary Disease (COPD): Yes


Hx Emphysema: Yes


Hx Pneumonia: Yes





- NEUROLOGICAL


Hx Syncope: Yes





- HEENT


Hx HEENT Problems: No





- RENAL


Hx Chronic Kidney Disease: No





- ENDOCRINE/METABOLIC


Hx Endocrine Disorders: No





- HEMATOLOGICAL/ONCOLOGICAL


Hx Blood Disorders: No


Hx AIDS: No


Hx Human Immunodeficiency Virus (HIV): No





- INTEGUMENTARY


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





- MUSCULOSKELETAL/RHEUMATOLOGICAL


Hx Falls: Yes


Hx Fractures: Yes (right ankle)





- GASTROINTESTINAL


Hx Gastrointestinal Disorders: No





- GENITOURINARY/GYNECOLOGICAL


Hx Incontinence: Yes


Other/Comment: Stress Incontinence





- PSYCHIATRIC


Hx Psychophysiologic Disorder: No


Hx Substance Use: No





- SURGICAL HISTORY


Hx Appendectomy: No


Hx Coronary Stent: Yes (x 2)





- ANESTHESIA


Hx Anesthesia: Yes


Hx Anesthesia Reactions: No


Hx Malignant Hyperthermia: No





Meds


Allergies/Adverse Reactions: 


                                    Allergies











Allergy/AdvReac Type Severity Reaction Status Date / Time


 


Penicillins Allergy Mild SWELLING Verified 02/06/19 15:47














Physical Exam





- Constitutional


Appears: Non-toxic





- Head Exam


Head Exam: ATRAUMATIC





- Eye Exam


Eye Exam: EOMI





- ENT Exam


ENT Exam: Mucous Membranes Moist





- Respiratory Exam


Respiratory Exam: Rhonchi.  absent: Respiratory Distress





- Cardiovascular Exam


Cardiovascular Exam: REGULAR RHYTHM, +S1, +S2





- GI/Abdominal Exam


GI & Abdominal Exam: Normal Bowel Sounds, Soft.  absent: Tenderness





- Extremities Exam


Extremities exam: Negative for: calf tenderness





- Neurological Exam


Neurological exam: Alert, Oriented x3





- Psychiatric Exam


Psychiatric exam: Normal Affect, Normal Mood





Results





- Vital Signs


Recent Vital Signs: 





                                Last Vital Signs











Temp      


 


Pulse  74   02/06/19 21:54


 


Resp  19   02/06/19 17:30


 


BP  140/75   02/06/19 21:54


 


Pulse Ox      














Assessment & Plan





- Assessment and Plan (Free Text)


Assessment: 


87 y.o F with hx of COPD, CAD, CHF, CAD  & paroxysmal atrial fibrillation was 

admitted for TCU for continuation of IV antibiotics and physical therapy. 





1. Syncope (Acute, resolved )





2. Paroxysma Atrial fibrillation with RVR (Acute on chronic)


-Continue Amiodarone 200mg PO QD  as per Dr. Collado. 


-Continue Eliquis 2.5mg PO BID. 





3. Pneumonia (Acute)


-Continue Azithromycin 500mg (day 5) & Clindamycin 600mg IV (day 4) for 7 days.





4. COPD with hypoxemia (Acute on chronic)


-Continue Solucortef 50mg IV QD.


-Continue Xoponex 1.25mg INH Q8PRN.


-Continue Ipratropium 0.02%, 0.5 mg IH RQ4 nebu.


-Continue supplemental ox.





5. CHF (chronic)


-Continue zebeta 5mg PO QD.


-Continue Isosorbide Mononitrate 30mg PO QD


-Echo done 2/2/2019 showed mild concentric left ventricular hypertrophy with EF 

of 50-55%. LV diastolic function could not be assessed due to a. fib. Left 

atrium is mildly dilated; mild mitral & tricuspid valve regurg.





6. Thyroid mass


-incidental left thyroid mass 2.3cm noted on Head/neck CTA


-Thyroid ultrasound- two nodules, one noted as complex and the other as 

homogenous.


-Thyroid scan as outpatient recommended. 


-TSH:4.04 (2/4/2019)





7. hx of stent placement


-Continue Eliquis 2.5mg PO BID.








<Ant Booker D - Last Filed: 02/07/19 18:05>





Results





- Vital Signs


Recent Vital Signs: 





                                Last Vital Signs











Temp  97.7 F   02/07/19 16:09


 


Pulse  62   02/07/19 17:29


 


Resp  20   02/07/19 16:09


 


BP  155/76 H  02/07/19 17:29


 


Pulse Ox  94 L  02/07/19 16:09














Attending/Attestation





- Attestation


I have personally seen and examined this patient.: Yes


I have fully participated in the care of the patient.: Yes


I have reviewed all pertinent clinical information: Yes


Notes (Text): 





02/07/19 18:04


Patient seen and examined with resident. Case discussed and agreed with 

assessment and plan of management.

## 2019-02-07 NOTE — CP.PCM.CON
History of Present Illness





- History of Present Illness


History of Present Illness: 





This 87 year old female is known to me from the outpatient setting as well as 

the recent hospitalization for exacerbation of COPD, RLL pneumonia and 

paroxysmal atrial fibrillation. She has significant COPD and is using home 

oxygen overnight and PRN during the daytime. She is a former heavy cigarette 

smoker who also had extensive secondhand exposure as well. She had improved 

mental status and returned to [facility] with medical treatments. She continues to 

receive antibiotic therapy for her pneumonia as well as the COPD, but has 

significant debilitation requiring sub acute rehabilitation.





Past Patient History





- Infectious Disease


Hx of Infectious Diseases: None





- Tetanus Immunizations


Tetanus Immunization: Unknown





- Past Medical History & Family History


Past Medical History?: Yes





- Past Social History


Smoking Status: Former Smoker


Chewing Tobacco Use: No


Cigar Use: No


Alcohol: Social


Drugs: Denies


Home Situation {Lives}: Alone





- CARDIAC


Hx Atrial Fibrillation: Yes


Hx Congestive Heart Failure: Yes


Hx Heart Attack: Yes


Hx Hypercholesterolemia: Yes


Hx Hypertension: Yes


Hx Peripheral Edema: Yes





- PULMONARY


Hx Chronic Obstructive Pulmonary Disease (COPD): Yes


Hx Emphysema: Yes


Hx Pneumonia: Yes





- NEUROLOGICAL


Hx Syncope: Yes


Other/Comment: AMS on recent admission.





- HEENT


Hx HEENT Problems: No





- RENAL


Hx Chronic Kidney Disease: No





- ENDOCRINE/METABOLIC


Other/Comment: Multinodular thyroid.





- HEMATOLOGICAL/ONCOLOGICAL


Hx Blood Disorders: No


Hx Human Immunodeficiency Virus (HIV): No





- INTEGUMENTARY


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





- MUSCULOSKELETAL/RHEUMATOLOGICAL


Hx Falls: Yes


Hx Fractures: Yes (right ankle)





- GASTROINTESTINAL


Hx Gastrointestinal Disorders: No





- GENITOURINARY/GYNECOLOGICAL


Hx Incontinence: Yes


Other/Comment: Stress Incontinence





- PSYCHIATRIC


Hx Psychophysiologic Disorder: No


Hx Substance Use: No





- SURGICAL HISTORY


Hx Coronary Stent: Yes (x 2)





- ANESTHESIA


Hx Anesthesia: Yes


Hx Anesthesia Reactions: No


Hx Malignant Hyperthermia: No





Meds


Allergies/Adverse Reactions: 


                                    Allergies











Allergy/AdvReac Type Severity Reaction Status Date / Time


 


Penicillins Allergy Mild SWELLING Verified 02/06/19 15:47














- Medications


Medications: 


                               Current Medications





Amiodarone HCl (Cordarone)  200 mg PO DAILY [facility]


   Last Admin: 02/07/19 09:10 Dose:  200 mg


Apixaban (Eliquis)  5 mg PO BID [facility]; Protocol


   Last Admin: 02/07/19 09:10 Dose:  5 mg


Bisoprolol Fumarate (Zebeta)  5 mg PO DAILY [facility]


   Last Admin: 02/07/19 09:12 Dose:  5 mg


Hydrocortisone Sodium Succinate (Solu-Cortef)  50 mg IV DAILY [facility]


   Last Admin: 02/07/19 09:11 Dose:  50 mg


Azithromycin 500 mg/ Sodium (Chloride)  250 mls @ 250 mls/hr IVPB DAILY@0600 

[facility]; Protocol


   Last Admin: 02/07/19 05:37 Dose:  250 mls/hr


Clindamycin Phosphate (Cleocin)  600 mg in 50 mls @ 50 mls/hr IVPB 

Q8@0400,1200,2000 [facility]; Protocol


   Last Admin: 02/07/19 11:15 Dose:  50 mls/hr


Ipratropium Bromide (Atrovent)  0.5 mg IH RQ4 [facility]


Isosorbide Mononitrate (Imdur Er)  30 mg PO DAILY [facility]


   Last Admin: 02/07/19 09:11 Dose:  30 mg


Lactobacillus Acidophilus (Bacid Acidophilus)  1 cap PO BID [facility]


   Last Admin: 02/07/19 09:07 Dose:  1 cap


Levalbuterol HCl (Xopenex)  1.25 mg INH RQ8 PRN


   PRN Reason: Shortness of Breath


   Last Admin: 02/06/19 23:21 Dose:  1.25 mg


Pantoprazole Sodium (Protonix Ec Tab)  40 mg PO DAILY [facility]


   Last Admin: 02/07/19 09:11 Dose:  40 mg











Physical Exam





- Additional Findings


Additional findings: 





Chronically ill-appearing female in no acute distress sitting up in the bed..


Awake and oriented x3.  Cooperative with physical examination.


Pharynx is pink and mucous membranes are moist without exudate.


Nasal passages are patent bilaterally without bleeding or exudate.


Conjunctivae are pink and there is no scleral icterus.


The neck is supple her trachea is midline.  No carotid bruit.


Increased AP diameter of the thorax.  No dullness on chest percussion.


Breath sounds are diminished bilaterally without audible wheezing or bronchial 

breathing.


Dry rales are heard in the lower lobes posteriorly.  Occasional scattered 

rhonchi in dependant regions of both lungs.


Heart sounds are distant, regular rhythm.


Abdomen is soft, fleshy and nontender with normal bowel sounds.


No dependent edema , no cyanosis.  No calf tenderness.





Results





- Vital Signs


Recent Vital Signs: 


                                Last Vital Signs











Temp  98.4 F   02/07/19 07:56


 


Pulse  77   02/07/19 09:10


 


Resp  18   02/07/19 07:56


 


BP  153/75 H  02/07/19 09:10


 


Pulse Ox  94 L  02/07/19 07:56














- Labs


Result Diagrams: 


                                 02/08/19 05:30





                                 02/08/19 05:30





Assessment & Plan


(1) Afib


Status: Resolved   Priority: High   





(2) Multinodular thyroid


Assessment and Plan: 


Needs FNA.


Status: Chronic   Priority: High   





(3) Pneumonia


Status: Acute   Priority: High   





(4) COPD exacerbation


Status: Chronic   Priority: High   





(5) Hypoxemia requiring supplemental oxygen


Status: Chronic   Priority: High   





- Assessment and Plan (Free Text)


Plan: 





Continue treatment of pneumonia and COPD/hypoxemia.





- Date & Time


Date: 02/07/19


Time: 11:25

## 2019-02-08 LAB
BUN SERPL-MCNC: 11 MG/DL (ref 7–17)
CALCIUM SERPL-MCNC: 8.6 MG/DL (ref 8.4–10.2)
ERYTHROCYTE [DISTWIDTH] IN BLOOD BY AUTOMATED COUNT: 15.5 % (ref 11.5–14.5)
GFR NON-AFRICAN AMERICAN: > 60
HGB BLD-MCNC: 11.5 G/DL (ref 12–16)
MCH RBC QN AUTO: 27.6 PG (ref 27–31)
MCHC RBC AUTO-ENTMCNC: 31.8 G/DL (ref 33–37)
MCV RBC AUTO: 86.8 FL (ref 81–99)
PLATELET # BLD: 161 K/UL (ref 130–400)
RBC # BLD AUTO: 4.17 MIL/UL (ref 3.8–5.2)
WBC # BLD AUTO: 4.8 K/UL (ref 4.8–10.8)

## 2019-02-08 RX ADMIN — CLINDAMYCIN IN 5 PERCENT DEXTROSE SCH MLS/HR: 12 INJECTION, SOLUTION INTRAVENOUS at 04:57

## 2019-02-08 RX ADMIN — IPRATROPIUM BROMIDE SCH MG: 0.5 SOLUTION RESPIRATORY (INHALATION) at 11:07

## 2019-02-08 RX ADMIN — LEVALBUTEROL SCH MG: 0.63 SOLUTION RESPIRATORY (INHALATION) at 15:49

## 2019-02-08 RX ADMIN — IPRATROPIUM BROMIDE SCH MG: 0.5 SOLUTION RESPIRATORY (INHALATION) at 04:34

## 2019-02-08 RX ADMIN — IPRATROPIUM BROMIDE SCH MG: 0.5 SOLUTION RESPIRATORY (INHALATION) at 15:39

## 2019-02-08 RX ADMIN — IPRATROPIUM BROMIDE SCH MG: 0.5 SOLUTION RESPIRATORY (INHALATION) at 19:25

## 2019-02-08 RX ADMIN — PANTOPRAZOLE SODIUM SCH MG: 40 TABLET, DELAYED RELEASE ORAL at 08:26

## 2019-02-08 RX ADMIN — Medication SCH CAP: at 16:51

## 2019-02-08 RX ADMIN — LEVALBUTEROL SCH MG: 0.63 SOLUTION RESPIRATORY (INHALATION) at 11:08

## 2019-02-08 RX ADMIN — IPRATROPIUM BROMIDE SCH MG: 0.5 SOLUTION RESPIRATORY (INHALATION) at 07:04

## 2019-02-08 RX ADMIN — Medication SCH CAP: at 08:28

## 2019-02-08 RX ADMIN — LEVALBUTEROL SCH MG: 0.63 SOLUTION RESPIRATORY (INHALATION) at 19:25

## 2019-02-08 RX ADMIN — CLINDAMYCIN IN 5 PERCENT DEXTROSE SCH MLS/HR: 12 INJECTION, SOLUTION INTRAVENOUS at 20:49

## 2019-02-08 RX ADMIN — CLINDAMYCIN IN 5 PERCENT DEXTROSE SCH MLS/HR: 12 INJECTION, SOLUTION INTRAVENOUS at 11:41

## 2019-02-08 NOTE — CP.PCM.PN
Subjective





- Date & Time of Evaluation


Date of Evaluation: 02/08/19


Time of Evaluation: 09:31





- Subjective


Subjective: 





Continues to have episodes of wheezing and dyspnea.


Requires repeated PRN aerosol treatments, especially overnight.


Has remained in normal sinus rhythm mow and SpO2 93% on nasal canula at 2.5LPM.


Will try atrovent with low dose levalbuterol on a scheduled basis QID plus 

levalbuterol PRN as well.


Hopefully this will not precipitate repeated a fib.


Would NOT reduce hydrocortisone today as originally planned.





Objective





- Vital Signs/Intake and Output


Vital Signs (last 24 hours): 


                                        











Temp Pulse Resp BP Pulse Ox


 


 98.2 F   70   18   169/78 H  95 


 


 02/08/19 08:03  02/08/19 08:27  02/08/19 08:03  02/08/19 08:27  02/08/19 08:03











- Medications


Medications: 


                               Current Medications





Amiodarone HCl (Cordarone)  200 mg PO DAILY [facility]


   Last Admin: 02/08/19 08:26 Dose:  200 mg


Apixaban (Eliquis)  5 mg PO BID [facility]; Protocol


   Last Admin: 02/08/19 08:26 Dose:  5 mg


Azithromycin (Zithromax)  500 mg PO DAILY@0600 [facility]


   Last Admin: 02/08/19 06:29 Dose:  500 mg


Bisoprolol Fumarate (Zebeta)  5 mg PO DAILY [facility]


   Last Admin: 02/08/19 08:26 Dose:  5 mg


Hydrocortisone Sodium Succinate (Solu-Cortef)  50 mg IV DAILY [facility]


   Last Admin: 02/08/19 08:28 Dose:  50 mg


Clindamycin Phosphate (Cleocin)  600 mg in 50 mls @ 50 mls/hr IVPB 

Q8@0400,1200,2000 [facility]; Protocol


   Last Admin: 02/08/19 04:57 Dose:  50 mls/hr


Ipratropium Bromide (Atrovent)  0.5 mg IH RQID [facility]


Isosorbide Mononitrate (Imdur Er)  30 mg PO DAILY [facility]


   Last Admin: 02/08/19 08:26 Dose:  30 mg


Lactobacillus Acidophilus (Bacid Acidophilus)  1 cap PO BID [facility]


   Last Admin: 02/08/19 08:28 Dose:  1 cap


Levalbuterol HCl (Xopenex)  0.63 mg INH RQID [facility]


Levalbuterol HCl (Xopenex)  1.25 mg INH RQ4 PRN


   PRN Reason: Shortness of Breath


Lisinopril (Zestril)  2.5 mg PO DAILY [facility]


   Last Admin: 02/08/19 08:27 Dose:  2.5 mg


Pantoprazole Sodium (Protonix Ec Tab)  40 mg PO DAILY [facility]


   Last Admin: 02/08/19 08:26 Dose:  40 mg











- Labs


Labs: 


                                        





                                 02/08/19 05:30 





                                 02/08/19 05:30 











Assessment and Plan


(1) Afib


Status: Acute   





(2) Multinodular thyroid


Status: Chronic   





(3) Pneumonia


Status: Acute   





(4) COPD exacerbation


Status: Chronic   





(5) Hypoxemia requiring supplemental oxygen


Status: Chronic

## 2019-02-09 LAB
ALBUMIN SERPL-MCNC: 3.4 G/DL (ref 3.5–5)
ALBUMIN/GLOB SERPL: 1.1 {RATIO} (ref 1–2.1)
ALT SERPL-CCNC: 52 U/L (ref 9–52)
AST SERPL-CCNC: 33 U/L (ref 14–36)
BILIRUBIN,DIRECT: 0 MG/ML (ref 0–0.4)
T3: 0.79 NMOL/L (ref 1.49–2.6)
T4 SERPL-MCNC: 7.59 UG/DL (ref 5.5–11)

## 2019-02-09 RX ADMIN — LEVALBUTEROL SCH MG: 0.63 SOLUTION RESPIRATORY (INHALATION) at 19:24

## 2019-02-09 RX ADMIN — IPRATROPIUM BROMIDE SCH MG: 0.5 SOLUTION RESPIRATORY (INHALATION) at 19:24

## 2019-02-09 RX ADMIN — LEVALBUTEROL SCH MG: 0.63 SOLUTION RESPIRATORY (INHALATION) at 15:21

## 2019-02-09 RX ADMIN — LEVALBUTEROL SCH MG: 0.63 SOLUTION RESPIRATORY (INHALATION) at 07:11

## 2019-02-09 RX ADMIN — CLINDAMYCIN IN 5 PERCENT DEXTROSE SCH MLS/HR: 12 INJECTION, SOLUTION INTRAVENOUS at 04:32

## 2019-02-09 RX ADMIN — Medication SCH CAP: at 16:31

## 2019-02-09 RX ADMIN — IPRATROPIUM BROMIDE SCH MG: 0.5 SOLUTION RESPIRATORY (INHALATION) at 07:11

## 2019-02-09 RX ADMIN — PANTOPRAZOLE SODIUM SCH MG: 40 TABLET, DELAYED RELEASE ORAL at 08:13

## 2019-02-09 RX ADMIN — LEVALBUTEROL SCH MG: 0.63 SOLUTION RESPIRATORY (INHALATION) at 11:23

## 2019-02-09 RX ADMIN — Medication SCH CAP: at 12:57

## 2019-02-09 RX ADMIN — Medication PRN MG: at 04:20

## 2019-02-09 RX ADMIN — IPRATROPIUM BROMIDE SCH MG: 0.5 SOLUTION RESPIRATORY (INHALATION) at 15:21

## 2019-02-09 RX ADMIN — IPRATROPIUM BROMIDE SCH MG: 0.5 SOLUTION RESPIRATORY (INHALATION) at 11:23

## 2019-02-09 NOTE — CP.PCM.PN
Subjective





- Date & Time of Evaluation


Date of Evaluation: 02/09/19


Time of Evaluation: 12:08





- Subjective


Subjective: 





Uncomfortable, did not sleep well last night.


Developed hypertensive episode and anxiety overnight.


Complaining of excessive heat in the room.


Excessive thirst and urinary incontinence.





No dependant edema. Extrems are pink and warm.


No cyabnosis or calf tenderness.


Pharynx is pink, dry mucosa, no exudate.


Neck is supple and trachea midline.


No dullness on chest percussion.


Breath sounds are markedly diminished bilaterally.


No audible wheezes. Dry rales in lower lobes bilaterally.


Heart sounds are distant, rhythm regular.





D/C hydrocortisone.


Check LFTs and thyroid function.


Accuchecks daily w/o coverage.





Objective





- Vital Signs/Intake and Output


Vital Signs (last 24 hours): 


                                        











Temp Pulse Resp BP Pulse Ox


 


 98.1 F   71   20   179/84 H  94 L


 


 02/09/19 10:10  02/09/19 10:10  02/09/19 10:10  02/09/19 10:10  02/09/19 10:10











- Medications


Medications: 


                               Current Medications





Amiodarone HCl (Cordarone)  200 mg PO DAILY [facility]


   Last Admin: 02/09/19 08:13 Dose:  200 mg


Apixaban (Eliquis)  5 mg PO BID [facility]; Protocol


   Last Admin: 02/09/19 08:12 Dose:  5 mg


Azithromycin (Zithromax)  500 mg PO DAILY@0600 [facility]


   Last Admin: 02/09/19 06:28 Dose:  500 mg


Bisoprolol Fumarate (Zebeta)  5 mg PO DAILY [facility]


   Last Admin: 02/08/19 08:26 Dose:  5 mg


Ipratropium Bromide (Atrovent)  0.5 mg IH RQID [facility]


   Last Admin: 02/09/19 11:23 Dose:  0.5 mg


Isosorbide Mononitrate (Imdur Er)  30 mg PO DAILY [facility]


   Last Admin: 02/09/19 08:12 Dose:  30 mg


Lactobacillus Acidophilus (Bacid Acidophilus)  1 cap PO BID [facility]


   Last Admin: 02/08/19 16:51 Dose:  1 cap


Levalbuterol HCl (Xopenex)  0.63 mg INH RQID [facility]


   Last Admin: 02/09/19 11:23 Dose:  0.63 mg


Levalbuterol HCl (Xopenex)  1.25 mg INH RQ4 PRN


   PRN Reason: Shortness of Breath


   Last Admin: 02/09/19 04:20 Dose:  1.25 mg


Lisinopril (Zestril)  5 mg PO DAILY [facility]


   Last Admin: 02/09/19 08:12 Dose:  5 mg


Pantoprazole Sodium (Protonix Ec Tab)  40 mg PO DAILY [facility]


   Last Admin: 02/09/19 08:13 Dose:  40 mg











- Labs


Labs: 


                                        





                                 02/08/19 05:30 





                                 02/08/19 05:30 











Assessment and Plan


(1) Afib


Status: Resolved   





(2) Multinodular thyroid


Status: Chronic   





(3) Pneumonia


Status: Acute   





(4) COPD exacerbation


Status: Chronic   





(5) Hypoxemia requiring supplemental oxygen


Status: Chronic

## 2019-02-10 RX ADMIN — Medication SCH CAP: at 09:12

## 2019-02-10 RX ADMIN — IPRATROPIUM BROMIDE SCH MG: 0.5 SOLUTION RESPIRATORY (INHALATION) at 11:11

## 2019-02-10 RX ADMIN — Medication SCH CAP: at 16:13

## 2019-02-10 RX ADMIN — PANTOPRAZOLE SODIUM SCH MG: 40 TABLET, DELAYED RELEASE ORAL at 09:08

## 2019-02-10 RX ADMIN — IPRATROPIUM BROMIDE SCH MG: 0.5 SOLUTION RESPIRATORY (INHALATION) at 07:08

## 2019-02-10 RX ADMIN — LEVALBUTEROL SCH MG: 0.63 SOLUTION RESPIRATORY (INHALATION) at 19:06

## 2019-02-10 RX ADMIN — LEVALBUTEROL SCH: 0.63 SOLUTION RESPIRATORY (INHALATION) at 15:08

## 2019-02-10 RX ADMIN — IPRATROPIUM BROMIDE SCH MG: 0.5 SOLUTION RESPIRATORY (INHALATION) at 19:06

## 2019-02-10 RX ADMIN — LEVALBUTEROL SCH MG: 0.63 SOLUTION RESPIRATORY (INHALATION) at 07:08

## 2019-02-10 RX ADMIN — IPRATROPIUM BROMIDE SCH: 0.5 SOLUTION RESPIRATORY (INHALATION) at 15:08

## 2019-02-10 RX ADMIN — LEVALBUTEROL SCH MG: 0.63 SOLUTION RESPIRATORY (INHALATION) at 11:11

## 2019-02-11 RX ADMIN — Medication SCH CAP: at 08:50

## 2019-02-11 RX ADMIN — IPRATROPIUM BROMIDE SCH MG: 0.5 SOLUTION RESPIRATORY (INHALATION) at 19:11

## 2019-02-11 RX ADMIN — LEVALBUTEROL SCH MG: 0.63 SOLUTION RESPIRATORY (INHALATION) at 15:25

## 2019-02-11 RX ADMIN — IPRATROPIUM BROMIDE SCH MG: 0.5 SOLUTION RESPIRATORY (INHALATION) at 15:25

## 2019-02-11 RX ADMIN — LEVALBUTEROL SCH MG: 0.63 SOLUTION RESPIRATORY (INHALATION) at 19:11

## 2019-02-11 RX ADMIN — LEVALBUTEROL SCH MG: 0.63 SOLUTION RESPIRATORY (INHALATION) at 07:30

## 2019-02-11 RX ADMIN — IPRATROPIUM BROMIDE SCH MG: 0.5 SOLUTION RESPIRATORY (INHALATION) at 07:30

## 2019-02-11 RX ADMIN — LEVALBUTEROL SCH MG: 0.63 SOLUTION RESPIRATORY (INHALATION) at 11:02

## 2019-02-11 RX ADMIN — PANTOPRAZOLE SODIUM SCH MG: 40 TABLET, DELAYED RELEASE ORAL at 08:50

## 2019-02-11 RX ADMIN — IPRATROPIUM BROMIDE SCH MG: 0.5 SOLUTION RESPIRATORY (INHALATION) at 11:02

## 2019-02-12 RX ADMIN — GUAIFENESIN PRN MG: 100 SOLUTION ORAL at 21:09

## 2019-02-12 RX ADMIN — IPRATROPIUM BROMIDE SCH MG: 0.5 SOLUTION RESPIRATORY (INHALATION) at 07:26

## 2019-02-12 RX ADMIN — LEVALBUTEROL SCH MG: 0.63 SOLUTION RESPIRATORY (INHALATION) at 07:26

## 2019-02-12 RX ADMIN — LEVALBUTEROL SCH MG: 0.63 SOLUTION RESPIRATORY (INHALATION) at 11:07

## 2019-02-12 RX ADMIN — IPRATROPIUM BROMIDE SCH MG: 0.5 SOLUTION RESPIRATORY (INHALATION) at 11:07

## 2019-02-12 RX ADMIN — IPRATROPIUM BROMIDE SCH MG: 0.5 SOLUTION RESPIRATORY (INHALATION) at 20:45

## 2019-02-12 RX ADMIN — PANTOPRAZOLE SODIUM SCH MG: 40 TABLET, DELAYED RELEASE ORAL at 08:07

## 2019-02-12 RX ADMIN — IPRATROPIUM BROMIDE SCH MG: 0.5 SOLUTION RESPIRATORY (INHALATION) at 15:45

## 2019-02-12 RX ADMIN — LEVALBUTEROL SCH MG: 0.63 SOLUTION RESPIRATORY (INHALATION) at 20:45

## 2019-02-12 RX ADMIN — LEVALBUTEROL SCH MG: 0.63 SOLUTION RESPIRATORY (INHALATION) at 15:45

## 2019-02-12 NOTE — CP.PCM.PN
Subjective





- Date & Time of Evaluation


Date of Evaluation: 02/12/19


Time of Evaluation: 11:30





- Subjective


Subjective: 


Patient seen and examined  . Sitting in chair, comfortable, feeling better 

.Participating with PT 


No acute issues overnight 


Breathing improved 





Objective





- Vital Signs/Intake and Output


Vital Signs (last 24 hours): 


                                        











Temp Pulse Resp BP Pulse Ox


 


 98.6 F   64   18   166/70 H  98 


 


 02/12/19 07:40  02/12/19 08:06  02/12/19 08:06  02/12/19 08:06  02/12/19 07:40











- Medications


Medications: 


                               Current Medications





Apixaban (Eliquis)  5 mg PO BID [facility]; Protocol


   Last Admin: 02/12/19 08:05 Dose:  5 mg


Bisoprolol Fumarate (Zebeta)  5 mg PO DAILY [facility]


   Last Admin: 02/12/19 08:05 Dose:  5 mg


Diltiazem HCl (Cardizem)  30 mg PO TID [facility]


   Last Admin: 02/12/19 08:06 Dose:  30 mg


Ipratropium Bromide (Atrovent)  0.5 mg IH RQID [facility]


   Last Admin: 02/12/19 07:26 Dose:  0.5 mg


Isosorbide Mononitrate (Imdur Er)  30 mg PO DAILY [facility]


   Last Admin: 02/12/19 08:07 Dose:  30 mg


Levalbuterol HCl (Xopenex)  0.63 mg INH RQID [facility]


   Last Admin: 02/12/19 07:26 Dose:  0.63 mg


Levalbuterol HCl (Xopenex)  1.25 mg INH RQ4 PRN


   PRN Reason: Shortness of Breath


   Last Admin: 02/09/19 04:20 Dose:  1.25 mg


Lisinopril (Zestril)  10 mg PO DAILY [facility]


   Last Admin: 02/12/19 08:05 Dose:  10 mg


Nystatin (Nystop Topical Powder)  1 applic TOP TID [facility]


   Last Admin: 02/12/19 08:05 Dose:  1 applic


Pantoprazole Sodium (Protonix Ec Tab)  40 mg PO DAILY [facility]


   Last Admin: 02/12/19 08:07 Dose:  40 mg











- Labs


Labs: 


                                        





                                 02/08/19 05:30 





                                 02/08/19 05:30 











- Constitutional


Appears: Non-toxic, No Acute Distress, Other (overweight )





- Head Exam


Head Exam: ATRAUMATIC, NORMOCEPHALIC





- Eye Exam


Eye Exam: EOMI, Normal appearance, PERRL


Pupil Exam: NORMAL ACCOMODATION





- ENT Exam


ENT Exam: Mucous Membranes Moist, Normal Exam





- Neck Exam


Neck Exam: Full ROM, Normal Inspection





- Respiratory Exam


Respiratory Exam: NORMAL BREATHING PATTERN.  absent: Accessory Muscle Use, 

Rhonchi, Wheezes, Respiratory Distress





- Cardiovascular Exam


Cardiovascular Exam: Irregular Rhythm.  absent: JVD





- GI/Abdominal Exam


GI & Abdominal Exam: Soft, Normal Bowel Sounds.  absent: Distended, Guarding, 

Tenderness, Rebound





- Rectal Exam


Rectal Exam: Deferred





- Extremities Exam


Extremities Exam: Normal Capillary Refill, Normal Inspection





- Neurological Exam


Neurological Exam: Alert, Awake, CN II-XII Intact





- Psychiatric Exam


Psychiatric exam: Normal Affect, Normal Mood





- Skin


Skin Exam: Dry, Warm





Assessment and Plan





- Assessment and Plan (Free Text)


Assessment: 


86 y/o F with PMH  of COPD, CAD, CHF, CAD  & paroxysmal atrial fibrillation was 

admitted for TCU for continuation of IV antibiotics and physical therapy. 


She was admitted recently in telemetry with syncopy , Afib with RVR  COPD exac

erbation with CAP and hypoxemia. Once stabilized she wsa transferred to TCu for 

PT and continuation of antibiotics


At present doing well, back in SR, participating with PT








1. Generalized weakness 


continue PT 





2. Angioedema


suspected  medication side effect so Amiodarone  was discontinued 


resolved 





3.Paroxysmal Afib 


back in SR 


cardiology was consulted 


Discontinued Amiodarone and restarted cardizem PO 


Continue eliquis 5 mg po bid 





4.CAP 


treated with IV antibiotics


pulmonary on consulted discontinued Clindamycin and Zithromycin 








5. COPD with hypoxemia


pulmonary on cosnult 


Continue O2 via NC. Patient uses home O2 12 hours / day


on Solucortef , Xopenex 








6. CHF , compensated ,EF 55 %


continue zebeta 





7.CAD - stable


history of stent


continue eliquis ( discontinued ASA and Plavix after discussion with cardiology 

)








8. Thyroid nodules - outpatient work up 


incidental thyroid mass 2.3 cm noted on CT neck 


TSH 4


outpatient work up recommended 





9. Hypertension 


labile 


increase Lisinopril from 10 to 20 mg po QD 


Continue Zebeta 








10.DVt prophylaxis


on eliquis

## 2019-02-13 RX ADMIN — IPRATROPIUM BROMIDE SCH MG: 0.5 SOLUTION RESPIRATORY (INHALATION) at 19:09

## 2019-02-13 RX ADMIN — IPRATROPIUM BROMIDE SCH: 0.5 SOLUTION RESPIRATORY (INHALATION) at 15:39

## 2019-02-13 RX ADMIN — GUAIFENESIN PRN MG: 100 SOLUTION ORAL at 05:47

## 2019-02-13 RX ADMIN — LEVALBUTEROL SCH MG: 0.63 SOLUTION RESPIRATORY (INHALATION) at 19:09

## 2019-02-13 RX ADMIN — LEVALBUTEROL SCH MG: 0.63 SOLUTION RESPIRATORY (INHALATION) at 07:40

## 2019-02-13 RX ADMIN — LEVALBUTEROL SCH: 0.63 SOLUTION RESPIRATORY (INHALATION) at 15:39

## 2019-02-13 RX ADMIN — IPRATROPIUM BROMIDE SCH MG: 0.5 SOLUTION RESPIRATORY (INHALATION) at 11:00

## 2019-02-13 RX ADMIN — PANTOPRAZOLE SODIUM SCH MG: 40 TABLET, DELAYED RELEASE ORAL at 08:40

## 2019-02-13 RX ADMIN — GUAIFENESIN PRN MG: 100 SOLUTION ORAL at 17:10

## 2019-02-13 RX ADMIN — LEVALBUTEROL SCH MG: 0.63 SOLUTION RESPIRATORY (INHALATION) at 11:00

## 2019-02-13 RX ADMIN — IPRATROPIUM BROMIDE SCH MG: 0.5 SOLUTION RESPIRATORY (INHALATION) at 07:40

## 2019-02-13 NOTE — CON
DATE:  02/13/2019



ENDOCRINOLOGY CONSULTATION



LOCATION:  Room 707, [facility].



HISTORY OF PRESENT ILLNESS:  This is a 87-year-old female with known

history of multinodular goiter, currently on no levothyroxine medications

as noted, is being referred now for endocrine evaluation and management.



PAST MEDICAL HISTORY:  History of coronary artery disease with previous

coronary stent placement, history of recent exacerbation of COPD with

ongoing IV antibiotics for acute pneumonitis, history of previous

admissions for congestive heart failure, history of coronary artery disease

with prior coronary stent placements, history of paroxysmal atrial

fibrillation and generalized body weakness with episodic bouts of

near-syncopal and syncopal episodes.



FAMILY HISTORY:  Positive for hypertension and heart disease.



SOCIAL HISTORY:  The patient has a supportive family.  No known substance

use.



REVIEW OF SYSTEMS:  As mentioned above.  Admits to generalized body

weakness with episodic bouts of dizziness and lightheadedness and bifrontal

headaches, worse on the day of admission.  No chest pains, but admits to

progressive shortness of breath initially on exertion and then at rest with

paroxysmal nocturnal dyspnea.  Her oral intake has been variable with

nausea and dyspepsia and vague upper abdominal pains.  Also admits to

habitual constipation.



PHYSICAL EXAMINATION:

GENERAL:  An average-built female, in no apparent distress.

VITAL SIGNS:  Blood pressure of 140/80, pulse of 70 beats per minute and

regular, temperature 98, respirations 20.  Height is 5 feet 5 inches. 

Weight is 197 pounds.

HEENT:  Head:  Normocephalic.   Eyes:  Anicteric with pink conjunctivae. 

Funduscopy not possible at this time.  Ears, nose and throat otherwise

normal.

NECK:  Supple.  Thyroid gland shows nodular thyromegaly, which is firm and

nontender with no overt palpable thyroid nodules as noted.  No carotid

bruits or cervical adenopathy.

CARDIOPULMONARY:  Some adynamic precordium.  S1, S2.  Rapid and regular.

LUNGS:  Clear to auscultation.

ABDOMEN:  Flat, soft with positive bowel sounds.

EXTREMITIES:  No peripheral edema.  Pulses are +2 bilaterally.



LABORATORY DATA:  Thyroid studies showed a T4 of 7.59 with a free T4 of

1.06 and T3 of 0.788.  There was no TSH done as noted.  Chemistry showed a

BUN of 11, sodium 139, potassium 3.6, chloride 97, CO2 of 35, glucose 103

and creatinine 0.5.



ASSESSMENT:  This is an 87-year-old female with known history of

multinodular goiter with no overt obstructive or compressive

manifestations, presenting here with acute exacerbation of chronic

obstructive pulmonary disease and concomitant acute pneumonitis and is now

undergoing physical and occupational therapy for recent debilitation and is

now being referred for endocrine evaluation and management.  She also

remains clinically euthyroid at this time, and biochemically we awaiting

the results of the thyroid studies as ordered for tomorrow.  She has known

history of multinodular goiter with no overt compressive or obstructive

manifestations at this time.  She has significant cardiac vasculopathy with

coronary artery disease and previous coronary stent placements with known

history of hypertension and dyslipidemia as noted.



PLAN OF MANAGEMENT:  We will hold off any kind of levothyroxine suppressive

therapy pending the completion of the thyroid studies as ordered for

tomorrow morning.  We will obtain a thyroxine and a thyroid stimulating

hormone level as ordered.  We will also obtain serial chemistries and

supplement accordingly as needed.  We will follow up.







__________________________________________

Beth Lanier MD





DD:  02/13/2019 13:48:22

DT:  02/13/2019 13:52:14

Job # [id number]

## 2019-02-13 NOTE — CP.PCM.PN
Subjective





- Date & Time of Evaluation


Date of Evaluation: 02/13/19


Time of Evaluation: 10:25





- Subjective


Subjective: 





Seen on rounds in TCU.


Lying in bed comfortably, recently returned from shower.


Did have some IQBAL during showering with SpO2 decrease to 90%.


Continues to have hypertensive BP readings. Zestril increased to 20MG today.


Breathing more comfortably at present. Still has occasional cough.


Doing well with physical therapy.





Mouth, tongus and lips improved after one dose of hydrocortisone.


No respiratory distress, no muscle recruitment, no abdominal paradox.


Trace dependant edema of LEs w/o cyanosis or calf tenderness.


Neck is supple and trachea midline.


Breath sounds are quite diminished bilaterally w/o audible wheezes.


Dry to medium rales are present in the lower lobes posteriorly w/o bronchial 

breathing or egophony.





Continue present medical and aerosol regimen.


Will request Endocrine consult for multinodular thyroid with mass in left upper 

pole.





Objective





- Vital Signs/Intake and Output


Vital Signs (last 24 hours): 


                                        











Temp Pulse Resp BP Pulse Ox


 


 98.6 F   85   18   150/82   90 L


 


 02/13/19 08:19  02/13/19 09:46  02/13/19 08:19  02/13/19 09:46  02/13/19 09:46











- Medications


Medications: 


                               Current Medications





Apixaban (Eliquis)  [facility] mg PO BID [facility]


   Last Admin: 02/13/19 08:40 Dose:  5 mg


Bisoprolol Fumarate (Zebeta)  5 mg PO DAILY [facility]


   Last Admin: 02/13/19 08:41 Dose:  5 mg


Diltiazem HCl (Cardizem)  30 mg PO TID [facility]


   Last Admin: 02/13/19 08:40 Dose:  30 mg


Guaifenesin (Robitussin)  200 mg PO Q6 PRN


   PRN Reason: Cough


   Last Admin: 02/13/19 05:47 Dose:  200 mg


Ipratropium Bromide (Atrovent)  0.5 mg IH RQID [facility]


   Last Admin: 02/13/19 07:40 Dose:  0.5 mg


Isosorbide Mononitrate (Imdur Er)  30 mg PO DAILY [facility]


   Last Admin: 02/13/19 08:40 Dose:  30 mg


Levalbuterol HCl (Xopenex)  0.63 mg INH RQID [facility]


   Last Admin: 02/13/19 07:40 Dose:  0.63 mg


Levalbuterol HCl (Xopenex)  1.25 mg INH RQ4 PRN


   PRN Reason: Shortness of Breath


   Last Admin: 02/09/19 04:20 Dose:  1.25 mg


Lisinopril (Zestril)  20 mg PO DAILY [facility]


   Last Admin: 02/13/19 08:40 Dose:  20 mg


Nystatin (Nystop Topical Powder)  1 applic TOP TID [facility]


   Last Admin: 02/13/19 08:40 Dose:  1 applic


Pantoprazole Sodium (Protonix Ec Tab)  40 mg PO DAILY [facility]


   Last Admin: 02/13/19 08:40 Dose:  40 mg











- Labs


Labs: 


                                        





                                 02/08/19 05:30 





                                 02/08/19 05:30 











Assessment and Plan


(1) Afib


Status: Resolved   





(2) Multinodular thyroid


Status: Chronic   





(3) Pneumonia


Status: Acute   





(4) COPD exacerbation


Status: Chronic   





(5) Hypoxemia requiring supplemental oxygen


Status: Chronic

## 2019-02-14 LAB
ALBUMIN SERPL-MCNC: 3.4 G/DL (ref 3.5–5)
ALBUMIN/GLOB SERPL: 1.1 {RATIO} (ref 1–2.1)
ALT SERPL-CCNC: 26 U/L (ref 9–52)
AST SERPL-CCNC: 18 U/L (ref 14–36)
BUN SERPL-MCNC: 13 MG/DL (ref 7–17)
CALCIUM SERPL-MCNC: 9 MG/DL (ref 8.4–10.2)
GFR NON-AFRICAN AMERICAN: > 60
T4 SERPL-MCNC: 8.79 UG/DL (ref 5.5–11)

## 2019-02-14 RX ADMIN — GUAIFENESIN AND DEXTROMETHORPHAN HYDROBROMIDE SCH TAB: 600; 30 TABLET, EXTENDED RELEASE ORAL at 21:13

## 2019-02-14 RX ADMIN — IPRATROPIUM BROMIDE SCH MG: 0.5 SOLUTION RESPIRATORY (INHALATION) at 07:32

## 2019-02-14 RX ADMIN — LEVALBUTEROL SCH MG: 0.63 SOLUTION RESPIRATORY (INHALATION) at 15:19

## 2019-02-14 RX ADMIN — LEVALBUTEROL SCH MG: 0.63 SOLUTION RESPIRATORY (INHALATION) at 07:32

## 2019-02-14 RX ADMIN — GUAIFENESIN PRN MG: 100 SOLUTION ORAL at 08:23

## 2019-02-14 RX ADMIN — PANTOPRAZOLE SODIUM SCH MG: 40 TABLET, DELAYED RELEASE ORAL at 08:24

## 2019-02-14 RX ADMIN — LEVALBUTEROL SCH MG: 0.63 SOLUTION RESPIRATORY (INHALATION) at 11:08

## 2019-02-14 RX ADMIN — IPRATROPIUM BROMIDE SCH MG: 0.5 SOLUTION RESPIRATORY (INHALATION) at 15:19

## 2019-02-14 RX ADMIN — IPRATROPIUM BROMIDE SCH MG: 0.5 SOLUTION RESPIRATORY (INHALATION) at 11:08

## 2019-02-14 RX ADMIN — IPRATROPIUM BROMIDE SCH MG: 0.5 SOLUTION RESPIRATORY (INHALATION) at 19:09

## 2019-02-14 RX ADMIN — LEVALBUTEROL SCH MG: 0.63 SOLUTION RESPIRATORY (INHALATION) at 19:09

## 2019-02-14 NOTE — CP.PCM.PN
Subjective





- Date & Time of Evaluation


Date of Evaluation: 02/14/19


Time of Evaluation: 10:50





- Subjective


Subjective: 





Looks better this morning.


Slept well last night, awakened refreshed.


BP better controlled on current regimen.


Oxygenation better, 94% seated in the gym on [place].


Breath sounds are diminished bilaterally with dry basal rales.


No audible wheezing or bronchial breathing.


AM chemistries noted.


Endocrine eval appreciated.





Objective





- Vital Signs/Intake and Output


Vital Signs (last 24 hours): 


                                        











Temp Pulse Resp BP Pulse Ox


 


 97.6 F   64   18   161/74 H  96 


 


 02/14/19 08:07  02/14/19 08:23  02/14/19 08:07  02/14/19 08:07  02/14/19 08:07











- Medications


Medications: 


                               Current Medications





Apixaban (Eliquis)  5 mg PO BID [facility]; Protocol


Bisoprolol Fumarate (Zebeta)  5 mg PO DAILY [facility]


   Last Admin: 02/14/19 08:24 Dose:  5 mg


Diltiazem HCl (Cardizem)  30 mg PO TID [facility]


   Last Admin: 02/14/19 08:23 Dose:  30 mg


Guaifenesin/Dextromethorphan (Mucinex-Dm 600-30 Mg)  1 tab PO Q12H [facility]


Ipratropium Bromide (Atrovent)  0.5 mg IH RQID [facility]


   Last Admin: 02/14/19 07:32 Dose:  0.5 mg


Isosorbide Mononitrate (Imdur Er)  30 mg PO DAILY [facility]


   Last Admin: 02/14/19 08:24 Dose:  30 mg


Levalbuterol HCl (Xopenex)  0.63 mg INH RQID [facility]


   Last Admin: 02/14/19 07:32 Dose:  0.63 mg


Levalbuterol HCl (Xopenex)  1.25 mg INH RQ4 PRN


   PRN Reason: Shortness of Breath


   Last Admin: 02/09/19 04:20 Dose:  1.25 mg


Lisinopril (Zestril)  20 mg PO DAILY [facility]


   Last Admin: 02/14/19 08:24 Dose:  20 mg


Nystatin (Nystop Topical Powder)  1 applic TOP TID [facility]


   Last Admin: 02/14/19 08:23 Dose:  1 applic


Pantoprazole Sodium (Protonix Ec Tab)  40 mg PO DAILY [facility]


   Last Admin: 02/14/19 08:24 Dose:  40 mg











- Labs


Labs: 


                                        





                                 02/08/19 05:30 





                                 02/14/19 05:45 











Assessment and Plan


(1) Afib


Status: Resolved   





(2) Multinodular thyroid


Status: Chronic   





(3) Pneumonia


Status: Acute   





(4) COPD exacerbation


Status: Chronic   





(5) Hypoxemia requiring supplemental oxygen


Status: Chronic

## 2019-02-14 NOTE — CP.PCM.PN
Subjective





- Date & Time of Evaluation


Date of Evaluation: 02/14/19


Time of Evaluation: 13:30





- Subjective


Subjective: 





Patient was seen and examined sitting upright in chair breathing comfortably on 

2 L of oxygen via NC saturating 94-95%. She denies any fever, chills, chest pain

or shortness of breath.  





Objective





- Vital Signs/Intake and Output


Vital Signs (last 24 hours): 


                                        











Temp Pulse Resp BP Pulse Ox


 


 97.6 F   58 L  18   120/55 L  96 


 


 02/14/19 08:07  02/14/19 12:01  02/14/19 08:07  02/14/19 12:01  02/14/19 08:07











- Medications


Medications: 


                               Current Medications





Apixaban (Eliquis)  5 mg PO BID [facility]; Protocol


   Last Admin: 02/14/19 12:01 Dose:  5 mg


Bisoprolol Fumarate (Zebeta)  5 mg PO DAILY [facility]


   Last Admin: 02/14/19 08:24 Dose:  5 mg


Diltiazem HCl (Cardizem)  30 mg PO TID [facility]


   Last Admin: 02/14/19 12:01 Dose:  30 mg


Guaifenesin/Dextromethorphan (Mucinex-Dm 600-30 Mg)  1 tab PO Q12H [facility]


   Last Admin: 02/14/19 12:01 Dose:  1 tab


Ipratropium Bromide (Atrovent)  0.5 mg IH RQID [facility]


   Last Admin: 02/14/19 11:08 Dose:  0.5 mg


Isosorbide Mononitrate (Imdur Er)  30 mg PO DAILY [facility]


   Last Admin: 02/14/19 08:24 Dose:  30 mg


Levalbuterol HCl (Xopenex)  0.63 mg INH RQID [facility]


   Last Admin: 02/14/19 11:08 Dose:  0.63 mg


Levalbuterol HCl (Xopenex)  1.25 mg INH RQ4 PRN


   PRN Reason: Shortness of Breath


   Last Admin: 02/09/19 04:20 Dose:  1.25 mg


Lisinopril (Zestril)  20 mg PO DAILY [facility]


   Last Admin: 02/14/19 08:24 Dose:  20 mg


Nystatin (Nystop Topical Powder)  1 applic TOP TID [facility]


   Last Admin: 02/14/19 12:01 Dose:  1 applic


Pantoprazole Sodium (Protonix Ec Tab)  40 mg PO DAILY [facility]


   Last Admin: 02/14/19 08:24 Dose:  40 mg











- Labs


Labs: 


                                        





                                 02/08/19 05:30 





                                 02/14/19 05:45 











- Constitutional


Appears: Well, Non-toxic





- Head Exam


Head Exam: ATRAUMATIC





- Eye Exam


Eye Exam: EOMI





- ENT Exam


ENT Exam: Mucous Membranes Moist





- Respiratory Exam


Respiratory Exam: absent: Wheezes, Respiratory Distress, Stridor





- Cardiovascular Exam


Cardiovascular Exam: REGULAR RHYTHM, +S1, +S2





- GI/Abdominal Exam


GI & Abdominal Exam: Soft, Normal Bowel Sounds.  absent: Guarding, Rigid, 

Tenderness





- Extremities Exam


Extremities Exam: absent: Calf Tenderness





- Neurological Exam


Neurological Exam: Alert, Awake, Oriented x3





- Psychiatric Exam


Psychiatric exam: Normal Affect, Normal Mood





- Skin


Skin Exam: Dry, Intact





Assessment and Plan





- Assessment and Plan (Free Text)


Assessment: 


88 y/o F, currently stable, with PMH  of COPD, CAD, CHF, CAD  & paroxysmal atr

ial fibrillation was admitted for TCU for continuation of IV antibiotics and 

physical therapy. She was admitted recently in telemetry with syncopy, Afib with

RVR  COPD exacerbation with CAP and hypoxemia. Once stabilized she was 

transferred to TCU.





1. Generalized weakness 


-C/w PT 


2. Angioedema (resolved)


-suspected  medication side effect so Amiodarone  was discontinued 


3.Paroxysmal Afib 


-back in SR 


-cardiology was consulted 


-Discontinued Amiodarone and restarted cardizem PO 


-Continue eliquis 5 mg po bid 





4.CAP 


-treated with IV antibiotics


-Pulmonary on consulted discontinued Clindamycin and Zithromycin 





5. COPD with hypoxemia


-Pulmonary recommendations appreciated


-Continue O2 via NC. Patient uses home O2 12 hours / day


-Continue Solucortef & Xopenex 





6. CHF , compensated ,EF 55 %


-Continue with zebeta  & zestril





7.CAD (hx of stent) - stable


-continue eliquis ( discontinued ASA and Plavix after discussion with cardiology

)





8. Thyroid nodules - outpatient work up 


incidental thyroid mass 2.3 cm noted on CT neck 


TSH 4


outpatient work up recommended 





9. Hypertension (labile)


-C/w zebeta & zestril





10.DVt prophylaxis


on eliquis

## 2019-02-14 NOTE — PN
DATE:  02/14/2019



ENDOCRINOLOGY FOLLOWUP NOTE



LOCATION:  Room 707 U.



SUBJECTIVE:  This is an 87-year-old female with recent acute exacerbation

of COPD and supervening acute pneumonitis, currently receiving IV

antibiotic management and is also being followed closely for metabolic

management.  She has known history of an underlying multinodular goiter

with no overt compressive manifestations at this time.  She remains

clinically euthyroid and biochemically her thyroid studies showed a T4 or

thyroxine of 8.79 with a TSH of 3.26 and she clearly is also biochemically

euthyroid at this time.



Her chemistry showed a BUN of 13, sodium 138, potassium 4.2, chloride 98,

CO2 of 31, glucose 111 and creatinine 0.6.



ASSESSMENT:  This is an 87-year-old female with acute exacerbation of

chronic obstructive pulmonary disease and supervening pneumonitis,

currently receiving IV antibiotic management and is being followed closely

also for metabolic management.  She also remains clinically and

biochemically euthyroid at this time with an underlying multinodular goiter

with no overt compressive or obstructive manifestations thereof.



PLAN OF MANAGEMENT:  We will hold off any kind of levothyroxine replacement

or suppressive therapy for now and obtain serial thyroid studies

accordingly.  We will obtain serial chemistries and supplement accordingly

as needed.  We are awaiting the thyroid antibodies which will confirm

and/or indicate the presence of underlying thyroid autoimmunity.  We will

follow and advise accordingly.







__________________________________________

Beth Lanier MD





DD:  02/14/2019 14:22:16

DT:  02/14/2019 14:23:29

Job # [id number]

## 2019-02-15 LAB — THYROGLOB SERPL-MCNC: 27.8 NG/ML (ref 2.8–40.9)

## 2019-02-15 RX ADMIN — IPRATROPIUM BROMIDE SCH MG: 0.5 SOLUTION RESPIRATORY (INHALATION) at 19:13

## 2019-02-15 RX ADMIN — LEVALBUTEROL SCH MG: 0.63 SOLUTION RESPIRATORY (INHALATION) at 08:23

## 2019-02-15 RX ADMIN — LEVALBUTEROL SCH MG: 0.63 SOLUTION RESPIRATORY (INHALATION) at 15:06

## 2019-02-15 RX ADMIN — LEVALBUTEROL SCH MG: 0.63 SOLUTION RESPIRATORY (INHALATION) at 19:13

## 2019-02-15 RX ADMIN — PANTOPRAZOLE SODIUM SCH MG: 40 TABLET, DELAYED RELEASE ORAL at 08:17

## 2019-02-15 RX ADMIN — IPRATROPIUM BROMIDE SCH: 0.5 SOLUTION RESPIRATORY (INHALATION) at 11:57

## 2019-02-15 RX ADMIN — LEVALBUTEROL SCH: 0.63 SOLUTION RESPIRATORY (INHALATION) at 11:57

## 2019-02-15 RX ADMIN — GUAIFENESIN AND DEXTROMETHORPHAN HYDROBROMIDE SCH TAB: 600; 30 TABLET, EXTENDED RELEASE ORAL at 08:15

## 2019-02-15 RX ADMIN — GUAIFENESIN AND DEXTROMETHORPHAN HYDROBROMIDE SCH TAB: 600; 30 TABLET, EXTENDED RELEASE ORAL at 21:03

## 2019-02-15 RX ADMIN — IPRATROPIUM BROMIDE SCH MG: 0.5 SOLUTION RESPIRATORY (INHALATION) at 15:07

## 2019-02-15 RX ADMIN — IPRATROPIUM BROMIDE SCH MG: 0.5 SOLUTION RESPIRATORY (INHALATION) at 08:23

## 2019-02-15 NOTE — PN
DATE:  02/15/2019



ENDOCRINOLOGY FOLLOWUP NOTE



LOCATION:  Room 707.



SUBJECTIVE:  This is a 87-year-old female with recent acute exacerbation of

COPD and supervening acute pneumonitis and is now being followed closely

for metabolic management.  She also has an underlying multinodular goiter

with no overt compression or obstructive symptoms in the neck area as

noted.



LABORATORY DATA:  Her chemistries showed a BUN of 13, sodium 138, potassium

4.2, chloride 98, CO2 of 31, glucose 111 and creatinine 0.6.  Her glucose

levels have been optimal and have ranged from 104-111 mg/dL.  Her thyroid

studies showed a total T4 or thyroxine of 8.79 with a TSH of 3.26 as noted.



ASSESSMENT AND PLAN:  She remains clinically and biochemically euthyroid at

this time.  No indication for any kind of levothyroxine suppressive or

replacement therapy as noted.  We will obtain serial chemistries and

supplement accordingly as needed.  We will follow.







__________________________________________

Beth Lanier MD





DD:  02/15/2019 14:19:35

DT:  02/15/2019 14:21:23

Job # [id number]

## 2019-02-15 NOTE — CP.PCM.PN
Subjective





- Date & Time of Evaluation


Date of Evaluation: 02/15/19


Time of Evaluation: 09:52





- Subjective


Subjective: 





Seated in the bedside chair, comfortable.


Feels quite improved.


Doing well with physical therapy.


Vital signs have remained stable.


Followed by endocrinology.


Anxious for discharge to home.





Trace dependant edema, no cyanosis, warm extremities.


Breath sounds are diminished bilaterally with dry basal rales.





Doing well on present regimen.


Continue same.





Objective





- Vital Signs/Intake and Output


Vital Signs (last 24 hours): 


                                        











Temp Pulse Resp BP Pulse Ox


 


 98.5 F   73   18   157/72 H  95 


 


 02/15/19 08:05  02/15/19 08:16  02/15/19 08:05  02/15/19 08:16  02/15/19 08:05











- Medications


Medications: 


                               Current Medications





Apixaban (Eliquis)  5 mg PO BID [facility]; Protocol


   Last Admin: 02/15/19 08:18 Dose:  5 mg


Bisoprolol Fumarate (Zebeta)  5 mg PO DAILY [facility]


   Last Admin: 02/15/19 08:18 Dose:  5 mg


Diltiazem HCl (Cardizem)  30 mg PO TID [facility]


   Last Admin: 02/15/19 08:15 Dose:  30 mg


Guaifenesin/Dextromethorphan (Mucinex-Dm 600-30 Mg)  1 tab PO Q12H [facility]


   Last Admin: 02/15/19 08:15 Dose:  1 tab


Ipratropium Bromide (Atrovent)  0.5 mg IH RQID [facility]


   Last Admin: 02/15/19 08:23 Dose:  0.5 mg


Isosorbide Mononitrate (Imdur Er)  30 mg PO DAILY [facility]


   Last Admin: 02/15/19 08:17 Dose:  30 mg


Levalbuterol HCl (Xopenex)  0.63 mg INH RQID [facility]


   Last Admin: 02/15/19 08:23 Dose:  0.63 mg


Levalbuterol HCl (Xopenex)  1.25 mg INH RQ4 PRN


   PRN Reason: Shortness of Breath


   Last Admin: 02/09/19 04:20 Dose:  1.25 mg


Lisinopril (Zestril)  20 mg PO DAILY [facility]


   Last Admin: 02/15/19 08:16 Dose:  20 mg


Nystatin (Nystop Topical Powder)  1 applic TOP TID [facility]


   Last Admin: 02/15/19 08:18 Dose:  1 applic


Pantoprazole Sodium (Protonix Ec Tab)  40 mg PO DAILY [facility]


   Last Admin: 02/15/19 08:17 Dose:  40 mg











- Labs


Labs: 


                                        





                                 02/08/19 05:30 





                                 02/14/19 05:45 











Assessment and Plan


(1) Multinodular thyroid


Status: Chronic   





(2) Pneumonia


Status: Acute   





(3) COPD exacerbation


Status: Chronic   





(4) Hypoxemia requiring supplemental oxygen


Status: Chronic

## 2019-02-16 RX ADMIN — IPRATROPIUM BROMIDE SCH MG: 0.5 SOLUTION RESPIRATORY (INHALATION) at 19:49

## 2019-02-16 RX ADMIN — GUAIFENESIN AND DEXTROMETHORPHAN HYDROBROMIDE SCH TAB: 600; 30 TABLET, EXTENDED RELEASE ORAL at 08:24

## 2019-02-16 RX ADMIN — GUAIFENESIN AND DEXTROMETHORPHAN HYDROBROMIDE SCH TAB: 600; 30 TABLET, EXTENDED RELEASE ORAL at 22:13

## 2019-02-16 RX ADMIN — LEVALBUTEROL SCH MG: 0.63 SOLUTION RESPIRATORY (INHALATION) at 15:37

## 2019-02-16 RX ADMIN — IPRATROPIUM BROMIDE SCH MG: 0.5 SOLUTION RESPIRATORY (INHALATION) at 11:37

## 2019-02-16 RX ADMIN — PANTOPRAZOLE SODIUM SCH MG: 40 TABLET, DELAYED RELEASE ORAL at 08:24

## 2019-02-16 RX ADMIN — LEVALBUTEROL SCH MG: 0.63 SOLUTION RESPIRATORY (INHALATION) at 19:49

## 2019-02-16 RX ADMIN — LEVALBUTEROL SCH MG: 0.63 SOLUTION RESPIRATORY (INHALATION) at 08:22

## 2019-02-16 RX ADMIN — IPRATROPIUM BROMIDE SCH MG: 0.5 SOLUTION RESPIRATORY (INHALATION) at 15:38

## 2019-02-16 RX ADMIN — IPRATROPIUM BROMIDE SCH MG: 0.5 SOLUTION RESPIRATORY (INHALATION) at 08:22

## 2019-02-16 RX ADMIN — LEVALBUTEROL SCH MG: 0.63 SOLUTION RESPIRATORY (INHALATION) at 11:37

## 2019-02-16 NOTE — PN
DATE:  02/16/2019



ENDOCRINOLOGY FOLLOWUP NOTE



LOCATION:  Room 707.



SUBJECTIVE:  This is an 87-year-old female with recent acute exacerbation

of COPD and supervening acute pneumonitis, now being followed closely for

metabolic management.  She also is undergoing physical and occupational

therapy for underlying debility as noted.  She has also a concomitant

multinodular goiter with no overt compressive or obstructive neck

manifestations.  She remains clinically and biochemically euthyroid at this

time.  Her latest thyroxine level is 8.79 with a TSH of 3.26.  Her

thyroglobulin level is 27.8 as noted.  So at this time, we will hold off

any kind of levothyroxine replacement therapy as noted.  We will obtain

serial chemistry and supplement accordingly as needed.  We will follow.





__________________________________________

Beth Lanier MD





DD:  02/16/2019 8:04:40

DT:  02/16/2019 8:05:41

Job # [id number]

## 2019-02-17 RX ADMIN — IPRATROPIUM BROMIDE SCH MG: 0.5 SOLUTION RESPIRATORY (INHALATION) at 07:40

## 2019-02-17 RX ADMIN — IPRATROPIUM BROMIDE SCH MG: 0.5 SOLUTION RESPIRATORY (INHALATION) at 15:20

## 2019-02-17 RX ADMIN — LEVALBUTEROL SCH MG: 0.63 SOLUTION RESPIRATORY (INHALATION) at 15:20

## 2019-02-17 RX ADMIN — LEVALBUTEROL SCH MG: 0.63 SOLUTION RESPIRATORY (INHALATION) at 07:40

## 2019-02-17 RX ADMIN — GUAIFENESIN AND DEXTROMETHORPHAN HYDROBROMIDE SCH TAB: 600; 30 TABLET, EXTENDED RELEASE ORAL at 08:29

## 2019-02-17 RX ADMIN — IPRATROPIUM BROMIDE SCH MG: 0.5 SOLUTION RESPIRATORY (INHALATION) at 19:20

## 2019-02-17 RX ADMIN — GUAIFENESIN AND DEXTROMETHORPHAN HYDROBROMIDE SCH TAB: 600; 30 TABLET, EXTENDED RELEASE ORAL at 21:30

## 2019-02-17 RX ADMIN — PANTOPRAZOLE SODIUM SCH MG: 40 TABLET, DELAYED RELEASE ORAL at 08:29

## 2019-02-17 RX ADMIN — IPRATROPIUM BROMIDE SCH MG: 0.5 SOLUTION RESPIRATORY (INHALATION) at 11:08

## 2019-02-17 RX ADMIN — LEVALBUTEROL SCH MG: 0.63 SOLUTION RESPIRATORY (INHALATION) at 19:20

## 2019-02-17 RX ADMIN — LEVALBUTEROL SCH MG: 0.63 SOLUTION RESPIRATORY (INHALATION) at 11:08

## 2019-02-17 NOTE — PN
DATE:  02/17/2019



ENDO FOLLOWUP NOTE



LOCATION:  Room 707.



SUBJECTIVE:  This is a 87-year-old female with recent acute exacerbation of

COPD, currently receiving physical and occupational therapy for recent

debility and is now being followed closely also for metabolic management. 

She remains clinically and biochemically euthyroid at this time with the

latest T4 of 8.79 and a TSH of 3.26 and a thyroglobulin level of 27.8.  Her

glucose values are near optimal ranging from 94 to 111 mg/dL.  Her

chemistry showed a BUN of 13, sodium 138, potassium 4.2, chloride 98, CO2

of 31, glucose 111 and creatinine 0.6.  She has an underlying multinodular

goiter with no overt compressive or obstructive neck manifestations as

noted thereof.  So there is no indication for now for any kind of thyroid

pharmacotherapy, and we will obtain serial thyroid studies accordingly.  We

will follow.







__________________________________________

Beth Lanier MD





DD:  02/17/2019 13:05:15

DT:  02/17/2019 13:06:43

Job # [id number]

## 2019-02-18 LAB
ALBUMIN SERPL-MCNC: 3.4 G/DL (ref 3.5–5)
ALBUMIN/GLOB SERPL: 1.1 {RATIO} (ref 1–2.1)
ALT SERPL-CCNC: 27 U/L (ref 9–52)
ANISOCYTOSIS BLD QL SMEAR: SLIGHT
AST SERPL-CCNC: 18 U/L (ref 14–36)
BASOPHILS # BLD AUTO: 0 K/UL (ref 0–0.2)
BASOPHILS NFR BLD: 0.4 % (ref 0–2)
BUN SERPL-MCNC: 13 MG/DL (ref 7–17)
CALCIUM SERPL-MCNC: 8.8 MG/DL (ref 8.4–10.2)
EOSINOPHIL # BLD AUTO: 0.2 K/UL (ref 0–0.7)
EOSINOPHIL NFR BLD: 1 % (ref 0–7)
EOSINOPHIL NFR BLD: 2.1 % (ref 0–4)
ERYTHROCYTE [DISTWIDTH] IN BLOOD BY AUTOMATED COUNT: 15.9 % (ref 11.5–14.5)
GFR NON-AFRICAN AMERICAN: > 60
HGB BLD-MCNC: 10.6 G/DL (ref 12–16)
HYPERSEGMENTATION: PRESENT
HYPOCHROMIC: SLIGHT
LYMPHOCYTE: 8 % (ref 20–50)
LYMPHOCYTES # BLD AUTO: 0.7 K/UL (ref 1–4.3)
LYMPHOCYTES NFR BLD AUTO: 9.1 % (ref 20–40)
MCH RBC QN AUTO: 27.7 PG (ref 27–31)
MCHC RBC AUTO-ENTMCNC: 32.3 G/DL (ref 33–37)
MCV RBC AUTO: 85.8 FL (ref 81–99)
MONOCYTE: 7 % (ref 0–10)
MONOCYTES # BLD: 0.6 K/UL (ref 0–0.8)
MONOCYTES NFR BLD: 7.2 % (ref 0–10)
NEUTROPHILS # BLD: 6.5 K/UL (ref 1.8–7)
NEUTROPHILS NFR BLD AUTO: 81.2 % (ref 50–75)
NEUTROPHILS NFR BLD AUTO: 82 % (ref 42–75)
NEUTS BAND NFR BLD: 2 % (ref 0–2)
NRBC BLD AUTO-RTO: 0.1 % (ref 0–0)
OVALOCYTES BLD QL SMEAR: SLIGHT
PLATELET # BLD EST: NORMAL 10*3/UL
PLATELET # BLD: 273 K/UL (ref 130–400)
PMV BLD AUTO: 7.8 FL (ref 7.2–11.7)
RBC # BLD AUTO: 3.81 MIL/UL (ref 3.8–5.2)
T4 SERPL-MCNC: 7.06 UG/DL (ref 5.5–11)
TOTAL CELLS COUNTED BLD: 100
TOXIC GRANULES BLD QL SMEAR: PRESENT
WBC # BLD AUTO: 8 K/UL (ref 4.8–10.8)

## 2019-02-18 RX ADMIN — LEVALBUTEROL SCH MG: 0.63 SOLUTION RESPIRATORY (INHALATION) at 07:08

## 2019-02-18 RX ADMIN — IPRATROPIUM BROMIDE SCH MG: 0.5 SOLUTION RESPIRATORY (INHALATION) at 20:00

## 2019-02-18 RX ADMIN — IPRATROPIUM BROMIDE SCH MG: 0.5 SOLUTION RESPIRATORY (INHALATION) at 07:08

## 2019-02-18 RX ADMIN — GUAIFENESIN AND DEXTROMETHORPHAN HYDROBROMIDE SCH TAB: 600; 30 TABLET, EXTENDED RELEASE ORAL at 08:57

## 2019-02-18 RX ADMIN — IPRATROPIUM BROMIDE SCH MG: 0.5 SOLUTION RESPIRATORY (INHALATION) at 15:57

## 2019-02-18 RX ADMIN — LEVALBUTEROL SCH MG: 0.63 SOLUTION RESPIRATORY (INHALATION) at 15:58

## 2019-02-18 RX ADMIN — GUAIFENESIN AND DEXTROMETHORPHAN HYDROBROMIDE SCH TAB: 600; 30 TABLET, EXTENDED RELEASE ORAL at 22:07

## 2019-02-18 RX ADMIN — Medication PRN MG: at 01:13

## 2019-02-18 RX ADMIN — IPRATROPIUM BROMIDE SCH MG: 0.5 SOLUTION RESPIRATORY (INHALATION) at 12:52

## 2019-02-18 RX ADMIN — LEVALBUTEROL SCH MG: 0.63 SOLUTION RESPIRATORY (INHALATION) at 12:52

## 2019-02-18 RX ADMIN — LEVALBUTEROL SCH MG: 0.63 SOLUTION RESPIRATORY (INHALATION) at 20:09

## 2019-02-18 RX ADMIN — PANTOPRAZOLE SODIUM SCH MG: 40 TABLET, DELAYED RELEASE ORAL at 09:04

## 2019-02-18 RX ADMIN — Medication PRN MG: at 23:20

## 2019-02-18 NOTE — PN
DATE:  02/18/2019





LOCATION:  Room 707.



SUBJECTIVE:  This is an 87-year-old female with recent admission for acute

exacerbation of COPD and is now being followed closely for metabolic

management.  She also has an underlying multinodular goiter with no overt

compressive or obstructive manifestations in the neck area as noted.  She

remains clinically and biochemically euthyroid at this time and the latest

thyroid study showed a T4 of 7.06 with a TSH of 2.83.  Her chemistry showed

a BUN of 13, sodium 137, potassium 4.2, chloride 98, CO2 of 30, glucose 132

and creatinine 0.7.



PLAN:  So, at this time, we will continue the present medical management

with no need for any kind of levothyroxine suppressive therapy as noted. 

We will obtain serial chemistries and supplement accordingly as needed.  We

will follow.







__________________________________________

Beth Lanier MD





DD:  02/18/2019 13:00:50

DT:  02/18/2019 13:03:56

Job # [id number]

## 2019-02-18 NOTE — CP.PCM.PN
Subjective





- Date & Time of Evaluation


Date of Evaluation: 02/18/19


Time of Evaluation: 09:15





- Subjective


Subjective: 





Seen on rounds in TCU.


Vital signs remain stable.


Has done well with physical therapy.


Sleeping well.


Using all medications as prescribed and remains stable.





Breath sounds are diminished bilaterally. 


Dry rales in lower lobes of both lungs.


No audible wheezing or bronchial breathing.


Heart sounds are distant, rhythm regular.


Trace dependant edema bilaterally, no cyanosis.





Appears ready for discharge to home.


Will resume Anoro Ellipta, one inhalation daily.


All other medications as presently given:


Zestril 20MG OD,


Diltiazem 30MG TID.


Eliquis 5MG BID,


Imdur 30MG OD,


Zebeta 5MG OD,


Protonix 40MG OD,


Albuterol neb soln PRN.





Objective





- Vital Signs/Intake and Output


Vital Signs (last 24 hours): 


                                        











Temp Pulse Resp BP Pulse Ox


 


 98.5 F   85   17   117/69   96 


 


 02/17/19 19:58  02/18/19 08:58  02/18/19 08:57  02/18/19 08:58  02/18/19 08:57











- Medications


Medications: 


                               Current Medications





Apixaban (Eliquis)  5 mg PO BID [facility]; Protocol


   Last Admin: 02/17/19 16:31 Dose:  5 mg


Bisoprolol Fumarate (Zebeta)  5 mg PO DAILY [facility]


   Last Admin: 02/18/19 08:57 Dose:  5 mg


Diltiazem HCl (Cardizem)  30 mg PO TID [facility]


   Last Admin: 02/18/19 08:57 Dose:  30 mg


Guaifenesin/Dextromethorphan (Mucinex-Dm 600-30 Mg)  1 tab PO Q12H [facility]


   Last Admin: 02/18/19 08:57 Dose:  1 tab


Ipratropium Bromide (Atrovent)  0.5 mg IH RQID [facility]


   Last Admin: 02/18/19 07:08 Dose:  0.5 mg


Isosorbide Mononitrate (Imdur Er)  30 mg PO DAILY [facility]


   Last Admin: 02/18/19 08:58 Dose:  30 mg


Levalbuterol HCl (Xopenex)  0.63 mg INH RQID [facility]


   Last Admin: 02/18/19 07:08 Dose:  0.63 mg


Levalbuterol HCl (Xopenex)  1.25 mg INH RQ4 PRN


   PRN Reason: Shortness of Breath


   Last Admin: 02/18/19 01:13 Dose:  1.25 mg


Lisinopril (Zestril)  20 mg PO DAILY [facility]


   Last Admin: 02/18/19 08:58 Dose:  20 mg


Nystatin (Nystop Topical Powder)  1 applic TOP TID [facility]


   Last Admin: 02/18/19 08:57 Dose:  1 applic


Pantoprazole Sodium (Protonix Ec Tab)  40 mg PO DAILY [facility]


   Last Admin: 02/18/19 09:04 Dose:  40 mg











- Labs


Labs: 


                                        





                                 02/18/19 07:45 





                                 02/18/19 07:45 











Assessment and Plan


(1) Multinodular thyroid


Status: Chronic   





(2) Pneumonia


Status: Acute   





(3) COPD exacerbation


Status: Chronic   





(4) Hypoxemia requiring supplemental oxygen


Status: Chronic

## 2019-02-19 VITALS — TEMPERATURE: 98.8 F

## 2019-02-19 VITALS — OXYGEN SATURATION: 96 % | RESPIRATION RATE: 20 BRPM

## 2019-02-19 VITALS — DIASTOLIC BLOOD PRESSURE: 63 MMHG | HEART RATE: 78 BPM | SYSTOLIC BLOOD PRESSURE: 148 MMHG

## 2019-02-19 RX ADMIN — LEVALBUTEROL SCH MG: 0.63 SOLUTION RESPIRATORY (INHALATION) at 07:20

## 2019-02-19 RX ADMIN — GUAIFENESIN AND DEXTROMETHORPHAN HYDROBROMIDE SCH TAB: 600; 30 TABLET, EXTENDED RELEASE ORAL at 08:44

## 2019-02-19 RX ADMIN — IPRATROPIUM BROMIDE SCH MG: 0.5 SOLUTION RESPIRATORY (INHALATION) at 11:10

## 2019-02-19 RX ADMIN — LEVALBUTEROL SCH MG: 0.63 SOLUTION RESPIRATORY (INHALATION) at 11:10

## 2019-02-19 RX ADMIN — PANTOPRAZOLE SODIUM SCH MG: 40 TABLET, DELAYED RELEASE ORAL at 08:45

## 2019-02-19 RX ADMIN — IPRATROPIUM BROMIDE SCH MG: 0.5 SOLUTION RESPIRATORY (INHALATION) at 07:20

## 2019-02-19 NOTE — PN
DATE:  02/19/2019



ENDO FOLLOWUP NOTE



ROOM:  707



SUBJECTIVE:  This is a 87-year-old female with recent admission for syncope

and supervening acute exacerbation of COPD, now being followed closely for

metabolic management.  She also has an underlying multinodular goiter with

no overt obstructive or compressive symptoms in the neck area as noted. 

She remains clinically and biochemically euthyroid at this time.  Her

latest chemistry showed a BUN of 13, sodium 137, potassium 4.2, chloride

98, CO2 30, glucose 132, and creatinine 0.7.  Her thyroid studies showed a

T4 of 7.06 with a TSH of 2.83.  So at this time, we will hold off any

levothyroxine replacement therapy as noted.  We will obtain serial thyroid

studies and titrate accordingly as indicated.  We will follow up.







__________________________________________

Beth Lanier MD





DD:  02/19/2019 9:25:57

DT:  02/19/2019 9:26:46

Job # [id number]

## 2019-02-19 NOTE — CP.PCM.DIS
Provider





- Provider


Date of Admission: 


02/06/19 17:23





Attending physician: 


Luann Gonzalez DO





Primary care physician: 


DR. Pyle


Consults: 








02/07/19 07:20


Pulmonology Consult Routine 


   Comment: 


   Consulting Provider: Molina Pyle


   Consulting Physician: Molina Pyle


   Reason for Consult: COPD with wheezing





02/13/19 10:21


Endocrinology Consult Routine 


   Comment: 


   Consulting Provider: Beth Lanire


   Consulting Physician: Beth Lanier


   Reason for Consult: Multinodular thyroid with left upper pole mass.











Time Spent in preparation of Discharge (in minutes): 20





Hospital Course





- Lab Results


Lab Results: 


                             Most Recent Lab Values











WBC  8.0 K/uL (4.8-10.8)  D 02/18/19  07:45    


 


RBC  3.81 Mil/uL (3.80-5.20)   02/18/19  07:45    


 


Hgb  10.6 g/dL (12.0-16.0)  L  02/18/19  07:45    


 


Hct  32.7 % (34.0-47.0)  L  02/18/19  07:45    


 


MCV  85.8 fl (81.0-99.0)   02/18/19  07:45    


 


MCH  27.7 pg (27.0-31.0)   02/18/19  07:45    


 


MCHC  32.3 g/dL (33.0-37.0)  L  02/18/19  07:45    


 


RDW  15.9 % (11.5-14.5)  H  02/18/19  07:45    


 


Plt Count  273 K/uL (130-400)  D 02/18/19  07:45    


 


MPV  7.8 fl (7.2-11.7)   02/18/19  07:45    


 


Neut % (Auto)  81.2 % (50.0-75.0)  H  02/18/19  07:45    


 


Lymph % (Auto)  9.1 % (20.0-40.0)  L  02/18/19  07:45    


 


Mono % (Auto)  7.2 % (0.0-10.0)   02/18/19  07:45    


 


Eos % (Auto)  2.1 % (0.0-4.0)   02/18/19  07:45    


 


Baso % (Auto)  0.4 % (0.0-2.0)   02/18/19  07:45    


 


Neut # (Auto)  6.5 K/uL (1.8-7.0)   02/18/19  07:45    


 


Lymph # (Auto)  0.7 K/uL (1.0-4.3)  L  02/18/19  07:45    


 


Mono # (Auto)  0.6 K/uL (0.0-0.8)   02/18/19  07:45    


 


Eos # (Auto)  0.2 K/uL (0.0-0.7)   02/18/19  07:45    


 


Baso # (Auto)  0.0 K/uL (0.0-0.2)   02/18/19  07:45    


 


Neutrophils % (Manual)  82 % (42-75)  H  02/18/19  07:45    


 


Band Neutrophils %  2 % (0-2)   02/18/19  07:45    


 


Lymphocytes % (Manual)  8 % (20-50)  L  02/18/19  07:45    


 


Monocytes % (Manual)  7 % (0-10)   02/18/19  07:45    


 


Eosinophils % (Manual)  1 % (0-7)   02/18/19  07:45    


 


Hypersegmented Polys  Present   02/18/19  07:45    


 


Toxic Granulation  Present   02/18/19  07:45    


 


Platelet Estimate  Normal  (NORMAL)   02/18/19  07:45    


 


Hypochromasia (manual)  Slight   02/18/19  07:45    


 


Anisocytosis (manual)  Slight   02/18/19  07:45    


 


Ovalocytes  Slight   02/18/19  07:45    


 


Sodium  137 mmol/l (132-148)   02/18/19  07:45    


 


Potassium  4.2 MMOL/L (3.6-5.0)   02/18/19  07:45    


 


Chloride  98 mmol/L ()   02/18/19  07:45    


 


Carbon Dioxide  30 mmol/L (22-30)   02/18/19  07:45    


 


Anion Gap  13  (10-20)   02/18/19  07:45    


 


BUN  13 mg/dl (7-17)   02/18/19  07:45    


 


Creatinine  0.7 mg/dl (0.7-1.2)   02/18/19  07:45    


 


Est GFR ( Amer)  > 60   02/18/19  07:45    


 


Est GFR (Non-Af Amer)  > 60   02/18/19  07:45    


 


POC Glucose (mg/dL)  104 mg/dL ()   02/19/19  05:12    


 


Random Glucose  132 mg/dL ()  H  02/18/19  07:45    


 


Calcium  8.8 mg/dL (8.4-10.2)   02/18/19  07:45    


 


Magnesium  2.0 MG/DL (1.6-2.3)   02/18/19  07:45    


 


Total Bilirubin  0.4 mg/dl (0.2-1.3)   02/18/19  07:45    


 


Direct Bilirubin  0.0 mg/ml (0.0-0.4)   02/09/19  14:55    


 


AST  18 U/L (14-36)   02/18/19  07:45    


 


ALT  27 U/L (9-52)   02/18/19  07:45    


 


Alkaline Phosphatase  113 U/L ()   02/18/19  07:45    


 


Total Protein  6.5 G/DL (6.3-8.2)   02/18/19  07:45    


 


Albumin  3.4 g/dL (3.5-5.0)  L  02/18/19  07:45    


 


Globulin  3.1 gm/dL (2.2-3.9)   02/18/19  07:45    


 


Albumin/Globulin Ratio  1.1  (1.0-2.1)   02/18/19  07:45    


 


Free T4  1.06 ng/dL (0.78-2.19)   02/09/19  14:55    


 


Thyroxine (T4)  7.06 ug/dl (5.5-11.0)   02/18/19  07:45    


 


Free T3 pg/mL  2.41 pg/mL (2.77-5.27)  L  02/09/19  14:55    


 


Total T3  0.788 nmol/L (1.49-2.60)  L  02/09/19  14:55    


 


Thyroglobulin, Quant  27.8 ng/mL (2.8-40.9)   02/14/19  05:45    


 


TSH 3rd Generation  2.83 mIU/ML (0.46-4.68)   02/18/19  07:45    


 


Thyroperoxidase Ab  1 IU/mL (<9)   02/14/19  05:45    


 


Thyroglobulin Antibody  <1 IU/mL (< OR = 1)   02/14/19  05:45    














- Hospital Course


Hospital Course: 


86 y/o F, with PMH  of COPD, CAD, CHF, CAD  & paroxysmal atrial fibrillation was

admitted for TCU for continuation of IV antibiotics and physical therapy. She 

was admitted recently in telemetry with syncopy, Afib with RVR  COPD exa

cerbation with CAP and hypoxemia. Once stabilized she was transferred to TCU.


While in TCU  she participated with PT for generalized weakness and showed 

improvement.


Pulmonary was consulted during this stay for her COPD and helped with management




At present she is stable and will be discharged home


She is advised to follow up with her PMD


Prescriptions sent to her pharmacy 








1. Generalized weakness 


Participated with PT and improved 





2. Angioedema (resolved)


suspected  medication side effect so Amiodarone  was discontinued 





3.Paroxysmal Afib 


back in SR 


cardiology was consulted 


Discontinued Amiodarone and restarted cardizem PO 


Continue eliquis 5 mg po bid 





4.CAP 


treated with IV antibiotics


Pulmonary was  consulted 


received clindamycin   and Zithromycin 





5. COPD with hypoxemia


Pulmonary was consulted 


Continue O2 via NC





6. CHF , compensated ,EF 55 %


Continue with zebeta  & zestril





7.CAD (hx of stent) - stable


continue eliquis ( discontinued ASA and Plavix after discussion with cardiology 

)





8. Thyroid nodules - outpatient work up 


incidental thyroid mass 2.3 cm noted on CT neck 


TSH 4


outpatient work up recommended 





9. Hypertension (labile)


continue zebeta & zestril














Discharge Exam





- Head Exam


Head Exam: ATRAUMATIC, NORMOCEPHALIC


Additional comments: 





obese





- Eye Exam


Eye Exam: EOMI, Normal appearance, PERRL


Pupil Exam: NORMAL ACCOMODATION





- ENT Exam


ENT Exam: Mucous Membranes Moist, Normal Exam





- Neck Exam


Neck exam: Full Rom, Normal Inspection





- Respiratory Exam


Respiratory Exam: Clear to PA & Lateral, NORMAL BREATHING PATTERN.  absent: 

Rales, Rhonchi, Wheezes





- Cardiovascular Exam


Cardiovascular Exam: REGULAR RHYTHM, RRR, +S1, +S2.  absent: JVD





- GI/Abdominal Exam


GI & Abdominal Exam: Normal Bowel Sounds, Soft.  absent: Distended, Guarding, 

Rebound, Tenderness





- Rectal Exam


Rectal Exam: Deferred





- Extremities Exam


Extremities exam: normal capillary refill, normal inspection, pedal pulses 

present





- Back Exam


Back exam: NORMAL INSPECTION





- Neurological Exam


Neurological exam: Alert, CN II-XII Intact, Oriented x3





- Psychiatric Exam


Psychiatric exam: Normal Affect, Normal Mood





- Skin


Skin Exam: Dry, Intact, Normal Color, Warm





Discharge Plan





- Discharge Medications


Prescriptions: 


Apixaban [Eliquis] 5 mg PO BID #60 tab


Apixaban [Eliquis] 5 mg PO BID 90 Days #180 tab


Bisoprolol [Zebeta] 5 mg PO DAILY #30 tab


diltiaZEM [Cardizem] 30 mg PO TID #90 tab


diltiaZEM CD [Cardizem CD] 120 mg PO DAILY 90 Days #90 cap


Home Med 1 unit PO DAILY 90 Days #90 ea


Ipratropium 0.02% [Atrovent] 0.5 mg IH RQ4 #100 neb


Isosorbide Mononitrate ER [Imdur ER] 30 mg PO DAILY #30 tab


Levalbuterol [Xopenex] 1.25 mg INH RQ8 PRN #100 neb


 PRN Reason: Shortness Of Breath


Lisinopril [Zestril] 20 mg PO DAILY #60 tab


Lisinopril [Zestril] 20 mg PO DAILY 90 Days #90 tab





- Follow Up Plan


Condition: IMPROVED


Disposition: HOME/ ROUTINE


Patient education suggested?: Yes


Instructions:  Pneumonia, Adult (DC), Oxygen Therapy, Adult (DC), Preventing 

Falls


Referrals: 


Molina Pyle MD [Family Provider] -

## 2019-02-19 NOTE — CP.PCM.PN
Subjective





- Date & Time of Evaluation


Date of Evaluation: 02/19/19


Time of Evaluation: 09:40





- Subjective


Subjective: 





Seen on morning rounds seated in bedside chair.


Appears comfortable, offers no complaints.


Plan for discharge to home later today.


Medications were reviewed and changed for D/C.


Pharmacy contacted to verify all prescriptions were received.


Asked her to contact my office later today or tomorrow morning.


SpO2 on room air 88%, okay to leave w/o supplemental oxygen.


O2 at home via nasal canula at 2 LPM overnight and when seated watching 

television.





Objective





- Vital Signs/Intake and Output


Vital Signs (last 24 hours): 


                                        











Temp Pulse Resp BP Pulse Ox


 


 98.8 F   78   20   148/63   96 


 


 02/19/19 08:00  02/19/19 08:45  02/19/19 08:00  02/19/19 08:45  02/19/19 08:44











- Medications


Medications: 


                               Current Medications





Apixaban (Eliquis)  5 mg PO BID [facility]; Protocol


   Last Admin: 02/19/19 08:44 Dose:  5 mg


Bisoprolol Fumarate (Zebeta)  5 mg PO DAILY [facility]


   Last Admin: 02/19/19 08:46 Dose:  5 mg


Diltiazem HCl (Cardizem)  30 mg PO TID [facility]


   Last Admin: 02/19/19 08:44 Dose:  30 mg


Guaifenesin/Dextromethorphan (Mucinex-Dm 600-30 Mg)  1 tab PO Q12H [facility]


   Last Admin: 02/19/19 08:44 Dose:  1 tab


Ipratropium Bromide (Atrovent)  0.5 mg IH RQID [facility]


   Last Admin: 02/19/19 07:20 Dose:  0.5 mg


Isosorbide Mononitrate (Imdur Er)  30 mg PO DAILY [facility]


   Last Admin: 02/19/19 08:45 Dose:  30 mg


Levalbuterol HCl (Xopenex)  0.63 mg INH RQID [facility]


   Last Admin: 02/19/19 07:20 Dose:  0.63 mg


Levalbuterol HCl (Xopenex)  1.25 mg INH RQ4 PRN


   PRN Reason: Shortness of Breath


   Last Admin: 02/18/19 23:20 Dose:  1.25 mg


Lisinopril (Zestril)  20 mg PO DAILY [facility]


   Last Admin: 02/19/19 08:45 Dose:  20 mg


Nystatin (Nystop Topical Powder)  1 applic TOP TID [facility]


   Last Admin: 02/19/19 08:46 Dose:  1 applic


Pantoprazole Sodium (Protonix Ec Tab)  40 mg PO DAILY [facility]


   Last Admin: 02/19/19 08:45 Dose:  40 mg











- Labs


Labs: 


                                        





                                 02/18/19 07:45 





                                 02/18/19 07:45 











Assessment and Plan


(1) Multinodular thyroid


Status: Chronic   





(2) Pneumonia


Status: Acute   





(3) COPD exacerbation


Status: Chronic   





(4) Hypoxemia requiring supplemental oxygen


Status: Chronic

## 2019-03-02 ENCOUNTER — HOSPITAL ENCOUNTER (INPATIENT)
Dept: HOSPITAL 14 - H.ER | Age: 84
LOS: 6 days | Discharge: NURSING, REHAB OR LONG-TERM CARE | DRG: 299 | End: 2019-03-08
Attending: INTERNAL MEDICINE | Admitting: INTERNAL MEDICINE
Payer: MEDICARE

## 2019-03-02 VITALS — BODY MASS INDEX: 35.8 KG/M2

## 2019-03-02 DIAGNOSIS — I48.0: ICD-10-CM

## 2019-03-02 DIAGNOSIS — I25.10: ICD-10-CM

## 2019-03-02 DIAGNOSIS — Z88.1: ICD-10-CM

## 2019-03-02 DIAGNOSIS — F43.21: ICD-10-CM

## 2019-03-02 DIAGNOSIS — L03.115: ICD-10-CM

## 2019-03-02 DIAGNOSIS — L27.0: ICD-10-CM

## 2019-03-02 DIAGNOSIS — Z79.01: ICD-10-CM

## 2019-03-02 DIAGNOSIS — I50.33: ICD-10-CM

## 2019-03-02 DIAGNOSIS — Z87.891: ICD-10-CM

## 2019-03-02 DIAGNOSIS — E66.01: ICD-10-CM

## 2019-03-02 DIAGNOSIS — Z95.5: ICD-10-CM

## 2019-03-02 DIAGNOSIS — I87.2: Primary | ICD-10-CM

## 2019-03-02 DIAGNOSIS — Z88.0: ICD-10-CM

## 2019-03-02 DIAGNOSIS — L03.116: ICD-10-CM

## 2019-03-02 DIAGNOSIS — Z87.01: ICD-10-CM

## 2019-03-02 DIAGNOSIS — I25.2: ICD-10-CM

## 2019-03-02 DIAGNOSIS — T36.8X5A: ICD-10-CM

## 2019-03-02 DIAGNOSIS — Z99.81: ICD-10-CM

## 2019-03-02 DIAGNOSIS — I11.0: ICD-10-CM

## 2019-03-02 DIAGNOSIS — J43.9: ICD-10-CM

## 2019-03-02 DIAGNOSIS — E78.00: ICD-10-CM

## 2019-03-02 LAB
ANISOCYTOSIS BLD QL SMEAR: SLIGHT
BASOPHILS # BLD AUTO: 0 K/UL (ref 0–0.2)
BASOPHILS NFR BLD: 0.3 % (ref 0–2)
BASOPHILS NFR BLD: 1 % (ref 0–2)
BNP SERPL-MCNC: 253 PG/ML (ref 0–900)
BUN SERPL-MCNC: 18 MG/DL (ref 7–17)
CALCIUM SERPL-MCNC: 9.2 MG/DL (ref 8.4–10.2)
EOSINOPHIL # BLD AUTO: 0.2 K/UL (ref 0–0.7)
EOSINOPHIL NFR BLD: 1.9 % (ref 0–4)
ERYTHROCYTE [DISTWIDTH] IN BLOOD BY AUTOMATED COUNT: 16.2 % (ref 11.5–14.5)
ERYTHROCYTE [SEDIMENTATION RATE] IN BLOOD: > 120 MM/HR (ref 0–30)
GFR NON-AFRICAN AMERICAN: 52
HGB BLD-MCNC: 10.4 G/DL (ref 12–16)
LYMPHOCYTE: 13 % (ref 20–50)
LYMPHOCYTES # BLD AUTO: 0.6 K/UL (ref 1–4.3)
LYMPHOCYTES NFR BLD AUTO: 6.1 % (ref 20–40)
MCH RBC QN AUTO: 27.4 PG (ref 27–31)
MCHC RBC AUTO-ENTMCNC: 31.7 G/DL (ref 33–37)
MCV RBC AUTO: 86.5 FL (ref 81–99)
MONOCYTE: 6 % (ref 0–10)
MONOCYTES # BLD: 0.8 K/UL (ref 0–0.8)
MONOCYTES NFR BLD: 7.4 % (ref 0–10)
NEUTROPHILS # BLD: 8.9 K/UL (ref 1.8–7)
NEUTROPHILS NFR BLD AUTO: 80 % (ref 42–75)
NEUTROPHILS NFR BLD AUTO: 84.3 % (ref 50–75)
NRBC BLD AUTO-RTO: 0 % (ref 0–0)
PLATELET # BLD EST: NORMAL 10*3/UL
PLATELET # BLD: 333 K/UL (ref 130–400)
PMV BLD AUTO: 8.2 FL (ref 7.2–11.7)
RBC # BLD AUTO: 3.8 MIL/UL (ref 3.8–5.2)
TOTAL CELLS COUNTED BLD: 100
WBC # BLD AUTO: 10.5 K/UL (ref 4.8–10.8)

## 2019-03-02 NOTE — ED PDOC
Lower Extremity Pain/Injury


Time Seen by Provider: 03/02/19 16:43


Chief Complaint (Nursing): Lower Extremity Problem/Injury


Chief Complaint (Provider): Lower Extremity Problem/Injury


History Per: Patient


History/Exam Limitations: no limitations


Onset/Duration Of Symptoms: Days


Current Symptoms Are (Timing): Still Present


Additional Complaint(s): 


88 y/o female presents to the ED complaining of leg swelling and redness for one

week. Patient states redness is worsening and swelling has persisted thus 

prompting today's visit. Patient reports of being admitted to the hospital for 

COPD in January 2019. Patient states symptoms began after being discharged from 

the hospital. Patient reports of having chronic shortness of breath due to her 

COPD. Patient notes of being on 2 liters of Oxygen while at home. Otherwise, 

patient denies any new changes, fever and chest pain. 








PMD: Molina Pyle 





Past Medical History


Reviewed: Historical Data, Nursing Documentation, Vital Signs


Vital Signs: 





                                Last Vital Signs











Temp  97.2 F L  03/02/19 16:15


 


Pulse  79   03/02/19 16:15


 


Resp  18   03/02/19 16:15


 


BP  114/68   03/02/19 16:15


 


Pulse Ox  93 L  03/02/19 16:15














- Medical History


PMH: Atrial Fibrillation, CAD, CHF, COPD, Emphysema, Fractures (right ankle), 

HTN, Hypercholesterolemia, Peripheral Edema, Pneumonia


   Denies: Diabetes, HIV, Chronic Kidney Disease





- Surgical History


Surgical History: Coronary Stent (x 2)


   Denies: Appendectomy, Pacemaker





- Family History


Family History: States: Unknown Family Hx





- Home Medications


Home Medications: 


                                Ambulatory Orders











 Medication  Instructions  Recorded


 


Apixaban [Eliquis] 5 mg PO BID #60 tab 02/19/19


 


Bisoprolol [Zebeta] 5 mg PO DAILY #30 tab 02/19/19


 


Isosorbide Mononitrate ER [Imdur 30 mg PO DAILY #30 tab 02/19/19





ER]  


 


Lisinopril [Zestril] 20 mg PO DAILY #60 tab 02/19/19


 


diltiaZEM CD [Cardizem CD] 120 mg PO DAILY 90 Days #90 cap 02/19/19


 


Furosemide [Lasix] 40 mg PO DAILY 03/02/19


 


Rosuvastatin Calcium [Crestor] 20 mg PO DAILY 03/02/19














- Allergies


Allergies/Adverse Reactions: 


                                    Allergies











Allergy/AdvReac Type Severity Reaction Status Date / Time


 


Penicillins Allergy Mild SWELLING Verified 02/06/19 15:47














Review of Systems


ROS Statement: Except As Marked, All Systems Reviewed And Found Negative


Musculoskeletal: Positive for: Leg Pain (SWELLING AND REDNESS)





Physical Exam





- Reviewed


Nursing Documentation Reviewed: Yes


Vital Signs Reviewed: Yes





- Physical Exam


Appears: Positive for: No Acute Distress (comfortable).  Negative for: 

Uncomfortable


Head Exam: Positive for: ATRAUMATIC, NORMOCEPHALIC


Skin: Positive for: Normal Color, Warm, Dry


Eye Exam: Positive for: Normal appearance, EOMI, PERRL


Neck: Positive for: Normal, Painless ROM


Cardiovascular/Chest: Positive for: Regular Rate, Rhythm.  Negative for: Murmur


Respiratory: Positive for: Normal Breath Sounds.  Negative for: Respiratory 

Distress


Gastrointestinal/Abdominal: Positive for: Normal Exam, Soft.  Negative for: 

Tenderness


Back: Positive for: Normal Inspection.  Negative for: L CVA Tenderness, R CVA 

Tenderness, Vertebral Tenderness


Extremity: Positive for: Swelling (bilateral leg swelling and redness ), Other 

(Clear discharge from the bilateral legs present. No open wounds)


Neurologic/Psych: Positive for: Alert, Oriented.  Negative for: Motor/Sensory 

Deficits





- Laboratory Results


Result Diagrams: 


                                 03/03/19 06:14





                                 03/03/19 06:14


Lab Results: 





                                        











NT-Pro-B Natriuret Pep  253 pg/ml (0-900)   03/02/19  17:50    














- ECG


O2 Sat by Pulse Oximetry: 93 (RA)


Pulse Ox Interpretation: Normal





Medical Decision Making


Medical Decision Making: 


Time: 1742


Impression: Bilateral leg swelling and redness


Differentials include but not limited to DVT and leg cellulitis or stasis 

dermatitis


Plan:


-- B-Type Natriuretic 


-- BMP


-- CBC with Differentials


-- Eryhrocyte Sedimentation Rate


-- Vancomycin Inj 1 gm


Sodium Chloride 0.9% 250 ml IVPB


-- Blood Culture


-- US Duplex Lower Extremity Vein Bilaterally





Time: 1848


US RESULTS


FINDINGS: 





DEEP VEINS: 


The common femoral, superficial femoral, and popliteal veins are echolucent and 

compressible. 


There is normal color Doppler flow throughout. 


The visualized calf veins appear patent. 





SUPERFICIAL VEINS: 


The visualized greater saphenous vein is patent. 





SOFT TISSUES: 


There is edema both calf regions present. 





IMPRESSION: 





No deep venous thrombosis evident on bilateral lower extremity examination.  

Edema both calf regions present.





Las reviewed and unremarkable. US negative. 


 


1800 Patient seen by podiatry service at bedside. Per podiatry diagnosis is 

stasis dermatitis, no evidence of cellulitis. 


Patient is stable for discharge as per podiatry service.





1830 Discussed with family of the patient who reports that patient us unable to 

walk at home due to pain.





1900 Discussed with hospitalist Dr Greer for observation for intractable 

pain. 





Electronically signed on Mar 2, 2019 6:48:44 PM EST by:


Miguel Villatoro M.D., Certified by [employer], Diagnostic Radiology





 

________________________________________________________________________________


____


Scribe Attestation:


Documented by Natalia Miller, acting as a scribe for Brian Matias MD. 





Provider Scribe Attestation:


All medical record entries made by the Scribe were at my direction and 

personally dictated by me. I have reviewed the chart and agree that the record 

accurately reflects my personal performance of the history, physical exam, 

medical decision making, and the department course for this patient. I have also

 personally directed, reviewed, and agree with the discharge instructions and 

disposition.





Disposition





- Clinical Impression


Clinical Impression: 


 Leg pain, bilateral, Stasis dermatitis








- Patient ED Disposition


Is Patient to be Admitted: Yes


Discussed With DrGeno: Daniel Greer


Doctor Will See Patient In The: ED


Counseled Patient/Family Regarding: Studies Performed, Diagnosis





- Disposition


Disposition Time: 18:00


Condition: FAIR





- Pt Status Changed To:


Hospital Disposition Of: Observation





- POA


Present On Arrival: None

## 2019-03-02 NOTE — CP.PCM.HP
<Pasha Boyd - Last Filed: 03/02/19 21:51>





History of Present Illness





- History of Present Illness


History of Present Illness: 





88 y/o F with a PMHx of COPD, CAD, CHF, paroxysmal AFib and chronic b/l leg 

dermatitis and swelling was brought by her family to ED due to aggravating 

bilateral lower leg redness and swelling. Pt explains that since her recent 

hospital discharge, ~2 weeks ago, the redness and swelling from both legs have 

greatly worsened and became painful. Pain is described as  dull, constant, 10/10

intensity, now 8/10 intensity after Percocet. Pt reports that skin on b/l lower 

leg is breaking apart. Pt denies chest pain, worsening of her SOB, wheezing, 

fever, chills, sweating, nausea, vomiting, recent trauma or insect bites.


--Pt reports medications were modified on last admission a few weeks ago. 





PMD: Dr Pyle





>Allergies: Penicillins


>Meds: Lasix 40mg PO daily, Crestor 20mg PO, Bisoprolol 5mg PO daily, Lisinopril

20mg PO daily, Elliquis 5mg PO BID, Imdur ER 30mg PO daily, Cardizem 120mg PO 

daily. 





>PMHx: COPD, CAD, CHF, paroxysmal AFib and chronic b/l leg dermatitis 


>PSHx: Cardiac stent placement 2 years ago. 


>SHx: Stopped tobacco 15 years ago, used to smoke 1.5 ppdfor >50 years. No 

alcohol and no rec drugs. 








ED Course:


--Vital signs: WNL except for sat O2 of 93%. 


--CBC with NO leukocyotis. 


--ESR >120. Pro--wnl. 


--US dupplex of b/l LE: NO DVT evidence. 


--Vancomycin 1gr IV administered at ED








Present on Admission





- Present on Admission


Any Indicators Present on Admission: No





Review of Systems





- Constitutional


Constitutional: absent: Anorexia, Chills, Weight Loss





- EENT


Nose/Mouth/Throat: absent: Nasal Congestion, Nasal Discharge, Sore Throat, Neck 

Pain





- Cardiovascular


Cardiovascular: absent: Chest Pain, Chest Pain at Rest





- Respiratory


Respiratory: Dyspnea (baseline).  absent: Hemoptysis, Pain on Inspiration





- Gastrointestinal


Gastrointestinal: absent: Abdominal Pain, Diarrhea, Nausea, Vomiting





- Genitourinary


Genitourinary: absent: Dysuria, Flank Pain, Urinary Frequency





Past Patient History





- Infectious Disease


Hx of Infectious Diseases: None





- Tetanus Immunizations


Tetanus Immunization: Unknown





- Past Medical History & Family History


Past Medical History?: Yes





- Past Social History


Smoking Status: Former Smoker





- CARDIAC


Hx Atrial Fibrillation: Yes


Hx Congestive Heart Failure: Yes


Hx Hypercholesterolemia: Yes


Hx Hypertension: Yes


Hx Pacemaker: No


Hx Peripheral Edema: Yes





- PULMONARY


Hx Chronic Obstructive Pulmonary Disease (COPD): Yes


Hx Emphysema: Yes


Hx Pneumonia: Yes





- NEUROLOGICAL


Hx Syncope: Yes


Other/Comment: AMS on recent admission.





- HEENT


Hx HEENT Problems: No





- RENAL


Hx Chronic Kidney Disease: No





- ENDOCRINE/METABOLIC


Other/Comment: Multinodular thyroid.





- HEMATOLOGICAL/ONCOLOGICAL


Hx Human Immunodeficiency Virus (HIV): No





- INTEGUMENTARY


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





- MUSCULOSKELETAL/RHEUMATOLOGICAL


Hx Fractures: Yes (right ankle)





- GASTROINTESTINAL


Hx Gastrointestinal Disorders: No





- GENITOURINARY/GYNECOLOGICAL


Hx Incontinence: Yes


Other/Comment: Stress Incontinence





- PSYCHIATRIC


Hx Psychophysiologic Disorder: No


Hx Substance Use: No





- SURGICAL HISTORY


Hx Appendectomy: No


Hx Coronary Stent: Yes (x 2)





- ANESTHESIA


Hx Anesthesia: Yes


Hx Anesthesia Reactions: No


Hx Malignant Hyperthermia: No





Meds


Allergies/Adverse Reactions: 


                                    Allergies











Allergy/AdvReac Type Severity Reaction Status Date / Time


 


Penicillins Allergy Mild SWELLING Verified 02/06/19 15:47














Physical Exam





- Constitutional


Appears: No Acute Distress





- Head Exam


Head Exam: ATRAUMATIC, NORMAL INSPECTION





- Eye Exam


Eye Exam: EOMI





- ENT Exam


ENT Exam: Mucous Membranes Moist





- Neck Exam


Neck exam: Positive for: Full Rom, Normal Inspection.  Negative for: Meningismus





- Respiratory Exam


Respiratory Exam: NORMAL BREATHING PATTERN.  absent: Rales, Rhonchi, Wheezes





- Cardiovascular Exam


Cardiovascular Exam: +S1, +S2





- GI/Abdominal Exam


GI & Abdominal Exam: Soft.  absent: Distended, Rebound, Rigid, Tenderness





- Extremities Exam


Additional comments: 





Bilateral lower legs: presence of diffuse erythema at complete circumference 

from low level of knees to all toes, presence of tenderness and very painful on 

elevation of b/l feet. Pulses diminished in b/l DP.  B/L lower leg currently on 

clean/dry/intact dressings. 








- Neurological Exam


Neurological exam: Alert, Oriented x3





Results





- Vital Signs


Recent Vital Signs: 





                                Last Vital Signs











Temp  99.3 F   03/02/19 19:19


 


Pulse  77   03/02/19 19:19


 


Resp  18   03/02/19 19:19


 


BP  135/60   03/02/19 19:19


 


Pulse Ox  95   03/02/19 19:19














- Labs


Result Diagrams: 


                                 03/02/19 17:50





                                 03/02/19 17:50


Labs: 





                         Laboratory Results - last 24 hr











  03/02/19 03/02/19





  17:50 17:50


 


WBC   10.5


 


RBC   3.80


 


Hgb   10.4 L


 


Hct   32.9 L


 


MCV   86.5


 


MCH   27.4


 


MCHC   31.7 L


 


RDW   16.2 H


 


Plt Count   333


 


MPV   8.2


 


Neut % (Auto)   84.3 H


 


Lymph % (Auto)   6.1 L


 


Mono % (Auto)   7.4


 


Eos % (Auto)   1.9


 


Baso % (Auto)   0.3


 


Neut # (Auto)   8.9 H


 


Lymph # (Auto)   0.6 L


 


Mono # (Auto)   0.8


 


Eos # (Auto)   0.2


 


Baso # (Auto)   0.0


 


Neutrophils % (Manual)   80 H


 


Lymphocytes % (Manual)   13 L


 


Monocytes % (Manual)   6


 


Basophils % (Manual)   1


 


Platelet Estimate   Normal


 


Anisocytosis (manual)   Slight


 


ESR   > 120 H


 


Sodium  140 


 


Potassium  4.2 


 


Chloride  94 L 


 


Carbon Dioxide  33 H 


 


Anion Gap  17 


 


BUN  18 H 


 


Creatinine  1.0 


 


Est GFR ( Amer)  > 60 


 


Est GFR (Non-Af Amer)  52 


 


Random Glucose  191 H 


 


Calcium  9.2 


 


NT-Pro-B Natriuret Pep  253 














Assessment & Plan





- Assessment and Plan (Free Text)


Assessment: 





88 y/o F with a PMHx of COPD, CAD, CHF, paroxysmal AFib and chronic b/l leg 

dermatitis and swelling admitted for evaluation and examination of intractable 

b/l leg pain and aggravating bilateral lower leg redness and swelling. 





PLAN: 





>Bilateral leg dermatitis


--Aggravating, acute on chronic


--Afebrile, VS stable, no leukocytosis.


--Possibly CHF exacerbation vs exacerbation of venous statis vs medication side 

effect (CCB's)


--Podiatry consult, Dr Barrera.


--Furosemide 40mg STAT dose


--F/U Intake and Output


--F/U renal function and electrolytes tomorrow's lab. 





>COPD


--Chronic


--On Oxygen by NC at home 


--Currently O2 by NC at 2 L/min


--Pulmonology consult, Dr Sandoval, as per ED. 


--F/U recommendations





>Acute CHF exacerbation - preserved EF


--S/P stent placed. 


--Last echocardiogram on 02/04/19: LVEF 50-55%.


--Furosemide 40mg IV STAT


--F/U BMP for tomorrow. 





>Paroxysmal Afib 


--Rate controlled


--Continue with home meds





>Essential HTN


--Continue home meds





>DVT prophylaxis


--On Eliquis BID





>Diet


--Heart healthy diet








- Date & Time


Date: 03/02/19


Time: 21:55





<Daniel Greer - Last Filed: 03/02/19 23:29>





Results





- Vital Signs


Recent Vital Signs: 





                                Last Vital Signs











Temp  99.0 F   03/02/19 21:40


 


Pulse  78   03/02/19 21:40


 


Resp  17   03/02/19 21:40


 


BP  121/49 L  03/02/19 21:54


 


Pulse Ox  92 L  03/02/19 21:40














- Labs


Result Diagrams: 


                                 03/02/19 17:50





                                 03/02/19 17:50


Labs: 





                         Laboratory Results - last 24 hr











  03/02/19 03/02/19





  17:50 17:50


 


WBC   10.5


 


RBC   3.80


 


Hgb   10.4 L


 


Hct   32.9 L


 


MCV   86.5


 


MCH   27.4


 


MCHC   31.7 L


 


RDW   16.2 H


 


Plt Count   333


 


MPV   8.2


 


Neut % (Auto)   84.3 H


 


Lymph % (Auto)   6.1 L


 


Mono % (Auto)   7.4


 


Eos % (Auto)   1.9


 


Baso % (Auto)   0.3


 


Neut # (Auto)   8.9 H


 


Lymph # (Auto)   0.6 L


 


Mono # (Auto)   0.8


 


Eos # (Auto)   0.2


 


Baso # (Auto)   0.0


 


Neutrophils % (Manual)   80 H


 


Lymphocytes % (Manual)   13 L


 


Monocytes % (Manual)   6


 


Basophils % (Manual)   1


 


Platelet Estimate   Normal


 


Anisocytosis (manual)   Slight


 


ESR   > 120 H


 


Sodium  140 


 


Potassium  4.2 


 


Chloride  94 L 


 


Carbon Dioxide  33 H 


 


Anion Gap  17 


 


BUN  18 H 


 


Creatinine  1.0 


 


Est GFR ( Amer)  > 60 


 


Est GFR (Non-Af Amer)  52 


 


Random Glucose  191 H 


 


Calcium  9.2 


 


NT-Pro-B Natriuret Pep  253 














Assessment & Plan





- Assessment and Plan (Free Text)


Plan: 





History as documented by resident was reviewed with patient and resident. I 

personally performed the key elements of physical exam and agree with the above 

findings. Diagnostics reviewed. X-ray and EKG as above interpreted by me. 

Medical decision making and plan of care performed by me. 86 yo morbidly obese 

(BMI 48) CF with extensive hx including HTN, HLD, CAD s/p PCI, PAF on Eliquis, 

chronic HFpEF, COPD with chronic hypoxic respiratory failure on home O2 recently

discharged from this center to TCU and from there home after being treated for 

A-fib RVR and COPD exacerbation due to pneumonia p/w increasing b/l LE swelling 

and pain. Chronic problem but significantly worsened since discharge to the 

point she is not able to ambulate due to pain. On exam b/l LE is pretty edematus

and tender and erythematous. No evidence of infection. Possible fluid overload 

vs medication side effect. US LE negative for DVT. Will give IV Lasix with close

monitoring of hemodynamic and respiratory status, renal function, electrolytes, 

I&O.

## 2019-03-02 NOTE — CP.PCM.CON
History of Present Illness





- History of Present Illness


History of Present Illness: 





Podiatry Consult Note: Dr. Barrera





87 year old female patient, with PMHx of COPD, CHF, CAD, seen and evaluated in 

the ED for b/l lower extremity edema and erythema. Patient states that her 

son-in-law noticed her legs were getting more red and swollen over the past week

and brought her to the hospital for evaluation.  She states that her legs often 

throb when she ambulates and occasionally weep causing her slipper to get wet. 

She notes that she sits at home in her chair with her legs in the dependent 

position. Patient's family states that she has an existing appointment with a 

podiatrist who makes home visits on Monday. Patient's son in law and daughter 

present at bedside. Denies nausea/vomiting/fever/chest pain/chills, admits to 

shortness of breath secondary to COPD and is on oxygen at home.





PMHx: COPD, CHF, CAD


PSHx: Stent placement 


SH: Former tobacco use, denies alcohol use and ilicit drug use. Ambulates with 

walker  


ALL:  PCN











Review of Systems





- Review of Systems


Review of Systems: 





10 point review of systems negative except for what is mentioned in HPI 





Past Patient History





- Infectious Disease


Hx of Infectious Diseases: None





- Tetanus Immunizations


Tetanus Immunization: Unknown





- Past Medical History & Family History


Past Medical History?: Yes





- Past Social History


Smoking Status: Former Smoker





- CARDIAC


Hx Atrial Fibrillation: Yes


Hx Congestive Heart Failure: Yes


Hx Hypercholesterolemia: Yes


Hx Hypertension: Yes


Hx Pacemaker: No


Hx Peripheral Edema: Yes





- PULMONARY


Hx Chronic Obstructive Pulmonary Disease (COPD): Yes


Hx Emphysema: Yes


Hx Pneumonia: Yes





- NEUROLOGICAL


Hx Syncope: Yes


Other/Comment: AMS on recent admission.





- HEENT


Hx HEENT Problems: No





- RENAL


Hx Chronic Kidney Disease: No





- ENDOCRINE/METABOLIC


Other/Comment: Multinodular thyroid.





- HEMATOLOGICAL/ONCOLOGICAL


Hx Human Immunodeficiency Virus (HIV): No





- INTEGUMENTARY


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





- MUSCULOSKELETAL/RHEUMATOLOGICAL


Hx Fractures: Yes (right ankle)





- GASTROINTESTINAL


Hx Gastrointestinal Disorders: No





- GENITOURINARY/GYNECOLOGICAL


Hx Incontinence: Yes


Other/Comment: Stress Incontinence





- PSYCHIATRIC


Hx Psychophysiologic Disorder: No


Hx Substance Use: No





- SURGICAL HISTORY


Hx Appendectomy: No


Hx Coronary Stent: Yes (x 2)





- ANESTHESIA


Hx Anesthesia: Yes


Hx Anesthesia Reactions: No


Hx Malignant Hyperthermia: No





Meds


Allergies/Adverse Reactions: 


                                    Allergies











Allergy/AdvReac Type Severity Reaction Status Date / Time


 


Penicillins Allergy Mild SWELLING Verified 02/06/19 15:47














Physical Exam





- Constitutional


Appears: Non-toxic, No Acute Distress





- Head Exam


Head Exam: ATRAUMATIC, NORMOCEPHALIC





- Extremities Exam


Additional comments: 





Vascular: DP/PT 1/4, CFT < 3 seconds, TG warm to warm, + 2 pitting edema 

appreciated to bilateral lower extremities


Ortho: Pain with palpation of posterior aspect of b/l lower extremities, MMT 3/5




Neuro: Gross and protective sensation intact


Derm: B/L erythema to b/l lower extremities circumfrentially beginning at tibial

tuberosity extending down to digits, which decrease in erythema when elevated. 

No open lesions at this time, no weeping or drainage appreciated. No purulence, 

nor streaking appreciated. Elongated, dystrophic nails  





- Neurological Exam


Neurological exam: Alert, Oriented x3





- Psychiatric Exam


Psychiatric exam: Normal Affect, Normal Mood





Results





- Vital Signs


Recent Vital Signs: 


                                Last Vital Signs











Temp  99.3 F   03/02/19 19:19


 


Pulse  77   03/02/19 19:19


 


Resp  18   03/02/19 19:19


 


BP  135/60   03/02/19 19:19


 


Pulse Ox  95   03/02/19 19:19














- Labs


Result Diagrams: 


                                 03/02/19 17:50





                                 03/02/19 17:50


Labs: 


                         Laboratory Results - last 24 hr











  03/02/19 03/02/19





  17:50 17:50


 


WBC   10.5


 


RBC   3.80


 


Hgb   10.4 L


 


Hct   32.9 L


 


MCV   86.5


 


MCH   27.4


 


MCHC   31.7 L


 


RDW   16.2 H


 


Plt Count   333


 


MPV   8.2


 


Neut % (Auto)   84.3 H


 


Lymph % (Auto)   6.1 L


 


Mono % (Auto)   7.4


 


Eos % (Auto)   1.9


 


Baso % (Auto)   0.3


 


Neut # (Auto)   8.9 H


 


Lymph # (Auto)   0.6 L


 


Mono # (Auto)   0.8


 


Eos # (Auto)   0.2


 


Baso # (Auto)   0.0


 


Neutrophils % (Manual)   80 H


 


Lymphocytes % (Manual)   13 L


 


Monocytes % (Manual)   6


 


Basophils % (Manual)   1


 


Platelet Estimate   Normal


 


Anisocytosis (manual)   Slight


 


Sodium  140 


 


Potassium  4.2 


 


Chloride  94 L 


 


Carbon Dioxide  33 H 


 


Anion Gap  17 


 


BUN  18 H 


 


Creatinine  1.0 


 


Est GFR ( Amer)  > 60 


 


Est GFR (Non-Af Amer)  52 


 


Random Glucose  191 H 


 


Calcium  9.2 


 


NT-Pro-B Natriuret Pep  253 














Assessment & Plan





- Assessment and Plan (Free Text)


Assessment: 





87 year old female patient, with PMHx of COPD, CHF, CAD, with b/l lower 

extremity edema and erythema


Plan: 





Patient seen and evaluated 


Discussed patient plan in detail with Dr. Barrera


[place], VSS, absent leukocytosis , ESR pending 


B/L lower extremities dressed with DSD and ACE for mild edema control, no open 

wounds or weeping at this time 


Educated patient on importance of elevating lower extremities at home to prevent

swelling 


LE US; negative for DVT 


Blood cultures taken; f/u results


Recommend PO abx for prophylactic therapy at this time 


C/w pain management as needed 


Surgical shoes dispensed to patient 


Patient to follow up with her podiatrist as planned on Monday or in the podiatry

clinic within 1 week of discharge for continued care 


Thank you for the consult and allowing us to partake in the care of this patient





- Date & Time


Date: 03/02/19


Time: 21:34

## 2019-03-03 LAB
BUN SERPL-MCNC: 16 MG/DL (ref 7–17)
CALCIUM SERPL-MCNC: 8.6 MG/DL (ref 8.4–10.2)
ERYTHROCYTE [DISTWIDTH] IN BLOOD BY AUTOMATED COUNT: 15.9 % (ref 11.5–14.5)
GFR NON-AFRICAN AMERICAN: 42
HGB BLD-MCNC: 9.6 G/DL (ref 12–16)
MCH RBC QN AUTO: 27.9 PG (ref 27–31)
MCHC RBC AUTO-ENTMCNC: 32.3 G/DL (ref 33–37)
MCV RBC AUTO: 86.2 FL (ref 81–99)
PLATELET # BLD: 288 K/UL (ref 130–400)
RBC # BLD AUTO: 3.46 MIL/UL (ref 3.8–5.2)
WBC # BLD AUTO: 8.5 K/UL (ref 4.8–10.8)

## 2019-03-03 RX ADMIN — DILTIAZEM HYDROCHLORIDE SCH MG: 120 CAPSULE, COATED, EXTENDED RELEASE ORAL at 08:55

## 2019-03-03 NOTE — CP.PCM.PN
Subjective





- Date & Time of Evaluation


Date of Evaluation: 03/03/19


Time of Evaluation: 10:45





- Subjective


Subjective: 








Pt complains of leg swelling


sl left foot pain


no fever


denies CP


no SOB


no abd pain








Objective





- Vital Signs/Intake and Output


Vital Signs (last 24 hours): 


                                        











Temp Pulse Resp BP Pulse Ox


 


 98.1 F   67   19   129/66   93 L


 


 03/03/19 09:00  03/03/19 09:00  03/03/19 09:00  03/03/19 09:00  03/03/19 09:56








Intake and Output: 


                                        











 03/03/19 03/03/19





 06:59 18:59


 


Output Total 600 


 


Balance -600 














- Medications


Medications: 


                               Current Medications





Acetaminophen (Tylenol 325mg Tab)  650 mg PO Q6 PRN


   PRN Reason: Pain, moderate (4-7)


Acetaminophen (Tylenol 325mg Tab)  325 mg PO Q6 PRN


   PRN Reason: Pain, Mild (1-3)


Albuterol/Ipratropium (Duoneb 3 Mg/0.5 Mg (3 Ml) Ud)  3 ml INH RQ6 PRN


   PRN Reason: Shortness of Breath


Apixaban (Eliquis)  5 mg PO BID [facility]; Protocol


   Last Admin: 03/03/19 08:55 Dose:  5 mg


Atorvastatin Calcium (Lipitor)  40 mg PO DAILY [facility]


   Last Admin: 03/03/19 08:56 Dose:  40 mg


Bisoprolol Fumarate (Zebeta)  5 mg PO DAILY [facility]


   Last Admin: 03/03/19 08:56 Dose:  5 mg


Diltiazem HCl (Cardizem Cd)  120 mg PO DAILY [facility]


   Last Admin: 03/03/19 08:55 Dose:  120 mg


Furosemide (Lasix)  40 mg PO DAILY [facility]


   Last Admin: 03/03/19 08:56 Dose:  40 mg


Vancomycin HCl 500 mg/ Sodium (Chloride)  100 mls @ 100 mls/hr IVPB Q12 [facility]; 

Protocol


Isosorbide Mononitrate (Imdur Er)  30 mg PO DAILY [facility]


   Last Admin: 03/03/19 08:56 Dose:  30 mg


Lisinopril (Zestril)  20 mg PO DAILY [facility]


   Last Admin: 03/03/19 08:57 Dose:  20 mg


Oxycodone/Acetaminophen (Percocet 5/325 Mg Tab)  1 tab PO Q4 PRN


   PRN Reason: Pain, severe (8-10)


   Stop: 03/05/19 21:44











- Labs


Labs: 


                                        





                                 03/03/19 06:14 





                                 03/03/19 06:14 











- Constitutional


Appears: No Acute Distress





- Head Exam


Head Exam: NORMAL INSPECTION, NORMOCEPHALIC





- Eye Exam


Eye Exam: EOMI, Normal appearance


Pupil Exam: NORMAL ACCOMODATION





- ENT Exam


ENT Exam: Mucous Membranes Moist, Normal External Ear Exam





- Neck Exam


Neck Exam: Full ROM.  absent: Meningismus





- Respiratory Exam


Respiratory Exam: Wheezes (slight expiratory wheeze), NORMAL BREATHING PATTERN. 

absent: Respiratory Distress





- Cardiovascular Exam


Cardiovascular Exam: REGULAR RHYTHM, +S1, +S2





- GI/Abdominal Exam


GI & Abdominal Exam: Soft.  absent: Tenderness





- Extremities Exam


Extremities Exam: Normal Capillary Refill, Pedal Edema.  absent: Calf Tenderness


Additional comments: 





Bilateral LE wrapped in ACE bandage and elevated with pillow





- Back Exam


Back Exam: absent: CVA tenderness (L), CVA tenderness (R)





- Neurological Exam


Neurological Exam: Alert, Awake, Oriented x3





- Psychiatric Exam


Psychiatric exam: Normal Affect, Normal Mood





- Skin


Skin Exam: Normal Color, Warm


Additional comments: 





few scattered erythematous rash on thigh 





Assessment and Plan





- Assessment and Plan (Free Text)


Plan: 





1. Bilateral  Lower extremity Stasis dermatitis with left  cellulitis


-chronic stasis dermatitis , aggravated by edema and now with some cellulitis


-Pt was started on IV Vanco in ED, will continue however will adjust to renal 

dose 


- received stat IV dose of IV Lasix


- Podiatry consulted  


- keep leg elevated


- Podiatry wrapped ACE bandage on both LE to decrease edema





2. Chronic COPD


--On Oxygen by NC at home , cont 2 LPM


--Pulmonology consult, Dr Sandoval, as per ED. 


- pt has sl wheeze


- cont Duoneb 





3. Acute on chronic CHF exacerbation diastolic dysfunction- preserved EF


--Last echocardiogram on 02/04/19: LVEF 50-55%.


--Furosemide 40mg IV STAT


- cont PO Lasix, Lisinopril, Imdur and Bisoprolol





4. Paroxysmal Afib 


--Rate controlled


--Continue with Cardizem, Bisoprolol and Eliquis








>DVT prophylaxis


--On Eliquis BID





>Diet


--Heart healthy diet

## 2019-03-03 NOTE — CON
DATE:  03/03/2019



HISTORY OF PRESENT ILLNESS:  Mrs. Charles is an 87-year-old female who was

referred for pulmonary followup and evaluation.  She was seen in the

emergency room and admitted because of swelling of legs with difficulty

ambulating and some redness as per family.  She was recently discharged

from [facility] after therapy for COPD exacerbation,

congestive heart failure, and paroxysmal atrial fibrillation.  She has a

history of chronic swelling of both lower extremities with what appears to

be dermatitis.  Presently, she denies chest pains or shortness of breath

and is insisted on going home today because she was promised she would stay

in hospital for 24 hours and then discharged.  She is the patient of Dr. Pyle and I am covering for Dr. Pyle from the pulmonary viewpoint.



PAST MEDICAL HISTORY:  She has a past medical history of chronic

obstructive pulmonary disease, coronary artery disease, congestive heart

failure, paroxysmal atrial fibrillation, and chronic pedal edema.



FAMILY HISTORY:  Nonrevealing.



SOCIAL HISTORY:  She quit smoking 15 years ago.  Does not drink and lives

alone at home, but has a supportive family.



PHYSICAL EXAMINATION:

GENERAL:  The patient is alert and oriented, appears to be comfortable at

present.

VITAL SIGNS:  On admission, blood pressure 135/60 with a pulse of 77,

respiratory rate 18, temperature maximum was 99.3 degrees Fahrenheit, O2

sat 95% on nasal cannula oxygen.

SKIN:  Shows fair turgor.

HEENT:  Pupils are equal and reactive to light and accommodation.  JVP

flat.  Mouth shows fair hygiene.

LUNGS:  Fair aeration bilaterally.  Few scattered basilar rales.

HEART:  Irregular rhythm.

ABDOMEN:  Soft, nontender, no organomegaly.

EXTREMITIES:  Show bilateral chronic edema of both lower extremities with

what appears to be stasis dermatitis.



LABORATORY DATA:  The rest of the labs have been reviewed.



IMPRESSION:  An 87-year-old female with history of chronic stasis

dermatitis of both lower extremities, chronic obstructive pulmonary

disease, appears to be stable at present, history of coronary artery

disease, cardiac arrhythmias, and history of congestive heart failure in

the past.



PLAN:  The plan presently would be to continue therapy as ordered as well

as bronchodilators to be given p.r.n. and further intervention from the

pulmonary point of view would be oxygen 2 liters per minute via nasal

cannula, was discussed further care with treating medical team.







__________________________________________

Lewis Sandoval MD





DD:  03/03/2019 10:52:51

DT:  03/03/2019 10:54:46

[facility] # [id number]

## 2019-03-03 NOTE — US
Date of service: 



03/02/2019



PROCEDURE:  Bilateral lower extremity venous duplex Doppler. 



HISTORY:

LOWER bilaterael lower leg swelling



COMPARISON:

None available. 



TECHNIQUE:

Bilateral common femoral, superficial femoral, popliteal and 

posterior tibial veins were evaluated. Flow was assessed with color 

Doppler, compressibility, assessment of phasic flow and augmentation 

response. 



FINDINGS:



COMMON FEMORAL VEIN:

Right CFV:  Unremarkable.



Left CFV:  Unremarkable.



SUPERFICIAL FEMORAL VEIN:

Right SFV: Unremarkable.



Left SFV:  Unremarkable.



POPLITEAL VEIN:

Right Popliteal:  Unremarkable.



Left Popliteal:  Unremarkable.



POSTERIOR TIBIAL VEIN:

Right PTV: Unremarkable.



Left PTV: Unremarkable.



OTHER FINDINGS:

None.



IMPRESSION:

No evidence of deep venous thrombosis.

## 2019-03-04 LAB
BASOPHILS # BLD AUTO: 0 K/UL (ref 0–0.2)
BASOPHILS NFR BLD: 0.2 % (ref 0–2)
EOSINOPHIL # BLD AUTO: 0.5 K/UL (ref 0–0.7)
EOSINOPHIL NFR BLD: 4.1 % (ref 0–4)
ERYTHROCYTE [DISTWIDTH] IN BLOOD BY AUTOMATED COUNT: 15.9 % (ref 11.5–14.5)
HGB BLD-MCNC: 10.9 G/DL (ref 12–16)
LYMPHOCYTES # BLD AUTO: 0.6 K/UL (ref 1–4.3)
LYMPHOCYTES NFR BLD AUTO: 5.7 % (ref 20–40)
MCH RBC QN AUTO: 27.4 PG (ref 27–31)
MCHC RBC AUTO-ENTMCNC: 32.1 G/DL (ref 33–37)
MCV RBC AUTO: 85.4 FL (ref 81–99)
MONOCYTES # BLD: 0.5 K/UL (ref 0–0.8)
MONOCYTES NFR BLD: 4.7 % (ref 0–10)
NEUTROPHILS # BLD: 9.6 K/UL (ref 1.8–7)
NEUTROPHILS NFR BLD AUTO: 85.3 % (ref 50–75)
NRBC BLD AUTO-RTO: 0 % (ref 0–0)
PLATELET # BLD: 399 K/UL (ref 130–400)
PMV BLD AUTO: 8.1 FL (ref 7.2–11.7)
RBC # BLD AUTO: 3.96 MIL/UL (ref 3.8–5.2)
WBC # BLD AUTO: 11.2 K/UL (ref 4.8–10.8)

## 2019-03-04 RX ADMIN — DILTIAZEM HYDROCHLORIDE SCH MG: 120 CAPSULE, COATED, EXTENDED RELEASE ORAL at 09:10

## 2019-03-04 NOTE — CP.PCM.PN
Subjective





- Date & Time of Evaluation


Date of Evaluation: 03/04/19 (])


Time of Evaluation: 09:19





- Subjective


Subjective: 





Podiatry Consult Note: Dr. Barrera





87 year old female patient seen and evauated for b/l lower extremity chronic 

stasis edema, dermatitis with cellulitis. Patient states that she has some pain 

in her legs. She denies any overnight nausea/vomiting/fever/chest pain/chills, 

or shortness of breath.


























Objective





- Vital Signs/Intake and Output


Vital Signs (last 24 hours): 


                                        











Temp Pulse Resp BP Pulse Ox


 


 97.8 F   67   20   134/72   92 L


 


 03/04/19 08:28  03/04/19 09:12  03/04/19 08:28  03/04/19 09:12  03/04/19 08:28











- Medications


Medications: 


                               Current Medications





Acetaminophen (Tylenol 325mg Tab)  650 mg PO Q6 PRN


   PRN Reason: Pain, moderate (4-7)


Acetaminophen (Tylenol 325mg Tab)  325 mg PO Q6 PRN


   PRN Reason: Pain, Mild (1-3)


Albuterol/Ipratropium (Duoneb 3 Mg/0.5 Mg (3 Ml) Ud)  3 ml INH RQ6 PRN


   PRN Reason: Shortness of Breath


Apixaban (Eliquis)  5 mg PO BID [facility]; Protocol


   Last Admin: 03/04/19 09:11 Dose:  5 mg


Atorvastatin Calcium (Lipitor)  40 mg PO DAILY [facility]


   Last Admin: 03/04/19 09:12 Dose:  40 mg


Bisoprolol Fumarate (Zebeta)  5 mg PO DAILY [facility]


   Last Admin: 03/04/19 09:11 Dose:  5 mg


Diltiazem HCl (Cardizem Cd)  120 mg PO DAILY [facility]


   Last Admin: 03/04/19 09:10 Dose:  120 mg


Furosemide (Lasix)  40 mg PO DAILY [facility]


   Last Admin: 03/04/19 09:11 Dose:  40 mg


Vancomycin HCl 500 mg/ Sodium (Chloride)  100 mls @ 100 mls/hr IVPB Q12 [facility]; 

Protocol


   Last Admin: 03/04/19 09:13 Dose:  100 mls/hr


Isosorbide Mononitrate (Imdur Er)  30 mg PO DAILY [facility]


   Last Admin: 03/04/19 09:12 Dose:  30 mg


Lisinopril (Zestril)  20 mg PO DAILY [facility]


   Last Admin: 03/04/19 09:12 Dose:  20 mg


Oxycodone/Acetaminophen (Percocet 5/325 Mg Tab)  1 tab PO Q4 PRN


   PRN Reason: Pain, severe (8-10)


   Stop: 03/05/19 21:44











- Labs


Labs: 


                                        





                                 03/03/19 06:14 





                                 03/03/19 06:14 











- Constitutional


Appears: No Acute Distress





- Head Exam


Head Exam: ATRAUMATIC, NORMOCEPHALIC





- Extremities Exam


Additional comments: 





Vascular: DP/PT 1/4, Cap refill < 3 seconds, Temp gradient warm to warm, + 1 

pitting edema appreciated to bilateral lower extremities, Edema is improving.





Neuro: Gross and protective sensation intact.





Derm: B/L erythema to b/l lower extremities circumfrentially beginning at tibial

tuberosity extending down to digits, which decrease in erythema when elevated. 

No open lesions at this time, no weeping or drainage appreciated. No purulence, 

nor streaking appreciated. Elongated, dystrophic nails.





MSK: Pain with palpation of posterior aspect of b/l lower extremities, MMT 4/5 

in all groups b/l.


 














- Neurological Exam


Neurological Exam: Alert, Awake





Assessment and Plan





- Assessment and Plan (Free Text)


Assessment: 





87 year old female patient seen and evauated for b/l lower extremity chronic 

stasis edema, dermatitis with cellulitis. 











Plan: 








Patient seen and evaluated 


Discussed patient plan in detail with Dr. Barrera


Charts, labs and vitals reviewed: Afebrile, absent leukocytosis ,  


Emollient cream and Ace bandage applied to lower extremities  no open wounds or 

weeping at this time 


Patient to continue elevating lower extremities.


LE US; negative for DVT 


Blood cultures taken; No growth after 24 hours


Continue IV abx as per primary team.


C/W pain management as needed. 


Ordered Ammonium lactate 12% lotion to be applied BID topical.


Continue wearing surgical shoes when ambulating.


Podiatry will continue to follow up the patient upon discharge.

## 2019-03-04 NOTE — RAD
Date of service: 



03/04/2019



HISTORY:

 SOB 



COMPARISON:

Chest radiographs 02/02/2019.



FINDINGS:



LUNGS:

No active pulmonary disease.



PLEURA:

No significant pleural effusion identified, no pneumothorax apparent.



CARDIOVASCULAR:

Calcific atherosclerotic changes are seen related to the thoracic 

aorta.



 Stable cardiomegaly.  No pulmonary vascular congestion. 



OSSEOUS STRUCTURES:

No significant abnormalities.



VISUALIZED UPPER ABDOMEN:

Normal.



OTHER FINDINGS:

None.



IMPRESSION:

Cardiomegaly.  No pulmonary vascular congestion, infiltrate or 

pleural effusion bilaterally.

## 2019-03-04 NOTE — CP.PCM.PN
Subjective





- Date & Time of Evaluation


Date of Evaluation: 03/04/19


Time of Evaluation: 09:45





- Subjective


Subjective: 





Patient seen and examined today at bedside, in NAD, daughter is at bedside. 

Patient today presents with an eryhtematous patchy rash that extends to the 

thighs, trunk and internal aspect of upper extremities. As per daughter the 

mother had some redness to the LE, but she started with the rash yesterday in 

the legs and has advanced today, pt otherwise states feeling well, denies SOB, 

CP, N/V/D. She remains afebrile. 





Objective





- Vital Signs/Intake and Output


Vital Signs (last 24 hours): 


                                        











Temp Pulse Resp BP Pulse Ox


 


 97.8 F   67   20   134/72   92 L


 


 03/04/19 08:28  03/04/19 09:12  03/04/19 08:28  03/04/19 09:12  03/04/19 08:28











- Medications


Medications: 


                               Current Medications





Acetaminophen (Tylenol 325mg Tab)  650 mg PO Q6 PRN


   PRN Reason: Pain, moderate (4-7)


Acetaminophen (Tylenol 325mg Tab)  325 mg PO Q6 PRN


   PRN Reason: Pain, Mild (1-3)


Albuterol/Ipratropium (Duoneb 3 Mg/0.5 Mg (3 Ml) Ud)  3 ml INH RQ6 PRN


   PRN Reason: Shortness of Breath


Apixaban (Eliquis)  5 mg PO BID [facility]; Protocol


   Last Admin: 03/04/19 09:11 Dose:  5 mg


Atorvastatin Calcium (Lipitor)  40 mg PO DAILY [facility]


   Last Admin: 03/04/19 09:12 Dose:  40 mg


Bisoprolol Fumarate (Zebeta)  5 mg PO DAILY [facility]


   Last Admin: 03/04/19 09:11 Dose:  5 mg


Diltiazem HCl (Cardizem Cd)  120 mg PO DAILY [facility]


   Last Admin: 03/04/19 09:10 Dose:  120 mg


Furosemide (Lasix)  40 mg PO DAILY [facility]


   Last Admin: 03/04/19 09:11 Dose:  40 mg


Vancomycin HCl 500 mg/ Sodium (Chloride)  100 mls @ 100 mls/hr IVPB Q12 [facility]; 

Protocol


   Last Admin: 03/04/19 09:13 Dose:  100 mls/hr


Hydrocortisone Sodium Succinate 100 mg/ Sodium Chloride  100 mls @ 100 mls/hr IV

ONCE ONE


   Stop: 03/04/19 11:55


Isosorbide Mononitrate (Imdur Er)  30 mg PO DAILY [facility]


   Last Admin: 03/04/19 09:12 Dose:  30 mg


Lisinopril (Zestril)  20 mg PO DAILY [facility]


   Last Admin: 03/04/19 09:12 Dose:  20 mg


Oxycodone/Acetaminophen (Percocet 5/325 Mg Tab)  1 tab PO Q4 PRN


   PRN Reason: Pain, severe (8-10)


   Stop: 03/05/19 21:44











- Labs


Labs: 


                                        





                                 03/03/19 06:14 





                                 03/03/19 06:14 











- Constitutional


Appears: No Acute Distress





- Head Exam


Head Exam: ATRAUMATIC, NORMAL INSPECTION, NORMOCEPHALIC





- Eye Exam


Eye Exam: EOMI





- ENT Exam


ENT Exam: Mucous Membranes Moist





- Neck Exam


Neck Exam: Full ROM





- Respiratory Exam


Respiratory Exam: Prolonged Expiratory Phase





- Cardiovascular Exam


Cardiovascular Exam: REGULAR RHYTHM, +S1, +S2





- GI/Abdominal Exam


GI & Abdominal Exam: Soft





- Extremities Exam


Additional comments: 





LE with ace wrap in place( done by podiatry)





- Neurological Exam


Neurological Exam: Alert, Awake, Oriented x3





- Psychiatric Exam


Psychiatric exam: Normal Affect





- Skin


Skin Exam: Normal Color, Warm


Additional comments: 





eryhtematous patchy rash that extends to the thighs, trunk and internal aspect 

of upper extremities





Assessment and Plan





- Assessment and Plan (Free Text)


Assessment: 





86 y/o F with a PMHx of COPD, CAD, CHF, paroxysmal AFib and chronic b/l leg 

dermatitis and swelling admitted for evaluation and examination of intractable 

b/l leg pain and aggravating bilateral lower leg redness and swelling. 





Plan: 





#. Bilateral  Lower extremity Stasis dermatitis with leg cellulitis


-chronic stasis dermatitis , aggravated by edema  improving, cellulitis 

diagnosis less likely at this time.


-Dopple venous US negative for DVT


-Pt was started on IV Vanco in ED, will discontinue Vanco due to patchy 

erythematous patchy rash likely due to Vanco use, also cellulitis less likely at

this time.


- Podiatry consulted, will f/u recomms  


- keep leg elevated


- Podiatry wrapped ACE bandage on both LE to decrease edema


-Vanco trough 10.1 today





#New onset Erythemaotous patchy rash


-Likely secondary to use of new medications, will discontinue Vanco


-Hidrocortison 100 mg IV x 1 given


-Benadryl 50 IV stat given


-Will f/u resolution





#. Chronic COPD


--On Oxygen by [facility] at home , cont 2 LPM


--Pulmonology consult, Dr Pyle, recomms appreciated 


- cont Duoneb 





#. Acute on chronic CHF exacerbation diastolic dysfunction- preserved EF


--Last echocardiogram on 02/04/19: LVEF 50-55%.


--Furosemide 40mg IV STAT was given 


- cont PO Lasix, Lisinopril, Imdur and Bisoprolol





#. Paroxysmal Afib 


--Rate controlled


--Continue with Cardizem, Bisoprolol and Eliquis





#DVT prophylaxis


--On Eliquis BID





Diet


--Heart healthy diet

## 2019-03-04 NOTE — CP.PCM.PN
Subjective





- Date & Time of Evaluation


Date of Evaluation: 03/04/19


Time of Evaluation: 10:10





- Subjective


Subjective: 





Erythematous papular/pruritic rash over both LEs and trunk.


Has remained afebrile since admission.


No leukocytosis, but ESR is 120MM/HR.


Only new medications  or changes from home are ;


Lipitor for Crestor, Vancomycin and LacHydrin.





Diffuse erythematous rash over both legs as well as her trunk.


Dependant edema of both feet and ankles. No cyanosis of toes.


Neck is supple and trachea midline.


Breath sounds are diminished bilaterally with few dry rales in lower lobes.


No audible wheezes or bronchial breath sounds.





Severe stasis dermatitis vs cellulitis.


Apparent drug rash (vanco, lac-hydrin or atorvastatin).


Recent pneumonia and paroxysmal a fib now in NSR.


CAD with prior MI-PCI/stent, Hypertension.





(Although she clinically appears as though she may have pulmonary hypertension 

with RV overload, a right sided cardiac cath done about 18 months ago showed  

relatively normal PA pressures.)





I will give her one dose of hydrocortisone IV.


She may benefit from psych eval as well (she has become manipulative of her 

family and does not follow instructions).





Objective





- Vital Signs/Intake and Output


Vital Signs (last 24 hours): 


                                        











Temp Pulse Resp BP Pulse Ox


 


 97.8 F   67   20   134/72   92 L


 


 03/04/19 08:28  03/04/19 09:12  03/04/19 08:28  03/04/19 09:12  03/04/19 08:28











- Medications


Medications: 


                               Current Medications





Acetaminophen (Tylenol 325mg Tab)  650 mg PO Q6 PRN


   PRN Reason: Pain, moderate (4-7)


Acetaminophen (Tylenol 325mg Tab)  325 mg PO Q6 PRN


   PRN Reason: Pain, Mild (1-3)


Albuterol/Ipratropium (Duoneb 3 Mg/0.5 Mg (3 Ml) Ud)  3 ml INH RQ6 PRN


   PRN Reason: Shortness of Breath


Apixaban (Eliquis)  5 mg PO BID [facility]; Protocol


   Last Admin: 03/04/19 09:11 Dose:  5 mg


Atorvastatin Calcium (Lipitor)  40 mg PO DAILY [facility]


   Last Admin: 03/04/19 09:12 Dose:  40 mg


Bisoprolol Fumarate (Zebeta)  5 mg PO DAILY [facility]


   Last Admin: 03/04/19 09:11 Dose:  5 mg


Diltiazem HCl (Cardizem Cd)  120 mg PO DAILY [facility]


   Last Admin: 03/04/19 09:10 Dose:  120 mg


Furosemide (Lasix)  40 mg PO DAILY [facility]


   Last Admin: 03/04/19 09:11 Dose:  40 mg


Vancomycin HCl 500 mg/ Sodium (Chloride)  100 mls @ 100 mls/hr IVPB Q12 [facility]; 

Protocol


   Last Admin: 03/04/19 09:13 Dose:  100 mls/hr


Isosorbide Mononitrate (Imdur Er)  30 mg PO DAILY [facility]


   Last Admin: 03/04/19 09:12 Dose:  30 mg


Lisinopril (Zestril)  20 mg PO DAILY [facility]


   Last Admin: 03/04/19 09:12 Dose:  20 mg


Oxycodone/Acetaminophen (Percocet 5/325 Mg Tab)  1 tab PO Q4 PRN


   PRN Reason: Pain, severe (8-10)


   Stop: 03/05/19 21:44











- Labs


Labs: 


                                        





                                 03/03/19 06:14 





                                 03/03/19 06:14

## 2019-03-05 RX ADMIN — DILTIAZEM HYDROCHLORIDE SCH MG: 120 CAPSULE, COATED, EXTENDED RELEASE ORAL at 09:18

## 2019-03-05 RX ADMIN — BETAMETHASONE DIPROPIONATE SCH APPLIC: 0.5 CREAM TOPICAL at 16:24

## 2019-03-05 NOTE — CP.PCM.PN
Subjective





- Date & Time of Evaluation


Date of Evaluation: 03/05/19


Time of Evaluation: 09:20





- Subjective


Subjective: 





Patient seen and examined today at bedside, in NAD. Patient verbalizes she wants

to go home. Pt states feeling well, denies SOB, CP, N/V/D. She remains afebrile.

Patient states she is getting OOB with PT and walking.


Patient only c/o is her rash, which she states is not itching, patient notes 

some improvement of the rash in the areas she can see upper arms and frontal 

aspect of the trunk.








Objective





- Vital Signs/Intake and Output


Vital Signs (last 24 hours): 


                                        











Temp Pulse Resp BP Pulse Ox


 


 97.5 F L  64   20   146/68   93 L


 


 03/05/19 08:29  03/05/19 09:18  03/05/19 08:29  03/05/19 09:18  03/05/19 08:29











- Medications


Medications: 


                               Current Medications





Acetaminophen (Tylenol 325mg Tab)  650 mg PO Q6 PRN


   PRN Reason: Pain, moderate (4-7)


Acetaminophen (Tylenol 325mg Tab)  325 mg PO Q6 PRN


   PRN Reason: Pain, Mild (1-3)


Albuterol/Ipratropium (Duoneb 3 Mg/0.5 Mg (3 Ml) Ud)  3 ml INH RQ6 PRN


   PRN Reason: Shortness of Breath


Apixaban (Eliquis)  5 mg PO BID [facility]; Protocol


   Last Admin: 03/05/19 09:18 Dose:  5 mg


Betamethasone Dipropion Augmented (Diprolene Af)  0 gm TOP BID [facility]


Bisoprolol Fumarate (Zebeta)  5 mg PO DAILY [facility]


   Last Admin: 03/05/19 09:15 Dose:  5 mg


Diltiazem HCl (Cardizem Cd)  120 mg PO DAILY [facility]


   Last Admin: 03/05/19 09:18 Dose:  120 mg


Furosemide (Lasix)  40 mg PO DAILY [facility]


   Last Admin: 03/05/19 09:16 Dose:  40 mg


Isosorbide Mononitrate (Imdur Er)  30 mg PO DAILY [facility]


   Last Admin: 03/05/19 09:15 Dose:  30 mg


Lisinopril (Zestril)  20 mg PO DAILY [facility]


   Last Admin: 03/05/19 09:16 Dose:  20 mg











- Labs


Labs: 


                                        





                                 03/04/19 11:20 





                                 03/03/19 06:14 











- Additional Findings


Additional findings: 





- Constitutional


Appears: No Acute Distress





- Head Exam


Head Exam: ATRAUMATIC, NORMAL INSPECTION, NORMOCEPHALIC





- Eye Exam


Eye Exam: EOMI





- ENT Exam


ENT Exam: Mucous Membranes Moist





- Neck Exam


Neck Exam: Full ROM





- Respiratory Exam


Respiratory Exam: Prolonged Expiratory Phase





- Cardiovascular Exam


Cardiovascular Exam: REGULAR RHYTHM, +S1, +S2





- GI/Abdominal Exam


GI & Abdominal Exam: Soft





- Extremities Exam


Additional comments: 





LE with ace wrap in place( done by podiatry)





- Neurological Exam


Neurological Exam: Alert, Awake, Oriented x3





- Psychiatric Exam


Psychiatric exam: Normal Affect





- Skin


Skin Exam: Normal Color, Warm


Additional comments: 





eryhtematous patchy rash that extends to the thighs, trunk, back and internal 

aspect of upper extremities. 


Today the rash is comparatively better in the area of the chest and upper 

extremities, the rash in the back area is unchanged. 








Assessment and Plan





- Assessment and Plan (Free Text)


Assessment: 





86 y/o F with a PMHx of COPD, CAD, CHF, paroxysmal AFib and chronic b/l leg 

dermatitis and swelling admitted for evaluation and examination of intractable 

b/l leg pain and aggravating bilateral lower leg redness and swelling. 





Plan: 





#. Bilateral  Lower extremity Stasis dermatitis with leg cellulitis


-chronic stasis dermatitis , aggravated by edema  improving, cellulitis 

diagnosis less likely at this time.


-Dopple venous US negative for DVT


-Pt was started on IV Vanco in ED, will discontinue Vanco due to patchy eryt

hematous patchy rash likely due to Vanco use, also cellulitis less likely at 

this time.


- Podiatry consulted, will f/u recomms  


- keep leg elevated


- Podiatry wrapped ACE bandage on both LE to decrease edema


-Vanco trough last 10.1 





#New onset Erythemaotous patchy rash


-Likely secondary to use of new medications, Vanco stopped. mild improvement 

noted today


-Hidrocortison 100 mg IV x 1 given


-Benadryl 50 IV given


-Start Betamethasone Dipropionate top BID


-Will f/u resolution





#. Chronic COPD


--On Oxygen by [facility] at home , cont 2 LPM


--Pulmonology consult, Dr Pyle, recomms appreciated 


- cont Duoneb 





#. Acute on chronic CHF exacerbation diastolic dysfunction- preserved EF


--Last echocardiogram on 02/04/19: LVEF 50-55%.


--Furosemide 40mg IV STAT was given 


- cont PO Lasix, Lisinopril, Imdur and Bisoprolol





#. Paroxysmal Afib 


--Rate controlled


--Continue with Cardizem, Bisoprolol and Eliquis





#DVT prophylaxis


--On Eliquis BID





Diet


--Heart healthy diet

## 2019-03-05 NOTE — CP.PCM.PN
Subjective





- Date & Time of Evaluation


Date of Evaluation: 03/05/19


Time of Evaluation: 13:32





- Subjective


Subjective: 





Seen on morning rounds.


From a respiratory standpoint patient appears to be doing relatively well.


She offers a complaint of occasional cough which is nonproductive.


She denies any shortness of breath.


She is ambulatory within her room.





She does have extensive erythematous rash over the extremities as well as the 

trunk.


Breath sounds are diminished bilaterally but there is no audible wheezing or 

bronchial breath sounds.


Dry rales are heard in the lower lobes posteriorly.





Apparent drug reaction, etiological agent somewhat elusive.


We will repeat dosage of hydrocortisone parenterally 100 mg.


Continue remainder of maintenance medications unchanged.


Followup CBC with differential and sedimentation rate.





Objective





- Vital Signs/Intake and Output


Vital Signs (last 24 hours): 


                                        











Temp Pulse Resp BP Pulse Ox


 


 97.5 F L  64   20   146/68   93 L


 


 03/05/19 08:29  03/05/19 09:18  03/05/19 08:29  03/05/19 09:18  03/05/19 08:29











- Medications


Medications: 


                               Current Medications





Acetaminophen (Tylenol 325mg Tab)  650 mg PO Q6 PRN


   PRN Reason: Pain, moderate (4-7)


Acetaminophen (Tylenol 325mg Tab)  325 mg PO Q6 PRN


   PRN Reason: Pain, Mild (1-3)


Albuterol/Ipratropium (Duoneb 3 Mg/0.5 Mg (3 Ml) Ud)  3 ml INH RQ6 PRN


   PRN Reason: Shortness of Breath


Apixaban (Eliquis)  5 mg PO BID [facility]; Protocol


   Last Admin: 03/05/19 09:18 Dose:  5 mg


Betamethasone Dipropion Augmented (Diprolene Af)  0 gm TOP BID [facility]


Bisoprolol Fumarate (Zebeta)  5 mg PO DAILY [facility]


   Last Admin: 03/05/19 09:15 Dose:  5 mg


Diltiazem HCl (Cardizem Cd)  120 mg PO DAILY [facility]


   Last Admin: 03/05/19 09:18 Dose:  120 mg


Furosemide (Lasix)  40 mg PO DAILY [facility]


   Last Admin: 03/05/19 09:16 Dose:  40 mg


Hydrocortisone Sodium Succinate 100 mg/ Sodium Chloride  100 mls @ 100 mls/hr IV

ONCE ONE


   Stop: 03/05/19 14:29


Isosorbide Mononitrate (Imdur Er)  30 mg PO DAILY [facility]


   Last Admin: 03/05/19 09:15 Dose:  30 mg


Lisinopril (Zestril)  20 mg PO DAILY [facility]


   Last Admin: 03/05/19 09:16 Dose:  20 mg











- Labs


Labs: 


                                        





                                 03/04/19 11:20 





                                 03/03/19 06:14

## 2019-03-05 NOTE — CP.PCM.PN
Subjective





- Date & Time of Evaluation


Date of Evaluation: 03/05/19


Time of Evaluation: 11:32





- Subjective


Subjective: 





Podiatry Consult Note: Dr. Barrera





87 year old female patient seen and evauated for b/l lower extremity chronic 

stasis edema, dermatitis with cellulitis. Patient states that she has some pain 

in her legs. Patient states that she developed yesterday a reaction from 

medication and she has rash in her arms for which she received Benadryl. She 

denies any overnight nausea/vomiting/fever/chest pain/chills, or shortness of 

breath. 











Objective





- Vital Signs/Intake and Output


Vital Signs (last 24 hours): 


                                        











Temp Pulse Resp BP Pulse Ox


 


 97.5 F L  64   20   146/68   93 L


 


 03/05/19 08:29  03/05/19 09:18  03/05/19 08:29  03/05/19 09:18  03/05/19 08:29











- Medications


Medications: 


                               Current Medications





Acetaminophen (Tylenol 325mg Tab)  650 mg PO Q6 PRN


   PRN Reason: Pain, moderate (4-7)


Acetaminophen (Tylenol 325mg Tab)  325 mg PO Q6 PRN


   PRN Reason: Pain, Mild (1-3)


Albuterol/Ipratropium (Duoneb 3 Mg/0.5 Mg (3 Ml) Ud)  3 ml INH RQ6 PRN


   PRN Reason: Shortness of Breath


Apixaban (Eliquis)  5 mg PO BID [facility]; Protocol


   Last Admin: 03/05/19 09:18 Dose:  5 mg


Betamethasone Dipropion Augmented (Diprolene Af)  0 gm TOP BID [facility]


Bisoprolol Fumarate (Zebeta)  5 mg PO DAILY [facility]


   Last Admin: 03/05/19 09:15 Dose:  5 mg


Diltiazem HCl (Cardizem Cd)  120 mg PO DAILY [facility]


   Last Admin: 03/05/19 09:18 Dose:  120 mg


Furosemide (Lasix)  40 mg PO DAILY [facility]


   Last Admin: 03/05/19 09:16 Dose:  40 mg


Isosorbide Mononitrate (Imdur Er)  30 mg PO DAILY [facility]


   Last Admin: 03/05/19 09:15 Dose:  30 mg


Lisinopril (Zestril)  20 mg PO DAILY [facility]


   Last Admin: 03/05/19 09:16 Dose:  20 mg











- Labs


Labs: 


                                        





                                 03/04/19 11:20 





                                 03/03/19 06:14 











- Constitutional


Appears: Non-toxic, No Acute Distress





- Head Exam


Head Exam: ATRAUMATIC, NORMOCEPHALIC





- Extremities Exam


Additional comments: 





B/L lower extremities focused exam:





Vascular: DP/PT 1/4, Cap refill < 3 seconds, Temp gradient warm to warm, Mild 

pitting edema appreciated to bilateral lower extremities, Edema is improving.





Neuro: Gross and protective sensation intact.





Derm: B/L erythema to b/l lower extremities circumfrentially beginning at tibial

tuberosity extending down to digits, which decrease in erythema when elevated. 

No open lesions at this time, no weeping or drainage appreciated. No purulence, 

nor streaking appreciated. Elongated, dystrophic nails.





MSK: Pain with palpation of posterior aspect of b/l lower extremities, MMT 4/5 

in all groups b/l.


 




















- Neurological Exam


Neurological Exam: Alert, Awake





Assessment and Plan





- Assessment and Plan (Free Text)


Assessment: 





Patient states that she developed yesterday a reaction from medication and she 

has rash in her arms.


Plan: 





Patient seen and evaluated 


Discussed patient plan in detail with Dr. Barrera


Charts, labs and vitals reviewed: Afebrile, absent leukocytosis , ESR > 120 


Ammonium lactate 12% lotion and Ace bandage applied to lower extremities  no 

open wounds or weeping at this time 


Patient to continue elevating lower extremities.


LE US; negative for DVT 


Blood cultures taken; No growth after 48 hours


Continue IV abx as per primary team.


C/W pain management as needed. 


Continue wearing surgical shoes when ambulating.


Podiatry will continue to follow up the patient upon discharge.

## 2019-03-06 LAB
ANISOCYTOSIS BLD QL SMEAR: SLIGHT
BASOPHILS # BLD AUTO: 0 K/UL (ref 0–0.2)
BASOPHILS NFR BLD: 0.2 % (ref 0–2)
EOSINOPHIL # BLD AUTO: 0.6 K/UL (ref 0–0.7)
EOSINOPHIL NFR BLD: 3 % (ref 0–7)
EOSINOPHIL NFR BLD: 3.4 % (ref 0–4)
ERYTHROCYTE [DISTWIDTH] IN BLOOD BY AUTOMATED COUNT: 15.7 % (ref 11.5–14.5)
ERYTHROCYTE [SEDIMENTATION RATE] IN BLOOD: 105 MM/HR (ref 0–30)
HGB BLD-MCNC: 11.5 G/DL (ref 12–16)
HYPOCHROMIC: SLIGHT
LYMPHOCYTE: 9 % (ref 20–50)
LYMPHOCYTES # BLD AUTO: 0.9 K/UL (ref 1–4.3)
LYMPHOCYTES NFR BLD AUTO: 5.4 % (ref 20–40)
MCH RBC QN AUTO: 28 PG (ref 27–31)
MCHC RBC AUTO-ENTMCNC: 32.6 G/DL (ref 33–37)
MCV RBC AUTO: 85.8 FL (ref 81–99)
MONOCYTE: 4 % (ref 0–10)
MONOCYTES # BLD: 0.5 K/UL (ref 0–0.8)
MONOCYTES NFR BLD: 2.9 % (ref 0–10)
NEUTROPHILS # BLD: 14.9 K/UL (ref 1.8–7)
NEUTROPHILS NFR BLD AUTO: 84 % (ref 42–75)
NEUTROPHILS NFR BLD AUTO: 88.1 % (ref 50–75)
NRBC BLD AUTO-RTO: 0 % (ref 0–0)
PLATELET # BLD EST: (no result) 10*3/UL
PLATELET # BLD: 406 K/UL (ref 130–400)
PMV BLD AUTO: 7.5 FL (ref 7.2–11.7)
RBC # BLD AUTO: 4.11 MIL/UL (ref 3.8–5.2)
TOTAL CELLS COUNTED BLD: 100
WBC # BLD AUTO: 16.9 K/UL (ref 4.8–10.8)

## 2019-03-06 RX ADMIN — BETAMETHASONE DIPROPIONATE SCH APPLIC: 0.5 CREAM TOPICAL at 17:38

## 2019-03-06 RX ADMIN — DILTIAZEM HYDROCHLORIDE SCH MG: 120 CAPSULE, COATED, EXTENDED RELEASE ORAL at 09:08

## 2019-03-06 RX ADMIN — BETAMETHASONE DIPROPIONATE SCH APPLIC: 0.5 CREAM TOPICAL at 09:06

## 2019-03-06 NOTE — CP.PCM.DIS
Provider





- Provider


Date of Admission: 


03/02/19 18:50





Attending physician: 


Daniel Greer MD





Consults: 








03/02/19 20:49


Physician Consult Stat 


   Comment: 


   Consulting Provider: Lewis Sandoval


   Consulting Physician: Lewis Sandoval


   Reason for Consult: COPD





03/02/19 20:56


Podiatry Consult Stat 


   Comment: 


   Consulting Provider: Tamir Barrera


   Consulting Physician: Tamir Barrera


   Reason for Consult: dermatitis





03/04/19 16:27


Psychology Consult Routine 


   Comment: 


   Consulting Provider: Catie Pearson


   Consulting Physician: Catie Pearson


   Reason for Consult: please evaluate











Time Spent in preparation of Discharge (in minutes): 33





Diagnosis





- Discharge Diagnosis


(1) Rash and nonspecific skin eruption


Status: Acute   





(2) Leg pain, bilateral


Status: Chronic   





(3) Stasis dermatitis


Status: Chronic   





(4) Physical deconditioning


Status: Chronic   Priority: High   





(5) CAD (coronary artery disease)


Status: Chronic   Priority: High   





(6) CHF (congestive heart failure)


Status: Chronic   Priority: High   





(7) COPD (chronic obstructive pulmonary disease)


Status: Chronic   Priority: High   





(8) Exogenous obesity


Status: Chronic   Priority: High   





(9) Hypertension


Status: Chronic   Priority: High   





Hospital Course





- Lab Results


Lab Results: 


                                  Micro Results





03/02/19 18:07   Blood   Blood Culture - Preliminary


                            NO GROWTH AFTER 3 DAYS


03/02/19 18:07   Blood   Blood Culture - Preliminary


                            NO GROWTH AFTER 3 DAYS





                             Most Recent Lab Values











WBC  16.9 K/uL (4.8-10.8)  H D 03/06/19  07:00    


 


RBC  4.11 Mil/uL (3.80-5.20)   03/06/19  07:00    


 


Hgb  11.5 g/dL (12.0-16.0)  L  03/06/19  07:00    


 


Hct  35.2 % (34.0-47.0)   03/06/19  07:00    


 


MCV  85.8 fl (81.0-99.0)   03/06/19  07:00    


 


MCH  28.0 pg (27.0-31.0)   03/06/19  07:00    


 


MCHC  32.6 g/dL (33.0-37.0)  L  03/06/19  07:00    


 


RDW  15.7 % (11.5-14.5)  H  03/06/19  07:00    


 


Plt Count  406 K/uL (130-400)  H  03/06/19  07:00    


 


MPV  7.5 fl (7.2-11.7)   03/06/19  07:00    


 


Neut % (Auto)  88.1 % (50.0-75.0)  H  03/06/19  07:00    


 


Lymph % (Auto)  5.4 % (20.0-40.0)  L  03/06/19  07:00    


 


Mono % (Auto)  2.9 % (0.0-10.0)   03/06/19  07:00    


 


Eos % (Auto)  3.4 % (0.0-4.0)   03/06/19  07:00    


 


Baso % (Auto)  0.2 % (0.0-2.0)   03/06/19  07:00    


 


Neut # (Auto)  14.9 K/uL (1.8-7.0)  H  03/06/19  07:00    


 


Lymph # (Auto)  0.9 K/uL (1.0-4.3)  L  03/06/19  07:00    


 


Mono # (Auto)  0.5 K/uL (0.0-0.8)   03/06/19  07:00    


 


Eos # (Auto)  0.6 K/uL (0.0-0.7)   03/06/19  07:00    


 


Baso # (Auto)  0.0 K/uL (0.0-0.2)   03/06/19  07:00    


 


Neutrophils % (Manual)  84 % (42-75)  H  03/06/19  07:00    


 


Lymphocytes % (Manual)  9 % (20-50)  L  03/06/19  07:00    


 


Monocytes % (Manual)  4 % (0-10)   03/06/19  07:00    


 


Eosinophils % (Manual)  3 % (0-7)   03/06/19  07:00    


 


Basophils % (Manual)  1 % (0-2)   03/02/19  17:50    


 


Platelet Estimate  Slightly increased  (NORMAL)  H  03/06/19  07:00    


 


Hypochromasia (manual)  Slight   03/06/19  07:00    


 


Anisocytosis (manual)  Slight   03/06/19  07:00    


 


ESR  105 mm/hr (0-30)  H  03/06/19  07:00    


 


Sodium  138 mmol/l (132-148)   03/03/19  06:14    


 


Potassium  3.7 MMOL/L (3.6-5.0)   03/03/19  06:14    


 


Chloride  95 mmol/L ()  L  03/03/19  06:14    


 


Carbon Dioxide  34 mmol/L (22-30)  H  03/03/19  06:14    


 


Anion Gap  13  (10-20)   03/03/19  06:14    


 


BUN  16 mg/dl (7-17)   03/03/19  06:14    


 


Creatinine  1.2 mg/dl (0.7-1.2)   03/03/19  06:14    


 


Est GFR ( Amer)  51   03/03/19  06:14    


 


Est GFR (Non-Af Amer)  42   03/03/19  06:14    


 


Random Glucose  116 mg/dL ()  H  03/03/19  06:14    


 


Calcium  8.6 mg/dL (8.4-10.2)   03/03/19  06:14    


 


NT-Pro-B Natriuret Pep  253 pg/ml (0-900)   03/02/19  17:50    


 


Vancomycin Trough  10.1 ug/mL (5.0-10.0)  H  03/04/19  10:00    














- Hospital Course


Hospital Course: 





86 Y/O female with a PMH of COPD, CAD, CHF, AFib and chronic b/l chronic 

baseline leg dermatitis and swelling, who was admitted on 3/2/19 due to 

aggravating bilateral lower leg redness and swelling with diagnosis of bilateral

lower extremity stasis dermatitis with leg cellulitis. During her hospital 

course patient received treatment with IV antibiotics Vancomycin started on 

admission showing improvement of her b/l LE cellulitis on the second day after 

admission. Patient had b/l LE Doppler US done negative for DVT, WBC within range

on admission, blood cultures showed no growth after 48 hours. Patient was 

evaluated during her course by Podiatry and treated with Acewrap banding for her

chronic LE swelling and stasis dermatitis. As per podiatry patient can follow up

as outpatient.


On day 3 after admission patient was noted developing an erythematous rash on 

trunk and extremities of inconclusive cause. VAnco was discontinued.


Patient has remained afebrile during hospital course, today patient rash is 

unchanged, otherwise she denies SOB, CP, HA, or fever. Patient c/o some itching 

and nausea and benadryl IV, Zofran 4 IV, Hydrocosrtizone given. Patient today is

found hemodinamically stable, with no other acute medical complaints. Decision 

is taken to DC and transfer to TCU for patient will benefit with further PT to 

improve gait and ADL at this time.


Upon DC to TCU patient will continue with her home meds as per med 

reconciliation.








Discharge Exam





- Head Exam


Head Exam: ATRAUMATIC, NORMOCEPHALIC





- Additional Findings


Additional findings: 





- Additional Findings


Additional findings: 





- Additional Findings


Additional findings: 





- Constitutional


Appears: No Acute Distress





- Head Exam


Head Exam: ATRAUMATIC, NORMAL INSPECTION, NORMOCEPHALIC





- Eye Exam


Eye Exam: EOMI





- ENT Exam


ENT Exam: Mucous Membranes Moist





- Neck Exam


Neck Exam: Full ROM





- Respiratory Exam


Respiratory Exam: Prolonged Expiratory Phase





- Cardiovascular Exam


Cardiovascular Exam: REGULAR RHYTHM, +S1, +S2





- GI/Abdominal Exam


GI & Abdominal Exam: Obese, Soft, no tenderness





- Extremities Exam


Additional comments: 





LE with ace wrap in place( done by podiatry)





- Neurological Exam


Neurological Exam: Alert, Awake, Oriented x3





- Psychiatric Exam


Psychiatric exam: Normal Affect





- Skin


Skin Exam: Normal Color, Warm


Additional comments: 





eryhtematous patchy rash that extends to the thighs, trunk, back and internal 

aspect of upper extremities. 


 rash is unchanged today. 








Discharge Plan





- Follow Up Plan


Condition: FAIR


Disposition: REHAB FACILITY/REHAB UNIT


Instructions:  Dependent Edema (DC)


Referrals: 


Tamir Barrera MD [Staff Provider] - 


Molina Pyle MD [Family Provider] - 


Lewis Sandoval MD [Staff Provider] - 





Clinical Quality Measures





- CQM - Stroke


Anticoagulation Prescribed for Atrial Flutter, Atrial Fibrillation and History 

of:: Yes


Statin prescribed: Yes





- CQM - VTE


Did patient receive overlap therapy during hosptialization?: Yes


Is patient being discharged on overlap therapy?: Yes





- CQM - Heart Failure


Beta-Blocker Prescribed: Bisoprolol


AnticoagulationTherapy for Atrial Fibrillation/Atrialflutter: Yes


Will be discharged to: Skilled Nursing Facility (TCU)

## 2019-03-06 NOTE — CP.PCM.PN
Subjective





- Date & Time of Evaluation


Date of Evaluation: 03/06/19


Time of Evaluation: 11:00





- Subjective


Subjective: 


Patient was seen and evaluated bedside . All chart and clinical data reviewed . 

Case discussed with resident . 


Complaining of vertigo, nausea and with bilious vomiting today 


Continuos to have persistent diffuse erythematous, blanching ,pruritic rash all 

over her body with no stridor or wheezing.


Hemodynamically stable, afebrile





 





Objective





- Vital Signs/Intake and Output


Vital Signs (last 24 hours): 


                                        











Temp Pulse Resp BP Pulse Ox


 


 97.7 F   57 L  18   144/74   94 L


 


 03/06/19 16:40  03/06/19 16:40  03/06/19 16:40  03/06/19 09:09  03/06/19 16:40











- Medications


Medications: 


                               Current Medications





Acetaminophen (Tylenol 325mg Tab)  650 mg PO Q6 PRN


   PRN Reason: Pain, moderate (4-7)


Acetaminophen (Tylenol 325mg Tab)  325 mg PO Q6 PRN


   PRN Reason: Pain, Mild (1-3)


Albuterol/Ipratropium (Duoneb 3 Mg/0.5 Mg (3 Ml) Ud)  3 ml INH RQ6 PRN


   PRN Reason: Shortness of Breath


Apixaban (Eliquis)  5 mg PO BID [facility]; Protocol


   Last Admin: 03/06/19 17:38 Dose:  5 mg


Betamethasone Dipropion Augmented (Diprolene Af)  0 gm TOP BID [facility]


   Last Admin: 03/06/19 17:38 Dose:  1 applic


Bisoprolol Fumarate (Zebeta)  5 mg PO DAILY [facility]


   Last Admin: 03/06/19 09:09 Dose:  5 mg


Diltiazem HCl (Cardizem Cd)  120 mg PO DAILY [facility]


   Last Admin: 03/06/19 09:08 Dose:  120 mg


Furosemide (Lasix)  40 mg PO DAILY [facility]


   Last Admin: 03/06/19 09:08 Dose:  40 mg


Isosorbide Mononitrate (Imdur Er)  30 mg PO DAILY [facility]


   Last Admin: 03/06/19 09:08 Dose:  30 mg


Lisinopril (Zestril)  20 mg PO DAILY [facility]


   Last Admin: 03/06/19 09:09 Dose:  20 mg











- Labs


Labs: 


                                        





                                 03/06/19 07:00 





                                 03/03/19 06:14 











- Constitutional


Appears: Non-toxic, Other (obese )





- Head Exam


Head Exam: ATRAUMATIC, NORMOCEPHALIC





- Eye Exam


Eye Exam: EOMI, PERRL


Pupil Exam: NORMAL ACCOMODATION





- ENT Exam


ENT Exam: Mucous Membranes Moist, Normal Exam





- Neck Exam


Neck Exam: Normal Inspection





- Respiratory Exam


Respiratory Exam: Decreased Breath Sounds (bibasilar ), Clear to Ausculation 

Bilateral.  absent: Wheezes, Respiratory Distress, Stridor





- Cardiovascular Exam


Cardiovascular Exam: REGULAR RHYTHM, RRR, +S1, +S2.  absent: JVD





- GI/Abdominal Exam


GI & Abdominal Exam: Soft, Normal Bowel Sounds.  absent: Distended, Guarding, 

Rebound





- Rectal Exam


Rectal Exam: Deferred





- Extremities Exam


Additional comments: 





chronic bilateral LE stasis dermatitis extending from ankles to the knees 

bilaterally 





- Back Exam


Back Exam: NORMAL INSPECTION





- Neurological Exam


Neurological Exam: Alert, Awake, CN II-XII Intact, Oriented x3





- Psychiatric Exam


Psychiatric exam: Normal Affect





- Skin


Skin Exam: Dry, Warm


Additional comments: 





diffuse eczematous blanching rash pruritic involving all her body , Trunk , 

upper and LE 





Assessment and Plan





- Assessment and Plan (Free Text)


Assessment: 


Patient was seen and evaluated bedside . All chart and clinical data 

reviewed.Case discussed with resident . Agree with assessment and plan.


86 y/o female with PMH Obesity BMI 47, chronic bilateral LE stasis dermatitis, 

COPD on home O2, paroxysmal Afib presented for worsening redness , swelling and 

pain to her LE. Patient was discharged home 2 weeks ago after being treated for 

Afib with RVR and CAP. She did receive physical therapy for gait training and 

was discharged home in stable conditions with home services, visiting nurse and 

PT.


She was placed under observation for LE stasis dermatitis and possible 

cellulitis and started on Vancomycin IV .She developed a  Diffuse erythematous 

pruritic rash all over her body that now has coalesced together. Vancomycin and 

lac hydrin were new medications that were strated recently and were discontinued




She was given Hydrocortizone IV and Benadryl with minimal improvement of rash . 


Developed vertigo with nausea and vomiting and was treated with Meclizine and 

Zofran IV with improvement of her symptoms


PT consulted and planned to discharge patient to TCU for PT


Dermatology was consulted for the rash 








Dx 


1.Diffuse Pruritic erythematous rash-- most likely allergic reaction , drug 

induced .Vanco and lyc hydrin held. Dermatology consulted.Will give another dose

of    Hydrocortizone IV  and Benadryl today 


2.Chronic bilateral LE stasis dermatitis-- podiatry consulted .ACE wrapping . 

F/u with podiatry while in house 


3. Vertigo - Meclizine PRN given  and PT consulted  .Will order imaging if 

symptoms persists . Plan to d/c to TCU for PT 


4. Chronic COPD on home O2- stable. Pulmonary on consult 


5. Obesity BMI 47


6. Paroxysmal Afib- at present in SR. Continue cardizem PO and Eliquist 


7.HTN - controlled on home meds , Lisinopril, Bisoprolol

## 2019-03-06 NOTE — CP.PCM.PN
Subjective





- Date & Time of Evaluation


Date of Evaluation: 03/06/19


Time of Evaluation: 15:18





- Subjective


Subjective: 





Seen on rounds in the afternoon.


Presently sedated after receiving parenteral steroids and Benadryl.


Awakens easily, but drowsy. Follows simple commands.


Vital signs appear to be stable. She remains afebrile.


WBC up to 16.9, Hgb and platelets are stable.


Diffuse erythematous eruption has become more confluent and pruritic.


Respiratory status seems stable w/o complaint of SOB or cough.


Breath sounds are diminished w/o audible wheezes or bronchial breath sounds.





Leukocytosis may well be secondary to repeated doses of hydrocortisone.


Dermatology consultation would be helpful.


Respiratory drug regimen will remain the same.





Objective





- Vital Signs/Intake and Output


Vital Signs (last 24 hours): 


                                        











Temp Pulse Resp BP Pulse Ox


 


 98.9 F   87   20   144/74   93 L


 


 03/06/19 08:39  03/06/19 09:09  03/06/19 08:39  03/06/19 09:09  03/06/19 08:39











- Medications


Medications: 


                               Current Medications





Acetaminophen (Tylenol 325mg Tab)  650 mg PO Q6 PRN


   PRN Reason: Pain, moderate (4-7)


Acetaminophen (Tylenol 325mg Tab)  325 mg PO Q6 PRN


   PRN Reason: Pain, Mild (1-3)


Albuterol/Ipratropium (Duoneb 3 Mg/0.5 Mg (3 Ml) Ud)  3 ml INH RQ6 PRN


   PRN Reason: Shortness of Breath


Apixaban (Eliquis)  5 mg PO BID [facility]; Protocol


   Last Admin: 03/06/19 09:08 Dose:  5 mg


Betamethasone Dipropion Augmented (Diprolene Af)  0 gm TOP BID [facility]


   Last Admin: 03/06/19 09:06 Dose:  1 applic


Bisoprolol Fumarate (Zebeta)  5 mg PO DAILY [facility]


   Last Admin: 03/06/19 09:09 Dose:  5 mg


Diltiazem HCl (Cardizem Cd)  120 mg PO DAILY [facility]


   Last Admin: 03/06/19 09:08 Dose:  120 mg


Furosemide (Lasix)  40 mg PO DAILY [facility]


   Last Admin: 03/06/19 09:08 Dose:  40 mg


Isosorbide Mononitrate (Imdur Er)  30 mg PO DAILY [facility]


   Last Admin: 03/06/19 09:08 Dose:  30 mg


Lisinopril (Zestril)  20 mg PO DAILY [facility]


   Last Admin: 03/06/19 09:09 Dose:  20 mg











- Labs


Labs: 


                                        





                                 03/06/19 07:00 





                                 03/03/19 06:14

## 2019-03-06 NOTE — CP.PCM.PN
Subjective





- Date & Time of Evaluation


Date of Evaluation: 03/06/19


Time of Evaluation: 09:05





- Subjective


Subjective: 





Patient seen and examined today at bedside, in NAD. Patient today c/o some 

itchiness of the rash that she did not have yesterday, and she is feeling 

uncomfortable today due to this rash which is extensive in the trunk and thighs.

Patient denies CP, SOB, N/V/D. she remains afebrile.























Objective





- Vital Signs/Intake and Output


Vital Signs (last 24 hours): 


                                        











Temp Pulse Resp BP Pulse Ox


 


 98.9 F   87   20   144/74   93 L


 


 03/06/19 08:39  03/06/19 08:39  03/06/19 08:39  03/06/19 08:39  03/06/19 08:39











- Medications


Medications: 


                               Current Medications





Acetaminophen (Tylenol 325mg Tab)  650 mg PO Q6 PRN


   PRN Reason: Pain, moderate (4-7)


Acetaminophen (Tylenol 325mg Tab)  325 mg PO Q6 PRN


   PRN Reason: Pain, Mild (1-3)


Albuterol/Ipratropium (Duoneb 3 Mg/0.5 Mg (3 Ml) Ud)  3 ml INH RQ6 PRN


   PRN Reason: Shortness of Breath


Apixaban (Eliquis)  5 mg PO BID [facility]; Protocol


   Last Admin: 03/05/19 16:24 Dose:  5 mg


Betamethasone Dipropion Augmented (Diprolene Af)  0 gm TOP BID [facility]


   Last Admin: 03/05/19 16:24 Dose:  1 applic


Bisoprolol Fumarate (Zebeta)  5 mg PO DAILY [facility]


   Last Admin: 03/05/19 09:15 Dose:  5 mg


Diltiazem HCl (Cardizem Cd)  120 mg PO DAILY [facility]


   Last Admin: 03/05/19 09:18 Dose:  120 mg


Furosemide (Lasix)  40 mg PO DAILY [facility]


   Last Admin: 03/05/19 09:16 Dose:  40 mg


Isosorbide Mononitrate (Imdur Er)  30 mg PO DAILY [facility]


   Last Admin: 03/05/19 09:15 Dose:  30 mg


Lisinopril (Zestril)  20 mg PO DAILY [facility]


   Last Admin: 03/05/19 09:16 Dose:  20 mg











- Labs


Labs: 


                                        





                                 03/06/19 07:00 





                                 03/03/19 06:14 











- Additional Findings


Additional findings: 





- Additional Findings


Additional findings: 





- Constitutional


Appears: No Acute Distress





- Head Exam


Head Exam: ATRAUMATIC, NORMAL INSPECTION, NORMOCEPHALIC





- Eye Exam


Eye Exam: EOMI





- ENT Exam


ENT Exam: Mucous Membranes Moist





- Neck Exam


Neck Exam: Full ROM





- Respiratory Exam


Respiratory Exam: Prolonged Expiratory Phase





- Cardiovascular Exam


Cardiovascular Exam: REGULAR RHYTHM, +S1, +S2





- GI/Abdominal Exam


GI & Abdominal Exam: Soft





- Extremities Exam


Additional comments: 





LE with ace wrap in place( done by podiatry)





- Neurological Exam


Neurological Exam: Alert, Awake, Oriented x3





- Psychiatric Exam


Psychiatric exam: Normal Affect





- Skin


Skin Exam: Normal Color, Warm


Additional comments: 





eryhtematous patchy rash that extends to the thighs, trunk, back and internal 

aspect of upper extremities. 


 rash is unchanged today. 











Assessment and Plan





- Assessment and Plan (Free Text)


Assessment: 





#. Bilateral  Lower extremity Stasis dermatitis with leg cellulitis


-chronic stasis dermatitis , aggravated by edema  improving, cellulitis 

diagnosis less likely at this time.


-Dopple venous US negative for DVT


-Pt was started on IV Vanco in ED, will discontinue Vanco due to patchy 

erythematous patchy rash likely due to Vanco use, also cellulitis less likely at

this time.


- Podiatry consulted, will f/u recomms  


- keep leg elevated


- Podiatry wrapped ACE bandage on both LE to decrease edema


-Vanco trough last 10.1 


-PT eval and tx





#Erythemaotous patchy rash


All meds reviewed, new meds started during hospitalization were stopped.


Likely secondary to use of new medications, Vanco stopped. actual cause 

inconclusive 


-Hidrocortison 100 mg IV x 1 second dose given today


-Benadryl 50 IV given today 


-Betamethasone Dipropionate top BID


-Will f/u resolution





#. Chronic COPD


--On Oxygen by [facility] at home , cont 2 LPM


--Pulmonology consult, Dr Pyle, recomms appreciated 


- cont Duoneb 





#. Acute on chronic CHF exacerbation diastolic dysfunction- preserved EF


--Last echocardiogram on 02/04/19: LVEF 50-55%.


--Furosemide 40mg IV STAT was given 


- cont PO Lasix, Lisinopril, Imdur and Bisoprolol





#. Paroxysmal Afib 


--Rate controlled


--Continue with Cardizem, Bisoprolol and Eliquis





#DVT prophylaxis


--On Eliquis BID





Diet


--Heart healthy diet

## 2019-03-06 NOTE — CP.PCM.PN
Subjective





- Date & Time of Evaluation


Date of Evaluation: 03/06/19


Time of Evaluation: 17:27





- Subjective


Subjective: 





Podiatry Consult Note: Dr. Barrera





87 year old female patient seen and evaluated at the bedside with  

for b/l lower extremity chronic stasis edema, dermatitis with possible 

cellulitis. Patient states that she has some pain in her legs. Patient states 

that she still  has rash in her arms for which she is receiving Benadryl. She 

denies any overnight nausea/vomiting/fever/chest pain/chills, or shortness of 

breath. 

















Objective





- Vital Signs/Intake and Output


Vital Signs (last 24 hours): 


                                        











Temp Pulse Resp BP Pulse Ox


 


 97.7 F   57 L  18   144/74   94 L


 


 03/06/19 16:40  03/06/19 16:40  03/06/19 16:40  03/06/19 09:09  03/06/19 16:40











- Medications


Medications: 


                               Current Medications





Acetaminophen (Tylenol 325mg Tab)  650 mg PO Q6 PRN


   PRN Reason: Pain, moderate (4-7)


Acetaminophen (Tylenol 325mg Tab)  325 mg PO Q6 PRN


   PRN Reason: Pain, Mild (1-3)


Albuterol/Ipratropium (Duoneb 3 Mg/0.5 Mg (3 Ml) Ud)  3 ml INH RQ6 PRN


   PRN Reason: Shortness of Breath


Apixaban (Eliquis)  5 mg PO BID [facility]; Protocol


   Last Admin: 03/06/19 09:08 Dose:  5 mg


Betamethasone Dipropion Augmented (Diprolene Af)  0 gm TOP BID [facility]


   Last Admin: 03/06/19 09:06 Dose:  1 applic


Bisoprolol Fumarate (Zebeta)  5 mg PO DAILY [facility]


   Last Admin: 03/06/19 09:09 Dose:  5 mg


Diltiazem HCl (Cardizem Cd)  120 mg PO DAILY [facility]


   Last Admin: 03/06/19 09:08 Dose:  120 mg


Furosemide (Lasix)  40 mg PO DAILY [facility]


   Last Admin: 03/06/19 09:08 Dose:  40 mg


Isosorbide Mononitrate (Imdur Er)  30 mg PO DAILY [facility]


   Last Admin: 03/06/19 09:08 Dose:  30 mg


Lisinopril (Zestril)  20 mg PO DAILY [facility]


   Last Admin: 03/06/19 09:09 Dose:  20 mg











- Labs


Labs: 


                                        





                                 03/06/19 07:00 





                                 03/03/19 06:14 











- Constitutional


Appears: Well, No Acute Distress





- Head Exam


Head Exam: ATRAUMATIC, NORMOCEPHALIC





- Extremities Exam


Additional comments: 








B/L lower extremities focused exam:





Vascular: DP/PT 1/4, Cap refill < 3 seconds, Temp gradient warm to warm, Mild 

pitting edema appreciated to bilateral lower extremities, Edema is improving.





Neuro: Gross and protective sensation intact.





Derm: B/L erythema to b/l lower extremities circumfrentially beginning at tibial

tuberosity extending down to digits, which decrease in erythema when elevated. 

No open lesions at this time, no weeping or drainage appreciated. No purulence, 

nor streaking appreciated. Elongated, dystrophic nails.





MSK: Pain with palpation of posterior aspect of b/l lower extremities, MMT 4/5 

in all groups b/l.


 

















- Neurological Exam


Neurological Exam: Alert, Awake





- Psychiatric Exam


Psychiatric exam: Normal Affect, Normal Mood





Assessment and Plan





- Assessment and Plan (Free Text)


Assessment: 





87 year old female patient seen and evaluated at the bedside with  

for b/l lower extremity chronic stasis edema, dermatitis with possible 

cellulitis. 











Plan: 





Patient seen and evaluated 


Discussed patient plan in detail with Dr. Barrera


Charts, labs and vitals reviewed: Afebrile, WBCs 16.9 , ESR > 120 


Ammonium lactate 12% lotion and Ace bandage applied to lower extremities  no 

open wounds or weeping at this time 


Patient to continue elevating lower extremities.


LE US; negative for DVT 


Blood cultures taken; No growth after 48 hours


Continue IV abx as per primary team.


C/W pain management as needed. 


Continue wearing surgical shoes when ambulating.


Podiatry will continue to follow up the patient upon discharge.

## 2019-03-07 LAB
ALBUMIN SERPL-MCNC: 3.3 G/DL (ref 3.5–5)
ALBUMIN/GLOB SERPL: 1 {RATIO} (ref 1–2.1)
ALT SERPL-CCNC: 43 U/L (ref 9–52)
AST SERPL-CCNC: 27 U/L (ref 14–36)
BASOPHILS # BLD AUTO: 0 K/UL (ref 0–0.2)
BASOPHILS NFR BLD: 0.1 % (ref 0–2)
BUN SERPL-MCNC: 25 MG/DL (ref 7–17)
CALCIUM SERPL-MCNC: 8.9 MG/DL (ref 8.4–10.2)
EOSINOPHIL # BLD AUTO: 0.7 K/UL (ref 0–0.7)
EOSINOPHIL NFR BLD: 4.3 % (ref 0–4)
ERYTHROCYTE [DISTWIDTH] IN BLOOD BY AUTOMATED COUNT: 15.7 % (ref 11.5–14.5)
GFR NON-AFRICAN AMERICAN: 52
HGB BLD-MCNC: 11.1 G/DL (ref 12–16)
LYMPHOCYTES # BLD AUTO: 1.1 K/UL (ref 1–4.3)
LYMPHOCYTES NFR BLD AUTO: 6.3 % (ref 20–40)
MCH RBC QN AUTO: 28.1 PG (ref 27–31)
MCHC RBC AUTO-ENTMCNC: 32.8 G/DL (ref 33–37)
MCV RBC AUTO: 85.9 FL (ref 81–99)
MONOCYTES # BLD: 0.7 K/UL (ref 0–0.8)
MONOCYTES NFR BLD: 3.9 % (ref 0–10)
NEUTROPHILS # BLD: 14.7 K/UL (ref 1.8–7)
NEUTROPHILS NFR BLD AUTO: 85.4 % (ref 50–75)
NRBC BLD AUTO-RTO: 0.1 % (ref 0–0)
PLATELET # BLD: 382 K/UL (ref 130–400)
PMV BLD AUTO: 7.6 FL (ref 7.2–11.7)
RBC # BLD AUTO: 3.96 MIL/UL (ref 3.8–5.2)
WBC # BLD AUTO: 17.2 K/UL (ref 4.8–10.8)

## 2019-03-07 RX ADMIN — BETAMETHASONE DIPROPIONATE SCH: 0.5 CREAM TOPICAL at 15:42

## 2019-03-07 RX ADMIN — BETAMETHASONE DIPROPIONATE SCH APPLIC: 0.5 OINTMENT TOPICAL at 17:33

## 2019-03-07 RX ADMIN — DILTIAZEM HYDROCHLORIDE SCH MG: 120 CAPSULE, COATED, EXTENDED RELEASE ORAL at 11:42

## 2019-03-07 NOTE — CARD
--------------- APPROVED REPORT --------------





Date of service: 03/02/2019



EKG Measurement

Heart [id number]

NH [id number]

[id number]

[id number]

[id number]



<Conclusion>

Normal sinus rhythm

Normal Electrocardiogram

## 2019-03-07 NOTE — CP.PCM.PN
<Benjie LewisPedrito - Last Filed: 03/07/19 17:21>





Subjective





- Date & Time of Evaluation


Date of Evaluation: 03/07/19


Time of Evaluation: 09:05





- Subjective


Subjective: 





Patient seen and examined this morning at bedside, in NAD. Patient's DC to TCU 

held yesterday due to c/o vertigo with nausea and vomiting after patient had 

reaction most likely to benadryl used for itching, pt was treated with Meclizine

and Zofran IV with resolution of her symptoms. Patient today c/o mild itching, 

rash is present with noted mild improvement today, dermatology was consulted. 

Patient denies SOB, CP, N/V/D, fever or other acute medical complaint at this 

time.





Objective





- Vital Signs/Intake and Output


Vital Signs (last 24 hours): 


                                        











Temp Pulse Resp BP Pulse Ox


 


 98.2 F   58 L  18   110/61   94 L


 


 03/07/19 16:43  03/07/19 16:43  03/07/19 16:43  03/07/19 16:43  03/07/19 16:43











- Medications


Medications: 


                               Current Medications





Acetaminophen (Tylenol 325mg Tab)  650 mg PO Q6 PRN


   PRN Reason: Pain, moderate (4-7)


Acetaminophen (Tylenol 325mg Tab)  325 mg PO Q6 PRN


   PRN Reason: Pain, Mild (1-3)


Albuterol/Ipratropium (Duoneb 3 Mg/0.5 Mg (3 Ml) Ud)  3 ml INH RQ6 PRN


   PRN Reason: Shortness of Breath


Apixaban (Eliquis)  5 mg PO BID [facility]; Protocol


   Last Admin: 03/07/19 11:36 Dose:  5 mg


Betamethasone Dipropionate (Diprolene)  0 applic TOP BID [facility]


Bisoprolol Fumarate (Zebeta)  5 mg PO DAILY [facility]


   Last Admin: 03/07/19 11:39 Dose:  Not Given


Diltiazem HCl (Cardizem Cd)  120 mg PO DAILY [facility]


   Last Admin: 03/07/19 11:42 Dose:  120 mg


Furosemide (Lasix)  40 mg PO DAILY [facility]


   Last Admin: 03/07/19 11:34 Dose:  40 mg


Isosorbide Mononitrate (Imdur Er)  30 mg PO DAILY [facility]


   Last Admin: 03/07/19 11:39 Dose:  Not Given


Lisinopril (Zestril)  20 mg PO DAILY [facility]


   Last Admin: 03/07/19 11:39 Dose:  Not Given


Meclizine HCl (Antivert)  12.5 mg PO DAILY PRN


   PRN Reason: vertigo











- Labs


Labs: 


                                        





                                 03/07/19 06:15 





                                 03/07/19 06:15 











- Additional Findings


Additional findings: 





 Additional Findings


Additional findings: 





- Constitutional


Appears: No Acute Distress





- Head Exam


Head Exam: ATRAUMATIC, NORMAL INSPECTION, NORMOCEPHALIC





- Eye Exam


Eye Exam: EOMI





- ENT Exam


ENT Exam: Mucous Membranes Moist





- Neck Exam


Neck Exam: Full ROM





- Respiratory Exam


Respiratory Exam: Prolonged Expiratory Phase





- Cardiovascular Exam


Cardiovascular Exam: REGULAR RHYTHM, +S1, +S2





- GI/Abdominal Exam


GI & Abdominal Exam: Soft





- Extremities Exam


Additional comments: 





LE with ace wrap in place( done by podiatry)





- Neurological Exam


Neurological Exam: Alert, Awake, Oriented x3





- Psychiatric Exam


Psychiatric exam: Normal Affect





- Skin


Skin Exam: Normal Color, Warm


Additional comments: 





eryhtematous patchy rash that extends to the thighs, trunk, back and internal 

aspect of upper extremities. 


Today the rash is comparatively improved in the area of the chest and upper 

extremities. 











Assessment and Plan





- Assessment and Plan (Free Text)


Assessment: 


86 y/o F with a PMHx of COPD, CAD, CHF, paroxysmal AFib and chronic b/l leg 

dermatitis and swelling admitted for evaluation and examination of intractable 

b/l leg pain and aggravating bilateral lower leg redness and swelling. 





#. Bilateral  Lower extremity Stasis dermatitis with leg cellulitis


-chronic stasis dermatitis , aggravated by edema  improving, cellulitis 

diagnosis less likely at this time.


-Dopple venous US negative for DVT


-Pt was started on IV Vanco in ED, will discontinue Vanco due to patchy 

erythematous patchy rash likely due to Vanco use, also cellulitis less likely at

this time.


- Podiatry consulted, will f/u recomms  


- keep leg elevated


- Podiatry wrapped ACE bandage on both LE to decrease edema


-Vanco trough last 10.1 , stopped


-PT eval and tx





#Erythemaotous patchy rash


All meds reviewed, new meds started during hospitalization were stopped.


Likely secondary to use of new medications, Vanco stopped. actual cause 

inconclusive 


-Hidrocortison 100 mg IV 


-Benadryl 50 IV given today 


-Betamethasone Dipropionate top BID


-Will f/u resolution





#. Chronic COPD


--On Oxygen by [facility] at home , cont 2 LPM


--Pulmonology consult, Dr Pyle, recomms appreciated 


- cont Duoneb 





#. Acute on chronic CHF exacerbation diastolic dysfunction- preserved EF


--Last echocardiogram on 02/04/19: LVEF 50-55%.


- cont PO Lasix, Lisinopril, Imdur and Bisoprolol





#. Paroxysmal Afib 


--Rate controlled


--Continue with Cardizem, Bisoprolol and Eliquis





#DVT prophylaxis


--On Eliquis BID





Diet


--Heart healthy diet





<Madisyn Merrill - Last Filed: 03/07/19 17:57>





Objective





- Vital Signs/Intake and Output


Vital Signs (last 24 hours): 


                                        











Temp Pulse Resp BP Pulse Ox


 


 98.2 F   58 L  18   110/61   94 L


 


 03/07/19 16:43  03/07/19 16:43  03/07/19 16:43  03/07/19 16:43  03/07/19 16:43











- Medications


Medications: 


                               Current Medications





Acetaminophen (Tylenol 325mg Tab)  650 mg PO Q6 PRN


   PRN Reason: Pain, moderate (4-7)


Acetaminophen (Tylenol 325mg Tab)  325 mg PO Q6 PRN


   PRN Reason: Pain, Mild (1-3)


Albuterol/Ipratropium (Duoneb 3 Mg/0.5 Mg (3 Ml) Ud)  3 ml INH RQ6 PRN


   PRN Reason: Shortness of Breath


Apixaban (Eliquis)  5 mg PO BID [facility]; Protocol


   Last Admin: 03/07/19 17:32 Dose:  5 mg


Betamethasone Dipropionate (Diprolene)  0 applic TOP BID [facility]


   Last Admin: 03/07/19 17:33 Dose:  1 applic


Bisoprolol Fumarate (Zebeta)  5 mg PO DAILY [facility]


   Last Admin: 03/07/19 11:39 Dose:  Not Given


Diltiazem HCl (Cardizem Cd)  120 mg PO DAILY [facility]


   Last Admin: 03/07/19 11:42 Dose:  120 mg


Furosemide (Lasix)  40 mg PO DAILY [facility]


   Last Admin: 03/07/19 11:34 Dose:  40 mg


Isosorbide Mononitrate (Imdur Er)  30 mg PO DAILY [facility]


   Last Admin: 03/07/19 11:39 Dose:  Not Given


Lisinopril (Zestril)  20 mg PO DAILY [facility]


   Last Admin: 03/07/19 11:39 Dose:  Not Given


Meclizine HCl (Antivert)  12.5 mg PO DAILY PRN


   PRN Reason: vertigo











- Labs


Labs: 


                                        





                                 03/07/19 06:15 





                                 03/07/19 06:15 











Attending/Attestation





- Attestation


I have personally seen and examined this patient.: Yes


I have fully participated in the care of the patient.: Yes


I have reviewed all pertinent clinical information, including history, physical 

exam and plan: Yes


Notes (Text): 








1.Diffuse Pruritic erythematous rash


- most likely allergic reaction , drug induced  prob from [facility] 


-Dermatology consulted


-cont Hydrocortizone IV  


2.Chronic bilateral LE stasis dermatitis


- podiatry consulted .ACE wrapping





3. Vertigo


 - Meclizine PRN given  and PT consulted  


- plan to d/c to TCU


4. Chronic COPD on home O2


- stable. Pulmonary on consult 


5. Obesity BMI 47


6. Paroxysmal Afib


- at present in SR. 


-Continue cardizem PO and Eliquis


7.HTN


 - controlled on home meds , Lisinopril, Bisoprolol

## 2019-03-08 ENCOUNTER — HOSPITAL ENCOUNTER (INPATIENT)
Dept: HOSPITAL 14 - H.TCU | Age: 84
LOS: 10 days | Discharge: HOME HEALTH | DRG: 603 | End: 2019-03-18
Attending: INTERNAL MEDICINE | Admitting: INTERNAL MEDICINE
Payer: MEDICARE

## 2019-03-08 VITALS
SYSTOLIC BLOOD PRESSURE: 123 MMHG | TEMPERATURE: 97.7 F | OXYGEN SATURATION: 95 % | DIASTOLIC BLOOD PRESSURE: 63 MMHG | RESPIRATION RATE: 18 BRPM

## 2019-03-08 VITALS — BODY MASS INDEX: 47.6 KG/M2

## 2019-03-08 VITALS — HEART RATE: 60 BPM

## 2019-03-08 DIAGNOSIS — Z95.5: ICD-10-CM

## 2019-03-08 DIAGNOSIS — E78.00: ICD-10-CM

## 2019-03-08 DIAGNOSIS — Z87.891: ICD-10-CM

## 2019-03-08 DIAGNOSIS — I48.0: ICD-10-CM

## 2019-03-08 DIAGNOSIS — I87.2: ICD-10-CM

## 2019-03-08 DIAGNOSIS — R09.02: ICD-10-CM

## 2019-03-08 DIAGNOSIS — J43.9: ICD-10-CM

## 2019-03-08 DIAGNOSIS — I25.10: ICD-10-CM

## 2019-03-08 DIAGNOSIS — Z88.1: ICD-10-CM

## 2019-03-08 DIAGNOSIS — Z88.0: ICD-10-CM

## 2019-03-08 DIAGNOSIS — L03.115: Primary | ICD-10-CM

## 2019-03-08 DIAGNOSIS — I11.0: ICD-10-CM

## 2019-03-08 DIAGNOSIS — L03.116: ICD-10-CM

## 2019-03-08 DIAGNOSIS — I50.32: ICD-10-CM

## 2019-03-08 LAB
ALBUMIN SERPL-MCNC: 3.1 G/DL (ref 3.5–5)
ALBUMIN/GLOB SERPL: 1 {RATIO} (ref 1–2.1)
ALT SERPL-CCNC: 41 U/L (ref 9–52)
AST SERPL-CCNC: 29 U/L (ref 14–36)
BUN SERPL-MCNC: 24 MG/DL (ref 7–17)
CALCIUM SERPL-MCNC: 8.6 MG/DL (ref 8.4–10.2)
ERYTHROCYTE [DISTWIDTH] IN BLOOD BY AUTOMATED COUNT: 16.1 % (ref 11.5–14.5)
GFR NON-AFRICAN AMERICAN: 52
HGB BLD-MCNC: 11 G/DL (ref 12–16)
MCH RBC QN AUTO: 27.5 PG (ref 27–31)
MCHC RBC AUTO-ENTMCNC: 31.7 G/DL (ref 33–37)
MCV RBC AUTO: 86.5 FL (ref 81–99)
PLATELET # BLD: 369 K/UL (ref 130–400)
RBC # BLD AUTO: 4 MIL/UL (ref 3.8–5.2)
WBC # BLD AUTO: 14.6 K/UL (ref 4.8–10.8)

## 2019-03-08 PROCEDURE — F08Z1FZ DRESSING TECHNIQUES TREATMENT USING ASSISTIVE, ADAPTIVE, SUPPORTIVE OR PROTECTIVE EQUIPMENT: ICD-10-PCS | Performed by: INTERNAL MEDICINE

## 2019-03-08 PROCEDURE — F07Z9FZ GAIT TRAINING/FUNCTIONAL AMBULATION TREATMENT USING ASSISTIVE, ADAPTIVE, SUPPORTIVE OR PROTECTIVE EQUIPMENT: ICD-10-PCS | Performed by: INTERNAL MEDICINE

## 2019-03-08 PROCEDURE — F07Z8FZ TRANSFER TRAINING TREATMENT USING ASSISTIVE, ADAPTIVE, SUPPORTIVE OR PROTECTIVE EQUIPMENT: ICD-10-PCS | Performed by: INTERNAL MEDICINE

## 2019-03-08 PROCEDURE — F07L6GZ THERAPEUTIC EXERCISE TREATMENT OF MUSCULOSKELETAL SYSTEM - LOWER BACK / LOWER EXTREMITY USING AEROBIC ENDURANCE AND CONDITIONING EQUIPMENT: ICD-10-PCS | Performed by: INTERNAL MEDICINE

## 2019-03-08 RX ADMIN — DILTIAZEM HYDROCHLORIDE SCH MG: 120 CAPSULE, COATED, EXTENDED RELEASE ORAL at 08:48

## 2019-03-08 RX ADMIN — BETAMETHASONE DIPROPIONATE SCH APPLIC: 0.5 OINTMENT TOPICAL at 08:50

## 2019-03-08 NOTE — CP.PCM.CON
History of Present Illness





- History of Present Illness


History of Present Illness: 





Pt is an 87 year old female admitted to [facility] and referred to the writer 

for evaluation.   Pt denied medical issues apart from oxygen use pior to 

admission.  She reported sliding on the floor and being unable to get up prior 

to admission.  See medical record for complete medical history/medication list. 

Social History: Pt lives alone in a senior building and is very happy there.  

She is  and has one living child.  Her other child passed away 2 years 

ago.  Pt reported very positive relationships with family and the desire "not to

burden" her daughter.  She spoke of a desire for a homemaker on dischage to 

assist.  Pt denied a psych history, denied a history of alc/sub abuse and no 

current smoking.  Pt spoke of her active life in her building with [employer], trips,

etc.  


On interview, pt was alert, oriented to x3, pt able to state the year, month, 

day/date, president and mayor of [place].  Cognitive functioining intact on 

screening.  Pt spoke of consenting to rehab to ease the pressure on her 

daughter.  Pt spoke of her desire for healing and return to her active life.


Dx: Adjustment Dx with depression.


Plan: Continued sup therapy


Pt would benefit from homemaker services on discharge.





Thank you for this referral,


Dr. Pearson





Past Patient History





- Infectious Disease


Hx of Infectious Diseases: None





- Tetanus Immunizations


Tetanus Immunization: Unknown





- Past Medical History & Family History


Past Medical History?: Yes





- Past Social History


Smoking Status: Former Smoker





- CARDIAC


Hx Atrial Fibrillation: Yes


Hx Congestive Heart Failure: Yes


Hx Hypercholesterolemia: Yes


Hx Hypertension: Yes


Hx Pacemaker: No


Hx Peripheral Edema: Yes





- PULMONARY


Hx Chronic Obstructive Pulmonary Disease (COPD): Yes


Hx Emphysema: Yes


Hx Pneumonia: Yes





- NEUROLOGICAL


Hx Neurological Disorder: Yes


Hx Syncope: Yes


Other/Comment: AMS on recent admission.





- HEENT


Hx HEENT Problems: No





- RENAL


Hx Chronic Kidney Disease: No





- ENDOCRINE/METABOLIC


Hx Endocrine Disorders: Yes


Other/Comment: Multinodular thyroid.





- HEMATOLOGICAL/ONCOLOGICAL


Hx Human Immunodeficiency Virus (HIV): No





- INTEGUMENTARY


Hx Dermatological Problems: Yes


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





- MUSCULOSKELETAL/RHEUMATOLOGICAL


Hx Fractures: Yes (right ankle)





- GASTROINTESTINAL


Hx Gastrointestinal Disorders: No





- GENITOURINARY/GYNECOLOGICAL


Hx Genitourinary Disorders: Yes


Hx Incontinence: Yes


Other/Comment: Stress Incontinence





- PSYCHIATRIC


Hx Psychophysiologic Disorder: No


Hx Substance Use: No





- SURGICAL HISTORY


Hx Appendectomy: No


Hx Coronary Stent: Yes (x 2)





- ANESTHESIA


Hx Anesthesia: Yes


Hx Anesthesia Reactions: No


Hx Malignant Hyperthermia: No





Meds


Home Medications: 


                              Home Medication List











 Medication  Instructions  Recorded  Confirmed  Type


 


Acetaminophen [Tylenol 325mg tab] 325 mg PO Q6 PRN  tab 03/06/19  Rx


 


Albuterol/Ipratropium [Duoneb 3 3 ml INH RQ6 PRN  neb 03/06/19  Rx





mg/0.5 mg (3 ml) UD]    











Allergies/Adverse Reactions: 


                                    Allergies











Allergy/AdvReac Type Severity Reaction Status Date / Time


 


Penicillins Allergy Mild SWELLING Verified 02/06/19 15:47


 


vancomycin Allergy  RASH Verified 03/06/19 07:55














- Medications


Medications: 


                               Current Medications





Acetaminophen (Tylenol 325mg Tab)  650 mg PO Q6 PRN


   PRN Reason: Pain, moderate (4-7)


   Last Admin: 03/08/19 06:14 Dose:  650 mg


Acetaminophen (Tylenol 325mg Tab)  325 mg PO Q6 PRN


   PRN Reason: Pain, Mild (1-3)


Albuterol/Ipratropium (Duoneb 3 Mg/0.5 Mg (3 Ml) Ud)  3 ml INH RQ6 PRN


   PRN Reason: Shortness of Breath


Apixaban (Eliquis)  5 mg PO BID [facility]; Protocol


   Last Admin: 03/08/19 08:48 Dose:  5 mg


Betamethasone Dipropionate (Diprolene)  0 applic TOP BID [facility]


   Last Admin: 03/08/19 08:50 Dose:  1 applic


Bisoprolol Fumarate (Zebeta)  5 mg PO DAILY [facility]


   Last Admin: 03/08/19 08:49 Dose:  5 mg


Diltiazem HCl (Cardizem Cd)  120 mg PO DAILY [facility]


   Last Admin: 03/08/19 08:48 Dose:  120 mg


Furosemide (Lasix)  40 mg PO DAILY [facility]


   Last Admin: 03/08/19 08:50 Dose:  40 mg


Isosorbide Mononitrate (Imdur Er)  30 mg PO DAILY [facility]


   Last Admin: 03/08/19 08:48 Dose:  30 mg


Lisinopril (Zestril)  20 mg PO DAILY [facility]


   Last Admin: 03/08/19 08:49 Dose:  20 mg


Meclizine HCl (Antivert)  12.5 mg PO DAILY [facility]


   PRN Reason: vertigo











Results





- Vital Signs


Recent Vital Signs: 


                                Last Vital Signs











Temp  97.6 F   03/08/19 08:24


 


Pulse  85   03/08/19 08:49


 


Resp  20   03/08/19 08:24


 


BP  121/68   03/08/19 08:50


 


Pulse Ox  97   03/08/19 08:24














- Labs


Result Diagrams: 


                                 03/08/19 06:20





                                 03/08/19 06:20


Labs: 


                         Laboratory Results - last 24 hr











  03/08/19 03/08/19





  06:20 06:20


 


WBC  14.6 H 


 


RBC  4.00 


 


Hgb  11.0 L 


 


Hct  34.6 


 


MCV  86.5 


 


MCH  27.5 


 


MCHC  31.7 L 


 


RDW  16.1 H 


 


Plt Count  369 


 


Sodium   141


 


Potassium   4.0


 


Chloride   96 L


 


Carbon Dioxide   39 H


 


Anion Gap   10


 


BUN   24 H


 


Creatinine   1.0


 


Est GFR ( Amer)   > 60


 


Est GFR (Non-Af Amer)   52


 


Random Glucose   92


 


Calcium   8.6


 


Total Bilirubin   0.2


 


AST   29


 


ALT   41


 


Alkaline Phosphatase   157 H


 


Total Protein   6.2 L


 


Albumin   3.1 L


 


Globulin   3.1


 


Albumin/Globulin Ratio   1.0

## 2019-03-08 NOTE — CP.PCM.DIS
<Benjie Pedrito Lewis - Last Filed: 03/08/19 15:53>





Provider





- Provider


Date of Admission: 


03/02/19 18:50





Attending physician: 


Daniel Greer MD





Consults: 








03/02/19 20:49


Physician Consult Stat 


   Comment: 


   Consulting Provider: Lewis Sandoval


   Consulting Physician: Lewis Sandoval


   Reason for Consult: COPD





03/02/19 20:56


Podiatry Consult Stat 


   Comment: 


   Consulting Provider: Tamir Barrera


   Consulting Physician: Tamir Barrera


   Reason for Consult: dermatitis





03/04/19 16:27


Psychology Consult Routine 


   Comment: 


   Consulting Provider: Catie Pearson


   Consulting Physician: Catie Pearson


   Reason for Consult: please evaluate





03/06/19 16:25


Physician Consult Routine 


   Comment: 


   Consulting Provider: Carlos Roger


   Consulting Physician: Carlos Roger


   Reason for Consult: please evaluate rash











Time Spent in preparation of Discharge (in minutes): 33





Diagnosis





- Discharge Diagnosis


(1) Rash and nonspecific skin eruption


Status: Acute   





(2) Leg pain, bilateral


Status: Chronic   





(3) Stasis dermatitis


Status: Chronic   





(4) COPD (chronic obstructive pulmonary disease)


Status: Chronic   Priority: High   





(5) Physical deconditioning


Status: Chronic   Priority: High   





Hospital Course





- Lab Results


Lab Results: 


                                  Micro Results





03/02/19 18:07   Blood   Blood Culture - Final


                            NO GROWTH AFTER 5 DAYS


03/02/19 18:07   Blood   Gram Stain - Final


                            TEST NOT PERFORMED


03/02/19 18:07   Blood   Blood Culture - Final


                            NO GROWTH AFTER 5 DAYS


03/02/19 18:07   Blood   Gram Stain - Final


                            TEST NOT PERFORMED





                             Most Recent Lab Values











WBC  14.6 K/uL (4.8-10.8)  H  03/08/19  06:20    


 


RBC  4.00 Mil/uL (3.80-5.20)   03/08/19  06:20    


 


Hgb  11.0 g/dL (12.0-16.0)  L  03/08/19  06:20    


 


Hct  34.6 % (34.0-47.0)   03/08/19  06:20    


 


MCV  86.5 fl (81.0-99.0)   03/08/19  06:20    


 


MCH  27.5 pg (27.0-31.0)   03/08/19  06:20    


 


MCHC  31.7 g/dL (33.0-37.0)  L  03/08/19  06:20    


 


RDW  16.1 % (11.5-14.5)  H  03/08/19  06:20    


 


Plt Count  369 K/uL (130-400)   03/08/19  06:20    


 


MPV  7.6 fl (7.2-11.7)   03/07/19  06:15    


 


Neut % (Auto)  85.4 % (50.0-75.0)  H  03/07/19  06:15    


 


Lymph % (Auto)  6.3 % (20.0-40.0)  L  03/07/19  06:15    


 


Mono % (Auto)  3.9 % (0.0-10.0)   03/07/19  06:15    


 


Eos % (Auto)  4.3 % (0.0-4.0)  H  03/07/19  06:15    


 


Baso % (Auto)  0.1 % (0.0-2.0)   03/07/19  06:15    


 


Neut # (Auto)  14.7 K/uL (1.8-7.0)  H  03/07/19  06:15    


 


Lymph # (Auto)  1.1 K/uL (1.0-4.3)   03/07/19  06:15    


 


Mono # (Auto)  0.7 K/uL (0.0-0.8)   03/07/19  06:15    


 


Eos # (Auto)  0.7 K/uL (0.0-0.7)   03/07/19  06:15    


 


Baso # (Auto)  0.0 K/uL (0.0-0.2)   03/07/19  06:15    


 


Neutrophils % (Manual)  84 % (42-75)  H  03/06/19  07:00    


 


Lymphocytes % (Manual)  9 % (20-50)  L  03/06/19  07:00    


 


Monocytes % (Manual)  4 % (0-10)   03/06/19  07:00    


 


Eosinophils % (Manual)  3 % (0-7)   03/06/19  07:00    


 


Basophils % (Manual)  1 % (0-2)   03/02/19  17:50    


 


Platelet Estimate  Slightly increased  (NORMAL)  H  03/06/19  07:00    


 


Hypochromasia (manual)  Slight   03/06/19  07:00    


 


Anisocytosis (manual)  Slight   03/06/19  07:00    


 


ESR  105 mm/hr (0-30)  H  03/06/19  07:00    


 


Sodium  141 mmol/l (132-148)   03/08/19  06:20    


 


Potassium  4.0 MMOL/L (3.6-5.0)   03/08/19  06:20    


 


Chloride  96 mmol/L ()  L  03/08/19  06:20    


 


Carbon Dioxide  39 mmol/L (22-30)  H  03/08/19  06:20    


 


Anion Gap  10  (10-20)   03/08/19  06:20    


 


BUN  24 mg/dl (7-17)  H  03/08/19  06:20    


 


Creatinine  1.0 mg/dl (0.7-1.2)   03/08/19  06:20    


 


Est GFR ( Amer)  > 60   03/08/19  06:20    


 


Est GFR (Non-Af Amer)  52   03/08/19  06:20    


 


Random Glucose  92 mg/dL ()   03/08/19  06:20    


 


Calcium  8.6 mg/dL (8.4-10.2)   03/08/19  06:20    


 


Total Bilirubin  0.2 mg/dl (0.2-1.3)   03/08/19  06:20    


 


AST  29 U/L (14-36)   03/08/19  06:20    


 


ALT  41 U/L (9-52)   03/08/19  06:20    


 


Alkaline Phosphatase  157 U/L ()  H  03/08/19  06:20    


 


NT-Pro-B Natriuret Pep  253 pg/ml (0-900)   03/02/19  17:50    


 


Total Protein  6.2 G/DL (6.3-8.2)  L  03/08/19  06:20    


 


Albumin  3.1 g/dL (3.5-5.0)  L  03/08/19  06:20    


 


Globulin  3.1 gm/dL (2.2-3.9)   03/08/19  06:20    


 


Albumin/Globulin Ratio  1.0  (1.0-2.1)   03/08/19  06:20    


 


Vancomycin Trough  10.1 ug/mL (5.0-10.0)  H  03/04/19  10:00    














- Hospital Course


Hospital Course: 





Patient was seen and evaluated bedside . All chart and clinical data 

reviewed.Case discussed with resident .


86 Y/O female with a PMH of COPD, CAD, CHF, AFib and chronic b/l chronic 

baseline leg dermatitis and swelling, who was admitted on 3/2/19 due to 

aggravating bilateral lower leg redness and swelling with diagnosis of bilateral

lower extremity stasis dermatitis with leg cellulitis. During her hospital 

course patient received treatment with IV antibiotics Vancomycin started on 

admission showing improvement of her b/l LE cellulitis on the second day after 

admission. Patient had b/l LE Doppler US done negative for DVT, WBC within range

on admission, blood cultures showed no growth after 48 hours. Patient was 

evaluated during her course by Podiatry and treated with Acewrap banding for her

chronic LE swelling and stasis dermatitis. As per podiatry patient can follow up

as outpatient.


On day 3 after admission patient was noted developing an erythematous rash on 

trunk and extremities of inconclusive cause. VAnco was discontinued.


Patient has remained afebrile during hospital course, today patient rash is 

unchanged, otherwise she denies SOB, CP, HA, or fever. Patient c/o some itching 

and nausea and benadryl IV, Zofran 4 IV, Hydrocosrtizone given on 3/6/19 with 

minimal improvement of rash, thn same day developed vertigo with nausea and 

vomiting most likely a reaction to benadryl used for itching, pt was treated 

with Meclizine and Zofran IV with resolution of her symptoms. Patient c/o 

inttermittent right ankle pain with motion, being evaluated by podiatry, ankle 

XR shows OA. Patient today is found hemodinamically stable. Decision is taken to

DC and transfer to TCU for patient will benefit with further PT to improve gait 

and ADL at this time.


Upon DC to TCU patient will continue with her home meds as per med 

reconciliation.








Discharge Exam





- Head Exam


Head Exam: ATRAUMATIC, NORMOCEPHALIC





- Additional Findings


Additional findings: 





- Additional Findings


Additional findings: 





 Additional Findings


Additional findings: 





- Constitutional


Appears: No Acute Distress





- Head Exam


Head Exam: ATRAUMATIC, NORMAL INSPECTION, NORMOCEPHALIC





- Eye Exam


Eye Exam: EOMI





- ENT Exam


ENT Exam: Mucous Membranes Moist





- Neck Exam


Neck Exam: Full ROM





- Respiratory Exam


Respiratory Exam: Prolonged Expiratory Phase





- Cardiovascular Exam


Cardiovascular Exam: REGULAR RHYTHM, +S1, +S2





- GI/Abdominal Exam


GI & Abdominal Exam: Soft





- Extremities Exam


Additional comments: 





LE with ace wrap in place( done by podiatry)


Noted tenderness to R ankle motion





- Neurological Exam


Neurological Exam: Alert, Awake, Oriented x3





- Psychiatric Exam


Psychiatric exam: Normal Affect





- Skin


Skin Exam: Normal Color, Warm


Additional comments: 





eryhtematous patchy rash that extends to the thighs, trunk, back and internal 

aspect of upper extremities. 














Discharge Plan





- Follow Up Plan


Condition: FAIR


Disposition: REHAB FACILITY/REHAB UNIT


Instructions:  Dependent Edema (DC), Urticaria (GEN), Acute Abdominal Pain (DC),

Acute Abdominal Pain (GEN), Acute Rash (DC), Acute Rash (GEN)


Additional Instructions: 


d/c to TCU


Referrals: 


Tamir Barrera MD [Staff Provider] - 


Molina Pyle MD [Family Provider] - 


Lewis Sandoval MD [Staff Provider] - 





<Madisyn Merrill - Last Filed: 03/08/19 17:29>





Provider





- Provider


Date of Admission: 


03/02/19 18:50





Attending physician: 


Daniel Greer MD





Consults: 








03/02/19 20:49


Physician Consult Stat 


   Comment: 


   Consulting Provider: Lewis Sandoval


   Consulting Physician: Lewis Sandoval


   Reason for Consult: COPD





03/02/19 20:56


Podiatry Consult Stat 


   Comment: 


   Consulting Provider: Tamir Barrera


   Consulting Physician: Tamir Barrera


   Reason for Consult: dermatitis





03/04/19 16:27


Psychology Consult Routine 


   Comment: 


   Consulting Provider: Catie Pearson


   Consulting Physician: Catie Pearson


   Reason for Consult: please evaluate





03/06/19 16:25


Physician Consult Routine 


   Comment: 


   Consulting Provider: Carlos Roger


   Consulting Physician: Carlos Roger


   Reason for Consult: please evaluate rash














Hospital Course





- Lab Results


Lab Results: 


                                  Micro Results





03/02/19 18:07   Blood   Blood Culture - Final


                            NO GROWTH AFTER 5 DAYS


03/02/19 18:07   Blood   Gram Stain - Final


                            TEST NOT PERFORMED


03/02/19 18:07   Blood   Blood Culture - Final


                            NO GROWTH AFTER 5 DAYS


03/02/19 18:07   Blood   Gram Stain - Final


                            TEST NOT PERFORMED





                             Most Recent Lab Values











WBC  14.6 K/uL (4.8-10.8)  H  03/08/19  06:20    


 


RBC  4.00 Mil/uL (3.80-5.20)   03/08/19  06:20    


 


Hgb  11.0 g/dL (12.0-16.0)  L  03/08/19  06:20    


 


Hct  34.6 % (34.0-47.0)   03/08/19  06:20    


 


MCV  86.5 fl (81.0-99.0)   03/08/19  06:20    


 


MCH  27.5 pg (27.0-31.0)   03/08/19  06:20    


 


MCHC  31.7 g/dL (33.0-37.0)  L  03/08/19  06:20    


 


RDW  16.1 % (11.5-14.5)  H  03/08/19  06:20    


 


Plt Count  369 K/uL (130-400)   03/08/19  06:20    


 


MPV  7.6 fl (7.2-11.7)   03/07/19  06:15    


 


Neut % (Auto)  85.4 % (50.0-75.0)  H  03/07/19  06:15    


 


Lymph % (Auto)  6.3 % (20.0-40.0)  L  03/07/19  06:15    


 


Mono % (Auto)  3.9 % (0.0-10.0)   03/07/19  06:15    


 


Eos % (Auto)  4.3 % (0.0-4.0)  H  03/07/19  06:15    


 


Baso % (Auto)  0.1 % (0.0-2.0)   03/07/19  06:15    


 


Neut # (Auto)  14.7 K/uL (1.8-7.0)  H  03/07/19  06:15    


 


Lymph # (Auto)  1.1 K/uL (1.0-4.3)   03/07/19  06:15    


 


Mono # (Auto)  0.7 K/uL (0.0-0.8)   03/07/19  06:15    


 


Eos # (Auto)  0.7 K/uL (0.0-0.7)   03/07/19  06:15    


 


Baso # (Auto)  0.0 K/uL (0.0-0.2)   03/07/19  06:15    


 


Neutrophils % (Manual)  84 % (42-75)  H  03/06/19  07:00    


 


Lymphocytes % (Manual)  9 % (20-50)  L  03/06/19  07:00    


 


Monocytes % (Manual)  4 % (0-10)   03/06/19  07:00    


 


Eosinophils % (Manual)  3 % (0-7)   03/06/19  07:00    


 


Basophils % (Manual)  1 % (0-2)   03/02/19  17:50    


 


Platelet Estimate  Slightly increased  (NORMAL)  H  03/06/19  07:00    


 


Hypochromasia (manual)  Slight   03/06/19  07:00    


 


Anisocytosis (manual)  Slight   03/06/19  07:00    


 


ESR  105 mm/hr (0-30)  H  03/06/19  07:00    


 


Sodium  141 mmol/l (132-148)   03/08/19  06:20    


 


Potassium  4.0 MMOL/L (3.6-5.0)   03/08/19  06:20    


 


Chloride  96 mmol/L ()  L  03/08/19  06:20    


 


Carbon Dioxide  39 mmol/L (22-30)  H  03/08/19  06:20    


 


Anion Gap  10  (10-20)   03/08/19  06:20    


 


BUN  24 mg/dl (7-17)  H  03/08/19  06:20    


 


Creatinine  1.0 mg/dl (0.7-1.2)   03/08/19  06:20    


 


Est GFR ( Amer)  > 60   03/08/19  06:20    


 


Est GFR (Non-Af Amer)  52   03/08/19  06:20    


 


Random Glucose  92 mg/dL ()   03/08/19  06:20    


 


Uric Acid  5.6 mg/Dl (2.2-7.5)   03/08/19  15:16    


 


Calcium  8.6 mg/dL (8.4-10.2)   03/08/19  06:20    


 


Total Bilirubin  0.2 mg/dl (0.2-1.3)   03/08/19  06:20    


 


AST  29 U/L (14-36)   03/08/19  06:20    


 


ALT  41 U/L (9-52)   03/08/19  06:20    


 


Alkaline Phosphatase  157 U/L ()  H  03/08/19  06:20    


 


NT-Pro-B Natriuret Pep  253 pg/ml (0-900)   03/02/19  17:50    


 


Total Protein  6.2 G/DL (6.3-8.2)  L  03/08/19  06:20    


 


Albumin  3.1 g/dL (3.5-5.0)  L  03/08/19  06:20    


 


Globulin  3.1 gm/dL (2.2-3.9)   03/08/19  06:20    


 


Albumin/Globulin Ratio  1.0  (1.0-2.1)   03/08/19  06:20    


 


Vancomycin Trough  10.1 ug/mL (5.0-10.0)  H  03/04/19  10:00    














Attending/Attestation





- Attestation


I have personally seen and examined this patient.: Yes


I have fully participated in the care of the patient.: Yes


I have reviewed all pertinent clinical information, including history, physical 

exam and plan: Yes


Notes (Text): 








1.Diffuse Pruritic erythematous rash


- most likely allergic reaction , drug induced  prob from Vanco 


-Dermatology consulted


-cont Hydrocortizone IV  , will give 50 mg IV q 12 , cont Betamethasone cream





2.Chronic bilateral LE stasis dermatitis


- podiatry consulted ACE wrapping





3. Vertigo


 - Meclizine PRN given  and PT consulted  


- d/c to TCU





4. Chronic COPD on home O2


- stable. Pulmonary on consult 


5. Obesity BMI 47





6. Paroxysmal Afib


- at present in SR. 


-Continue cardizem PO and Eliquis





7.HTN


 - controlled on home meds , Lisinopril, Bisoprolol

## 2019-03-08 NOTE — CP.PCM.PN
Subjective





- Date & Time of Evaluation


Date of Evaluation: 03/08/19


Time of Evaluation: 15:08





- Subjective


Subjective: 





Interim events reviewed.


En route to radiology for x-ray right ankle (pain).


Vital signs have been stable.


Labs noted with increased CO2, low Cl-?developing contraction alkalosis?


Still has diffuse confluent erythematous rash, sl less intense.


Will hold lasix tomorrow.


Resume xopenex/atrovent Q8H.


Will be discharged to TCU.





Objective





- Vital Signs/Intake and Output


Vital Signs (last 24 hours): 


                                        











Temp Pulse Resp BP Pulse Ox


 


 97.6 F   85   20   121/68   97 


 


 03/08/19 08:24  03/08/19 08:49  03/08/19 08:24  03/08/19 08:50  03/08/19 08:24








Intake and Output: 


                                        











 03/08/19 03/08/19





 11:59 23:59


 


Intake Total 480 


 


Output Total 280 


 


Balance 200 














- Medications


Medications: 


                               Current Medications





Acetaminophen (Tylenol 325mg Tab)  650 mg PO Q6 PRN


   PRN Reason: Pain, moderate (4-7)


   Last Admin: 03/08/19 06:14 Dose:  650 mg


Acetaminophen (Tylenol 325mg Tab)  325 mg PO Q6 PRN


   PRN Reason: Pain, Mild (1-3)


Apixaban (Eliquis)  5 mg PO BID [facility]; Protocol


   Last Admin: 03/08/19 08:48 Dose:  5 mg


Betamethasone Dipropionate (Diprolene)  0 applic TOP BID [facility]


   Last Admin: 03/08/19 08:50 Dose:  1 applic


Bisoprolol Fumarate (Zebeta)  5 mg PO DAILY [facility]


   Last Admin: 03/08/19 08:49 Dose:  5 mg


Diltiazem HCl (Cardizem Cd)  120 mg PO DAILY [facility]


   Last Admin: 03/08/19 08:48 Dose:  120 mg


Escitalopram Oxalate (Lexapro)  5 mg PO HS [facility]


Furosemide (Lasix)  40 mg PO DAILY [facility]


   Last Admin: 03/08/19 08:50 Dose:  40 mg


Ipratropium Bromide (Atrovent)  0.5 mg IH RQ8 [facility]


Isosorbide Mononitrate (Imdur Er)  30 mg PO DAILY [facility]


   Last Admin: 03/08/19 08:48 Dose:  30 mg


Levalbuterol HCl (Xopenex)  0.63 mg INH RQ8 [facility]


Lisinopril (Zestril)  20 mg PO DAILY [facility]


   Last Admin: 03/08/19 08:49 Dose:  20 mg


Meclizine HCl (Antivert)  12.5 mg PO DAILY PRN


   PRN Reason: vertigo











- Labs


Labs: 


                                        





                                 03/08/19 06:20 





                                 03/08/19 06:20

## 2019-03-08 NOTE — CP.PCM.PN
Subjective





- Date & Time of Evaluation


Date of Evaluation: 03/08/19 (\)


Time of Evaluation: 08:42





- Subjective


Subjective: 








Podiatry progress Note: Dr. Barrera





87 year old female patient seen and evaluated at the bedside for b/l lower 

extremity chronic stasis edema, dermatitis with possible cellulitis. Patient 

states that she has some pain in her legs. Patient states that she still  has 

rash in her arms fbut it's improved now. She states that yesterday she started 

to develop pain in her right ankle. She denies any trauma to her ankle. She 

denies any overnight nausea/vomiting/fever/chest pain/chills, or shortness of 

breath. 























Objective





- Vital Signs/Intake and Output


Vital Signs (last 24 hours): 


                                        











Temp Pulse Resp BP Pulse Ox


 


 97.6 F   71   20   121/68   97 


 


 03/08/19 08:24  03/08/19 08:24  03/08/19 08:24  03/08/19 08:24  03/08/19 08:24








Intake and Output: 


                                        











 03/08/19 03/08/19





 06:59 18:59


 


Intake Total 480 


 


Output Total 280 


 


Balance 200 














- Medications


Medications: 


                               Current Medications





Acetaminophen (Tylenol 325mg Tab)  650 mg PO Q6 PRN


   PRN Reason: Pain, moderate (4-7)


   Last Admin: 03/08/19 06:14 Dose:  650 mg


Acetaminophen (Tylenol 325mg Tab)  325 mg PO Q6 PRN


   PRN Reason: Pain, Mild (1-3)


Albuterol/Ipratropium (Duoneb 3 Mg/0.5 Mg (3 Ml) Ud)  3 ml INH RQ6 PRN


   PRN Reason: Shortness of Breath


Apixaban (Eliquis)  5 mg PO BID [facility]; Protocol


   Last Admin: 03/07/19 17:32 Dose:  5 mg


Betamethasone Dipropionate (Diprolene)  0 applic TOP BID [facility]


   Last Admin: 03/07/19 17:33 Dose:  1 applic


Bisoprolol Fumarate (Zebeta)  5 mg PO DAILY [facility]


   Last Admin: 03/07/19 11:39 Dose:  Not Given


Diltiazem HCl (Cardizem Cd)  120 mg PO DAILY [facility]


   Last Admin: 03/07/19 11:42 Dose:  120 mg


Furosemide (Lasix)  40 mg PO DAILY [facility]


   Last Admin: 03/07/19 11:34 Dose:  40 mg


Isosorbide Mononitrate (Imdur Er)  30 mg PO DAILY [facility]


   Last Admin: 03/07/19 11:39 Dose:  Not Given


Lisinopril (Zestril)  20 mg PO DAILY [facility]


   Last Admin: 03/07/19 11:39 Dose:  Not Given


Meclizine HCl (Antivert)  12.5 mg PO DAILY PRN


   PRN Reason: vertigo











- Labs


Labs: 


                                        





                                 03/08/19 06:20 





                                 03/08/19 06:20 











- Constitutional


Appears: Non-toxic, No Acute Distress





- Head Exam


Head Exam: ATRAUMATIC, NORMOCEPHALIC





- Extremities Exam


Additional comments: 








B/L lower extremities focused exam:





Vascular: DP/PT 1/4, Cap refill < 3 seconds, Temp gradient warm to warm, Mild 

pitting edema appreciated to bilateral lower extremities, Edema is improving.





Neuro: Gross and protective sensation intact.





Derm: B/L erythema to b/l lower extremities circumfrentially beginning at tibial

tuberosity extending down to digits, which decrease in erythema when elevated, 

Improved. No open lesions at this time, no weeping or drainage appreciated. No 

purulence, nor streaking appreciated. Elongated, dystrophic nails.





MSK: Pain with palpation of posterior aspect of b/l lower extremities, MMT 4/5 

in all groups b/l. Pain on palpating the right perimalleolar area. Pain with 

right ankle ROM


 


























- Neurological Exam


Neurological Exam: Alert, Awake





Assessment and Plan





- Assessment and Plan (Free Text)


Assessment: 








87 year old female patient seen and evaluated at the bedside with  

for b/l lower extremity chronic stasis edema, dermatitis with possible 

cellulitis. 

















Plan: 





Patient seen and evaluated 


Discussed patient plan in detail with Dr. Barrrea


Charts, labs and vitals reviewed: Afebrile, WBCs 14.6 , ESR > 105 (03/06)


Ammonium lactate 12% lotion and Ace bandage applied to lower extremities  no 

open wounds or weeping at this time 


Patient to continue elevating lower extremities.


LE US; negative for DVT 


Blood cultures taken; No growth after 5 days.


Continue IV abx as per primary team.


C/W pain management as needed. 


Ordered right ankle X-ray 3 views.


Continue wearing surgical shoes when ambulating.


Podiatry will continue to follow up the patient upon discharge.

## 2019-03-09 LAB
BUN SERPL-MCNC: 18 MG/DL (ref 7–17)
CALCIUM SERPL-MCNC: 8.5 MG/DL (ref 8.4–10.2)
ERYTHROCYTE [DISTWIDTH] IN BLOOD BY AUTOMATED COUNT: 15.4 % (ref 11.5–14.5)
GFR NON-AFRICAN AMERICAN: > 60
HGB BLD-MCNC: 10.3 G/DL (ref 12–16)
MCH RBC QN AUTO: 27.8 PG (ref 27–31)
MCHC RBC AUTO-ENTMCNC: 32.3 G/DL (ref 33–37)
MCV RBC AUTO: 86 FL (ref 81–99)
PLATELET # BLD: 359 K/UL (ref 130–400)
RBC # BLD AUTO: 3.7 MIL/UL (ref 3.8–5.2)
WBC # BLD AUTO: 11.8 K/UL (ref 4.8–10.8)

## 2019-03-09 RX ADMIN — LEVALBUTEROL SCH MG: 0.63 SOLUTION RESPIRATORY (INHALATION) at 07:11

## 2019-03-09 RX ADMIN — LEVALBUTEROL SCH MG: 0.63 SOLUTION RESPIRATORY (INHALATION) at 23:54

## 2019-03-09 RX ADMIN — LEVALBUTEROL SCH MG: 0.63 SOLUTION RESPIRATORY (INHALATION) at 00:31

## 2019-03-09 RX ADMIN — BETAMETHASONE DIPROPIONATE SCH APPLIC: 0.5 OINTMENT TOPICAL at 17:06

## 2019-03-09 RX ADMIN — IPRATROPIUM BROMIDE SCH MG: 0.5 SOLUTION RESPIRATORY (INHALATION) at 00:31

## 2019-03-09 RX ADMIN — IPRATROPIUM BROMIDE SCH MG: 0.5 SOLUTION RESPIRATORY (INHALATION) at 15:20

## 2019-03-09 RX ADMIN — BETAMETHASONE DIPROPIONATE SCH APPLIC: 0.5 OINTMENT TOPICAL at 09:51

## 2019-03-09 RX ADMIN — LEVALBUTEROL SCH MG: 0.63 SOLUTION RESPIRATORY (INHALATION) at 15:20

## 2019-03-09 RX ADMIN — IPRATROPIUM BROMIDE SCH MG: 0.5 SOLUTION RESPIRATORY (INHALATION) at 07:11

## 2019-03-09 RX ADMIN — IPRATROPIUM BROMIDE SCH MG: 0.5 SOLUTION RESPIRATORY (INHALATION) at 23:54

## 2019-03-09 RX ADMIN — DILTIAZEM HYDROCHLORIDE SCH MG: 120 CAPSULE, COATED, EXTENDED RELEASE ORAL at 09:49

## 2019-03-09 NOTE — RAD
Date of service: 



03/08/2019



HISTORY:

 Pain Right ankle 



COMPARISON:

None available.



FINDINGS:



BONES:

Normal. No fracture.



JOINTS:

Normal. No osteoarthritis.



SOFT TISSUE:

Normal.



OTHER FINDINGS:

Plantar heel spur.



IMPRESSION:

No fracture.

## 2019-03-09 NOTE — CP.PCM.HP
<Flo Thomas - Last Filed: 03/09/19 12:04>





History of Present Illness





- History of Present Illness


History of Present Illness: 





86 Y/O female with a PMH of COPD, CAD, CHF, AFib and chronic b/l chronic 

baseline leg dermatitis and swelling, who was admitted on 3/2/19 due to 

aggravating bilateral lower leg redness and swelling with diagnosis of bilateral

lower extremity stasis dermatitis with leg cellulitis. During her hospital 

course patient received treatment with IV antibiotics Vancomycin started on 

admission showing improvement of her b/l LE cellulitis on the second day after 

admission. Patient had b/l LE Doppler US done negative for DVT, WBC within range

on admission, blood cultures showed no growth after 48 hours. Patient was 

evaluated during her course by Podiatry and treated with Acewrap banding for her

chronic LE swelling and stasis dermatitis. As per podiatry patient can follow up

as outpatient.


On day 3 after admission patient was noted developing an erythematous rash on 

trunk and extremities of inconclusive cause. VAnco was discontinued.


Patient has remained afebrile during hospital course, today patient rash is 

unchanged, otherwise she denies SOB, CP, HA, or fever. Patient c/o some itching 

and nausea and benadryl IV, Zofran 4 IV, Hydrocosrtizone given on 3/6/19 with 

minimal improvement of rash, thn same day developed vertigo with nausea and 

vomiting most likely a reaction to benadryl used for itching, pt was treated 

with Meclizine and Zofran IV with resolution of her symptoms. Patient c/o 

inttermittent right ankle pain with motion, being evaluated by podiatry, ankle 

XR shows OA. Admitted to TCU to c/w physical therapy.





PMD: Dr Pyle


Allergies: Penicillins


Meds: Lasix 40mg PO daily, Crestor 20mg PO, Bisoprolol 5mg PO daily, Lisinopril 

20mg PO daily, Elliquis 5mg PO BID, Imdur ER 30mg PO daily, Cardizem 120mg PO 

daily. 


PMHx: COPD, CAD, CHF, paroxysmal AFib and chronic b/l leg dermatitis 


PSHx: Cardiac stent placement 2 years ago. 


SHx: Stopped tobacco 15 years ago, used to smoke 1.5 ppdfor >50 years. No 

alcohol and no rec drugs. 











Present on Admission





- Present on Admission


Any Indicators Present on Admission: No





Past Patient History





- Infectious Disease


Hx of Infectious Diseases: None





- Tetanus Immunizations


Tetanus Immunization: Unknown





- Past Medical History & Family History


Past Medical History?: Yes





- Past Social History


Smoking Status: Former Smoker





- CARDIAC


Hx Cardiac Disorders: Yes (A-fib)


Hx Congestive Heart Failure: Yes





- PULMONARY


Hx Chronic Obstructive Pulmonary Disease (COPD): Yes





- NEUROLOGICAL


Hx Neurological Disorder: Yes


Hx Syncope: Yes


Other/Comment: AMS on recent admission.





- HEENT


Hx HEENT Problems: No





- RENAL


Hx Chronic Kidney Disease: No





- ENDOCRINE/METABOLIC


Hx Endocrine Disorders: Yes


Other/Comment: Multinodular thyroid.





- HEMATOLOGICAL/ONCOLOGICAL


Hx Human Immunodeficiency Virus (HIV): No





- INTEGUMENTARY


Hx Dermatological Problems: Yes


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





- MUSCULOSKELETAL/RHEUMATOLOGICAL


Hx Falls: No


Hx Fractures: Yes (right ankle)





- GASTROINTESTINAL


Hx Gastrointestinal Disorders: No





- GENITOURINARY/GYNECOLOGICAL


Hx Genitourinary Disorders: Yes


Hx Incontinence: Yes


Other/Comment: Stress Incontinence





- PSYCHIATRIC


Hx Psychophysiologic Disorder: No


Hx Substance Use: No





- SURGICAL HISTORY


Hx Appendectomy: No


Hx Coronary Stent: Yes (x 2)





- ANESTHESIA


Hx Anesthesia: Yes


Hx Anesthesia Reactions: No


Hx Malignant Hyperthermia: No





Meds


Allergies/Adverse Reactions: 


                                    Allergies











Allergy/AdvReac Type Severity Reaction Status Date / Time


 


Penicillins Allergy Mild SWELLING Verified 03/08/19 15:40


 


vancomycin Allergy  RASH Verified 03/08/19 15:40














Physical Exam





- Constitutional


Appears: Non-toxic, No Acute Distress





- Head Exam


Head Exam: ATRAUMATIC, NORMOCEPHALIC





- Eye Exam


Eye Exam: EOMI


Pupil Exam: NORMAL ACCOMODATION, PERRL





- ENT Exam


ENT Exam: Mucous Membranes Moist





- Neck Exam


Neck exam: Positive for: Normal Inspection





- Respiratory Exam


Respiratory Exam: Clear to Auscultation Bilateral, NORMAL BREATHING PATTERN.  

absent: Chest Wall Tenderness, Prolonged Expiratory Phase, Rales, Rhonchi, 

Respiratory Distress





- Cardiovascular Exam


Cardiovascular Exam: REGULAR RHYTHM, RRR, +S1, +S2.  absent: Tachycardia, JVD, 

Systolic Murmur





- GI/Abdominal Exam


GI & Abdominal Exam: Normal Bowel Sounds, Soft.  absent: Tenderness





- Extremities Exam


Additional comments: 





ACE wraps intact, C/D/I





- Neurological Exam


Neurological exam: Alert, CN II-XII Intact, Oriented x3





- Skin


Skin Exam: Rash (diffuse erythmatous rash), Warm





Results





- Vital Signs


Recent Vital Signs: 





                                Last Vital Signs











Temp  97.7 F   03/09/19 10:08


 


Pulse  82   03/09/19 10:08


 


Resp  20   03/09/19 10:08


 


BP  156/69 H  03/09/19 10:08


 


Pulse Ox  90 L  03/09/19 10:08














- Labs


Result Diagrams: 


                                 03/09/19 04:30





                                 03/09/19 04:30


Labs: 





                         Laboratory Results - last 24 hr











  03/09/19 03/09/19





  04:30 04:30


 


WBC  11.8 H 


 


RBC  3.70 L 


 


Hgb  10.3 L 


 


Hct  31.8 L 


 


MCV  86.0 


 


MCH  27.8 


 


MCHC  32.3 L 


 


RDW  15.4 H 


 


Plt Count  359 


 


Sodium   141


 


Potassium   3.6


 


Chloride   98


 


Carbon Dioxide   37 H


 


Anion Gap   10


 


BUN   18 H


 


Creatinine   0.8


 


Est GFR ( Amer)   > 60


 


Est GFR (Non-Af Amer)   > 60


 


Random Glucose   144 H


 


Calcium   8.5














Assessment & Plan





- Assessment and Plan (Free Text)


Assessment: 





86 Y/O female with a PMH of COPD, CAD, CHF, AFib and chronic b/l chronic 

baseline leg dermatitis and swelling, who was admitted to TCU to continue 

physical therapy and reconditioning.





Plan:





1) Deconditioning


-as per PT/OT team


-will follow pt while she remains in TCU.





2). Bilateral  Lower extremity Stasis dermatitis with leg cellulitis


-chronic stasis dermatitis , aggravated by edema  improving, cellulitis 

diagnosis less likely at this time.


-Dopple venous US negative for DVT


-Pt was started on IV Vanco in ED, will discontinue Vanco due to patchy 

erythematous patchy rash likely due to Vanco use, also cellulitis less likely at

this time.


- Podiatry consulted, will f/u recomms  


- keep leg elevated


- Podiatry wrapped ACE bandage on both LE to decrease edema


-Vanco trough last 10.1 , stopped





3) Erythemaotous patchy rash


All meds reviewed, new meds started during hospitalization were stopped.


Likely secondary to use of new medications, Vanco stopped. actual cause 

inconclusive 


-Hydrocortisone 100 mg IV 


-Benadryl 50 IV given today 


-Betamethasone Dipropionate top BID


-Will f/u resolution





4) Chronic COPD


--On Oxygen by [facility] at home , cont 2 LPM


--Pulmonology consult, Dr Pyle, recomms appreciated 


- cont Duoneb 





5). Acute on chronic CHF exacerbation diastolic dysfunction- preserved EF


--Last echocardiogram on 02/04/19: LVEF 50-55%.


- cont PO Lasix, Lisinopril, Imdur and Bisoprolol





6). Paroxysmal Afib 


--Rate controlled


--Continue with Cardizem, Bisoprolol and Eliquis





7) DVT prophylaxis


--On Eliquis BID





8) Diet


--Heart healthy diet 





<Luann Gonzalez - Last Filed: 03/09/19 18:05>





Results





- Vital Signs


Recent Vital Signs: 





                                Last Vital Signs











Temp  97.8 F   03/09/19 17:25


 


Pulse  78   03/09/19 17:25


 


Resp  20   03/09/19 17:25


 


BP  103/55 L  03/09/19 17:25


 


Pulse Ox  93 L  03/09/19 17:25














- Labs


Result Diagrams: 


                                 03/09/19 04:30





                                 03/09/19 04:30


Labs: 





                         Laboratory Results - last 24 hr











  03/09/19 03/09/19





  04:30 04:30


 


WBC  11.8 H 


 


RBC  3.70 L 


 


Hgb  10.3 L 


 


Hct  31.8 L 


 


MCV  86.0 


 


MCH  27.8 


 


MCHC  32.3 L 


 


RDW  15.4 H 


 


Plt Count  359 


 


Sodium   141


 


Potassium   3.6


 


Chloride   98


 


Carbon Dioxide   37 H


 


Anion Gap   10


 


BUN   18 H


 


Creatinine   0.8


 


Est GFR ( Amer)   > 60


 


Est GFR (Non-Af Amer)   > 60


 


Random Glucose   144 H


 


Calcium   8.5














Attending/Attestation





- Attestation


I have personally seen and examined this patient.: Yes


I have fully participated in the care of the patient.: Yes


I have reviewed all pertinent clinical information: Yes


Notes (Text): 





03/09/19 18:04


agree with findings and plan as above

## 2019-03-09 NOTE — CP.PCM.CON
History of Present Illness





- History of Present Illness


History of Present Illness: 





Seen on follow up consultation from acute medicine. Had been admitted with 

increased SOB, fatigue and swelling of the lower extremities. Found to have 

markedly edematous LEs with intense erythema and treated for cellulitis.There 

was an apparent dermatological adverse effect from the vancomycin given to her 

because of penicillin allergy. 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 

syndrome' and this rx was discontinued. She gradually improved, as did her COPD 

exacerbation and she was discharged to TCU for physical therapy and continued 

medical therapies.





Past Patient History





- Infectious Disease


Hx of Infectious Diseases: None





- Tetanus Immunizations


Tetanus Immunization: Unknown





- Past Medical History & Family History


Past Medical History?: Yes





- Past Social History


Smoking Status: Former Smoker


Chewing Tobacco Use: No


Cigar Use: No


Alcohol: Social


Drugs: Denies


Home Situation {Lives}: Alone





- CARDIAC


Hx Atrial Fibrillation: Yes


Hx Congestive Heart Failure: Yes


Hx Hypercholesterolemia: Yes


Hx Hypertension: Yes


Hx Pacemaker: No


Hx Peripheral Edema: Yes





- PULMONARY


Hx Chronic Obstructive Pulmonary Disease (COPD): Yes


Hx Emphysema: Yes


Hx Pneumonia: Yes





- NEUROLOGICAL


Hx Neurological Disorder: Yes


Hx Syncope: Yes


Other/Comment: AMS on recent admission.





- HEENT


Hx HEENT Problems: No





- RENAL


Hx Chronic Kidney Disease: No





- ENDOCRINE/METABOLIC


Hx Endocrine Disorders: Yes


Other/Comment: Multinodular thyroid.





- HEMATOLOGICAL/ONCOLOGICAL


Hx Anemia: Yes


Hx Human Immunodeficiency Virus (HIV): No





- INTEGUMENTARY


Hx Dermatological Problems: Yes


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





- MUSCULOSKELETAL/RHEUMATOLOGICAL


Hx Falls: No


Hx Fractures: Yes (right ankle)





- GASTROINTESTINAL


Hx Gastrointestinal Disorders: No





- GENITOURINARY/GYNECOLOGICAL


Hx Incontinence: Yes


Other/Comment: Stress Incontinence





- PSYCHIATRIC


Hx Psychophysiologic Disorder: Yes


Hx Substance Use: No


Other/Comment: Depressed mood





- SURGICAL HISTORY


Hx Appendectomy: No


Hx Coronary Stent: Yes (x 2)





- ANESTHESIA


Hx Anesthesia: Yes


Hx Anesthesia Reactions: No


Hx Malignant Hyperthermia: No





Meds


Allergies/Adverse Reactions: 


                                    Allergies











Allergy/AdvReac Type Severity Reaction Status Date / Time


 


Penicillins Allergy Mild SWELLING Verified 03/08/19 15:40


 


vancomycin Allergy  RASH Verified 03/08/19 15:40














- Medications


Medications: 


                               Current Medications





Acetaminophen (Tylenol 325mg Tab)  325 mg PO Q6 PRN


   PRN Reason: Pain, Mild (1-3)


   Last Admin: 03/08/19 20:04 Dose:  325 mg


Albuterol/Ipratropium (Duoneb 3 Mg/0.5 Mg (3 Ml) Ud)  3 ml INH RQ6 PRN


   PRN Reason: Shortness of Breath


Apixaban (Eliquis)  5 mg PO BID [facility]; Protocol


   Last Admin: 03/09/19 09:50 Dose:  5 mg


Atorvastatin Calcium (Lipitor)  40 mg PO [facility]


Betamethasone Dipropionate (Diprolene)  1 applic TOP BID [facility]


   Last Admin: 03/09/19 09:51 Dose:  1 applic


Bisoprolol Fumarate (Zebeta)  5 mg PO DAILY [facility]


   Last Admin: 03/09/19 09:50 Dose:  5 mg


Diltiazem HCl (Cardizem Cd)  120 mg PO DAILY [facility]


   Last Admin: 03/09/19 09:49 Dose:  120 mg


Escitalopram Oxalate (Lexapro)  5 mg PO HS [facility]


   Last Admin: 03/08/19 21:40 Dose:  5 mg


Hydrocortisone Sodium Succinate (Solu-Cortef)  50 mg IV Q12 [facility]


   Last Admin: 03/09/19 09:48 Dose:  50 mg


Ipratropium Bromide (Atrovent)  0.5 mg IH RQ8 [facility]


   Last Admin: 03/09/19 07:11 Dose:  0.5 mg


Isosorbide Mononitrate (Imdur Er)  30 mg PO DAILY [facility]


   Last Admin: 03/09/19 09:50 Dose:  30 mg


Levalbuterol HCl (Xopenex)  0.63 mg INH RQ8 [facility]


   Last Admin: 03/09/19 07:11 Dose:  0.63 mg


Lisinopril (Zestril)  20 mg PO DAILY [facility]


   Last Admin: 03/09/19 10:01 Dose:  20 mg


Meclizine HCl (Antivert)  12.5 mg PO DAILY PRN


   PRN Reason: vertigo


   Last Admin: 03/09/19 09:50 Dose:  12.5 mg











Physical Exam





- Additional Findings


Additional findings: 





Overweight female seated in bedside chair in NAD.


Dependant edema both LE's +, acewrap bandages both LEs from ankle to knee.


No cyanosis, resolving, but still faintly present generalized erythematous skin 

eruption.


Neck is supple and trachea midline. No visible JVD or carotid bruit.


Pharynx pink and moist, nares patent bilaterally.


No dullness on chest percussion.


Breath sounds are diminished bilaterally w/o audible wheezes.


No bronchial breath sounds or egophony. Few scattered dry rales


Heart sounds are distant, rhythm regular with PHBs.


Abdomen soft, fleshy, non-tender with normal BS.





Results





- Vital Signs


Recent Vital Signs: 


                                Last Vital Signs











Temp  97.7 F   03/09/19 10:08


 


Pulse  82   03/09/19 10:08


 


Resp  20   03/09/19 10:08


 


BP  156/69 H  03/09/19 10:08


 


Pulse Ox  90 L  03/09/19 10:08














- Labs


Result Diagrams: 


                                 03/09/19 04:30





                                 03/09/19 04:30


Labs: 


                         Laboratory Results - last 24 hr











  03/09/19 03/09/19





  04:30 04:30


 


WBC  11.8 H 


 


RBC  3.70 L 


 


Hgb  10.3 L 


 


Hct  31.8 L 


 


MCV  86.0 


 


MCH  27.8 


 


MCHC  32.3 L 


 


RDW  15.4 H 


 


Plt Count  359 


 


Sodium   141


 


Potassium   3.6


 


Chloride   98


 


Carbon Dioxide   37 H


 


Anion Gap   10


 


BUN   18 H


 


Creatinine   0.8


 


Est GFR ( Amer)   > 60


 


Est GFR (Non-Af Amer)   > 60


 


Random Glucose   144 H


 


Calcium   8.5














Assessment & Plan


(1) Rash and nonspecific skin eruption


Status: Acute   Priority: High   





(2) COPD (chronic obstructive pulmonary disease)


Status: Chronic   Priority: High   





(3) Hypoxemia requiring supplemental oxygen


Status: Chronic   Priority: High   





(4) Physical deconditioning


Status: Chronic   Priority: High   





- Assessment and Plan (Free Text)


Plan: 





Aerosol therapy with levalbuterol and ipratropium Q8H.


Reducing doses of supplemental corticosteroids.


Initiate low dose chlorthalidone.


Supplemental oxygen as needed to maintain SpO2 88-94%.





- Date & Time


Date: 03/09/19


Time: 11:52

## 2019-03-10 RX ADMIN — BETAMETHASONE DIPROPIONATE SCH APPLIC: 0.5 OINTMENT TOPICAL at 16:48

## 2019-03-10 RX ADMIN — BETAMETHASONE DIPROPIONATE SCH APPLIC: 0.5 OINTMENT TOPICAL at 09:11

## 2019-03-10 RX ADMIN — IPRATROPIUM BROMIDE SCH MG: 0.5 SOLUTION RESPIRATORY (INHALATION) at 15:18

## 2019-03-10 RX ADMIN — LEVALBUTEROL SCH MG: 0.63 SOLUTION RESPIRATORY (INHALATION) at 07:20

## 2019-03-10 RX ADMIN — DILTIAZEM HYDROCHLORIDE SCH MG: 120 CAPSULE, COATED, EXTENDED RELEASE ORAL at 09:08

## 2019-03-10 RX ADMIN — LEVALBUTEROL SCH MG: 0.63 SOLUTION RESPIRATORY (INHALATION) at 15:18

## 2019-03-10 RX ADMIN — IPRATROPIUM BROMIDE SCH MG: 0.5 SOLUTION RESPIRATORY (INHALATION) at 07:20

## 2019-03-10 NOTE — CP.PCM.PN
Subjective





- Date & Time of Evaluation


Date of Evaluation: 03/10/19


Time of Evaluation: 11:31





- Subjective


Subjective: 





Podiatry progress Note - Dr. Barrera





87F seen and evaluated at the bedside for b/l lower extremity chronic stasis 

edema, dermatitis with possible cellulitis. Patient states that she has some 

pain in her legs. States her legs feel dry and are scaling. She denies any 

overnight nausea/vomiting/fever/chest pain/chills, or shortness of breath. 








Objective





- Vital Signs/Intake and Output


Vital Signs (last 24 hours): 


                                        











Temp Pulse Resp BP Pulse Ox


 


 97.3 F L  70   20   145/66   96 


 


 03/10/19 08:31  03/10/19 09:09  03/10/19 08:31  03/10/19 09:09  03/10/19 08:31











- Medications


Medications: 


                               Current Medications





Acetaminophen (Tylenol 325mg Tab)  325 mg PO Q6 PRN


   PRN Reason: Pain, Mild (1-3)


   Last Admin: 03/08/19 20:04 Dose:  325 mg


Albuterol/Ipratropium (Duoneb 3 Mg/0.5 Mg (3 Ml) Ud)  3 ml INH RQ6 PRN


   PRN Reason: Shortness of Breath


Apixaban (Eliquis)  5 mg PO BID [facility]; Protocol


   Last Admin: 03/10/19 09:08 Dose:  5 mg


Atorvastatin Calcium (Lipitor)  40 mg PO HS [facility]


   Last Admin: 03/09/19 21:26 Dose:  40 mg


Betamethasone Dipropionate (Diprolene)  1 applic TOP BID [facility]


   Last Admin: 03/10/19 09:11 Dose:  1 applic


Bisoprolol Fumarate (Zebeta)  5 mg PO DAILY [facility]


   Last Admin: 03/10/19 09:09 Dose:  5 mg


Chlorthalidone (Hygroton)  25 mg PO DAILY [facility]


   Last Admin: 03/10/19 09:09 Dose:  25 mg


Diltiazem HCl (Cardizem Cd)  120 mg PO DAILY [facility]


   Last Admin: 03/10/19 09:08 Dose:  120 mg


Escitalopram Oxalate (Lexapro)  5 mg PO HS [facility]


   Last Admin: 03/09/19 21:26 Dose:  5 mg


Hydrocortisone Sodium Succinate (Solu-Cortef)  50 mg IV DAILY [facility]


   Last Admin: 03/10/19 09:10 Dose:  50 mg


Ipratropium Bromide (Atrovent)  0.5 mg IH RQ8 [facility]


   Last Admin: 03/10/19 07:20 Dose:  0.5 mg


Isosorbide Mononitrate (Imdur Er)  30 mg PO DAILY [facility]


   Last Admin: 03/10/19 09:08 Dose:  30 mg


Levalbuterol HCl (Xopenex)  0.63 mg INH RQ8 [facility]


   Last Admin: 03/10/19 07:20 Dose:  0.63 mg


Lisinopril (Zestril)  20 mg PO DAILY [facility]


   Last Admin: 03/10/19 09:09 Dose:  20 mg


Meclizine HCl (Antivert)  12.5 mg PO DAILY PRN


   PRN Reason: vertigo


   Last Admin: 03/09/19 09:50 Dose:  12.5 mg











- Labs


Labs: 


                                        





                                 03/09/19 04:30 





                                 03/09/19 04:30 











- Constitutional


Appears: Non-toxic





- Head Exam


Head Exam: ATRAUMATIC





- Extremities Exam


Additional comments: 





B/L lower extremities focused exam:





Vascular: DP/PT 1/4, Cap refill < 3 seconds, Temp gradient warm to warm, Mild 

pitting edema appreciated to bilateral lower extremities, Edema is improving.





Neuro: Gross and protective sensation intact.





Derm: B/L erythema to b/l lower extremities circumfrentially beginning at tibial

tuberosity extending down to digits, which decrease in erythema when elevated, 

Improved. No open lesions at this time, no weeping or drainage appreciated. No 

purulence, nor streaking appreciated. Elongated, dystrophic nails.





MSK: Pain with palpation of posterior aspect of b/l lower extremities, MMT 4/5 

in all groups b/l. Pain on palpating the right perimalleolar area. Pain with 

right ankle ROM





- Neurological Exam


Neurological Exam: Alert, Awake, Oriented x3





- Psychiatric Exam


Psychiatric exam: Normal Affect, Normal Mood





Assessment and Plan





- Assessment and Plan (Free Text)


Assessment: 





87F with b/l lower extremity chronic stasis edema, dermatitis with possible 

cellulitis.


Plan: 





Patient seen and evaluated 


Discussed patient plan in detail with Dr. Barrera


Charts, labs and vitals reviewed: Afebrile, WBCs 11.8 , ESR > 105 (03/06)


Ammonium lactate 12% lotion and Ace bandage applied to lower extremities  no 

open wounds or weeping at this time 


Patient to continue elevating lower extremities.


LE US; negative for DVT 


Blood cultures taken; No growth after 5 days.


Continue IV abx as per primary team.


C/W pain management as needed. 


Right ankle x-ray - no fractures


Continue wearing surgical shoes when ambulating.


Podiatry will continue to follow up the patient upon discharge.

## 2019-03-11 LAB
BUN SERPL-MCNC: 16 MG/DL (ref 7–17)
CALCIUM SERPL-MCNC: 8.9 MG/DL (ref 8.4–10.2)
GFR NON-AFRICAN AMERICAN: > 60

## 2019-03-11 RX ADMIN — IPRATROPIUM BROMIDE SCH MG: 0.5 SOLUTION RESPIRATORY (INHALATION) at 07:27

## 2019-03-11 RX ADMIN — BETAMETHASONE DIPROPIONATE SCH APPLIC: 0.5 OINTMENT TOPICAL at 10:00

## 2019-03-11 RX ADMIN — LEVALBUTEROL SCH MG: 0.63 SOLUTION RESPIRATORY (INHALATION) at 07:26

## 2019-03-11 RX ADMIN — DILTIAZEM HYDROCHLORIDE SCH MG: 120 CAPSULE, COATED, EXTENDED RELEASE ORAL at 08:19

## 2019-03-11 RX ADMIN — LEVALBUTEROL SCH MG: 0.63 SOLUTION RESPIRATORY (INHALATION) at 00:09

## 2019-03-11 RX ADMIN — IPRATROPIUM BROMIDE SCH MG: 0.5 SOLUTION RESPIRATORY (INHALATION) at 00:10

## 2019-03-11 RX ADMIN — LEVALBUTEROL SCH MG: 0.63 SOLUTION RESPIRATORY (INHALATION) at 15:29

## 2019-03-11 RX ADMIN — IPRATROPIUM BROMIDE SCH MG: 0.5 SOLUTION RESPIRATORY (INHALATION) at 15:29

## 2019-03-11 RX ADMIN — BETAMETHASONE DIPROPIONATE SCH APPLIC: 0.5 OINTMENT TOPICAL at 16:36

## 2019-03-11 NOTE — CP.PCM.PN
Subjective





- Date & Time of Evaluation


Date of Evaluation: 03/11/19


Time of Evaluation: 11:43





- Subjective


Subjective: 





Appears to be doing relatively well on the current regimen.


Will repeat BMP to check TCO2 levels.


If persistent elevated level is encountered, will try using lower dose 

acetazolamide 250 BID.


Follow up chest x-ray as well in AM.





Objective





- Vital Signs/Intake and Output


Vital Signs (last 24 hours): 


                                        











Temp Pulse Resp BP Pulse Ox


 


 98.5 F   67   18   138/68   94 L


 


 03/11/19 07:45  03/11/19 08:20  03/11/19 07:45  03/11/19 08:20  03/11/19 07:45











- Medications


Medications: 


                               Current Medications





Acetaminophen (Tylenol 325mg Tab)  325 mg PO Q6 PRN


   PRN Reason: Pain, Mild (1-3)


   Last Admin: 03/08/19 20:04 Dose:  325 mg


Albuterol/Ipratropium (Duoneb 3 Mg/0.5 Mg (3 Ml) Ud)  3 ml INH RQ6 PRN


   PRN Reason: Shortness of Breath


Apixaban (Eliquis)  5 mg PO BID [facility]; Protocol


   Last Admin: 03/11/19 08:19 Dose:  5 mg


Atorvastatin Calcium (Lipitor)  40 mg PO HS [facility]


   Last Admin: 03/10/19 21:06 Dose:  40 mg


Betamethasone Dipropionate (Diprolene)  1 applic TOP BID [facility]


   Last Admin: 03/10/19 16:48 Dose:  1 applic


Bisoprolol Fumarate (Zebeta)  5 mg PO DAILY [facility]


   Last Admin: 03/11/19 08:21 Dose:  5 mg


Chlorthalidone (Hygroton)  25 mg PO DAILY [facility]


   Last Admin: 03/11/19 08:19 Dose:  25 mg


Diltiazem HCl (Cardizem Cd)  120 mg PO DAILY [facility]


   Last Admin: 03/11/19 08:19 Dose:  120 mg


Escitalopram Oxalate (Lexapro)  5 mg PO HS [facility]


   Last Admin: 03/10/19 21:06 Dose:  5 mg


Hydrocortisone Sodium Succinate (Solu-Cortef)  50 mg IV DAILY [facility]


   Last Admin: 03/11/19 08:21 Dose:  50 mg


Ipratropium Bromide (Atrovent)  0.5 mg IH RQ8 [facility]


   Last Admin: 03/11/19 07:27 Dose:  0.5 mg


Isosorbide Mononitrate (Imdur Er)  30 mg PO DAILY [facility]


   Last Admin: 03/11/19 08:20 Dose:  30 mg


Levalbuterol HCl (Xopenex)  0.63 mg INH RQ8 [facility]


   Last Admin: 03/11/19 07:26 Dose:  0.63 mg


Lisinopril (Zestril)  20 mg PO DAILY [facility]


   Last Admin: 03/11/19 08:20 Dose:  20 mg


Meclizine HCl (Antivert)  12.5 mg PO DAILY PRN


   PRN Reason: vertigo


   Last Admin: 03/09/19 09:50 Dose:  12.5 mg











- Labs


Labs: 


                                        





                                 03/09/19 04:30 





                                 03/11/19 06:00 











Assessment and Plan


(1) Rash and nonspecific skin eruption


Status: Acute   





(2) COPD (chronic obstructive pulmonary disease)


Status: Chronic   





(3) Hypoxemia requiring supplemental oxygen


Status: Chronic   





(4) Physical deconditioning


Status: Chronic

## 2019-03-12 RX ADMIN — LEVALBUTEROL SCH MG: 0.63 SOLUTION RESPIRATORY (INHALATION) at 16:03

## 2019-03-12 RX ADMIN — DILTIAZEM HYDROCHLORIDE SCH MG: 120 CAPSULE, COATED, EXTENDED RELEASE ORAL at 08:53

## 2019-03-12 RX ADMIN — IPRATROPIUM BROMIDE SCH MG: 0.5 SOLUTION RESPIRATORY (INHALATION) at 16:03

## 2019-03-12 RX ADMIN — LEVALBUTEROL SCH: 0.63 SOLUTION RESPIRATORY (INHALATION) at 23:54

## 2019-03-12 RX ADMIN — LEVALBUTEROL SCH: 0.63 SOLUTION RESPIRATORY (INHALATION) at 00:27

## 2019-03-12 RX ADMIN — IPRATROPIUM BROMIDE SCH MG: 0.5 SOLUTION RESPIRATORY (INHALATION) at 07:27

## 2019-03-12 RX ADMIN — IPRATROPIUM BROMIDE SCH: 0.5 SOLUTION RESPIRATORY (INHALATION) at 23:53

## 2019-03-12 RX ADMIN — IPRATROPIUM BROMIDE SCH: 0.5 SOLUTION RESPIRATORY (INHALATION) at 00:27

## 2019-03-12 RX ADMIN — POTASSIUM CHLORIDE SCH MEQ: 10 TABLET, FILM COATED, EXTENDED RELEASE ORAL at 13:33

## 2019-03-12 NOTE — CP.PCM.PN
Subjective





- Date & Time of Evaluation


Date of Evaluation: 03/12/19


Time of Evaluation: 09:41





- Subjective


Subjective: 





Podiatry progress Note: Dr. Barrera





87 year old female patient seen and evaluated at the bedside in TCU for b/l 

lower extremity chronic stasis edema, dermatitis with possible cellulitis. 

Patient states that she doesn't have any pain in her legs now. Patient states 

that her rash almost subsided down now. She denies any overnight naus

ea/vomiting/fever/chest pain/chills, or shortness of breath. 
































Objective





- Vital Signs/Intake and Output


Vital Signs (last 24 hours): 


                                        











Temp Pulse Resp BP Pulse Ox


 


 98.2 F   67   18   156/65 H  94 L


 


 03/12/19 08:18  03/12/19 08:53  03/12/19 08:18  03/12/19 08:53  03/12/19 08:18











- Medications


Medications: 


                               Current Medications





Acetaminophen (Tylenol 325mg Tab)  325 mg PO Q6 PRN


   PRN Reason: Pain, Mild (1-3)


   Last Admin: 03/08/19 20:04 Dose:  325 mg


Albuterol/Ipratropium (Duoneb 3 Mg/0.5 Mg (3 Ml) Ud)  3 ml INH RQ6 PRN


   PRN Reason: Shortness of Breath


Apixaban (Eliquis)  [facility] BID [facility]


   Last Admin: 03/12/19 08:52 Dose:  5 mg


Atorvastatin Calcium (Lipitor)  40 mg PO HS [facility]


   Last Admin: 03/11/19 21:50 Dose:  40 mg


Betamethasone Dipropionate (Diprolene)  1 applic TOP BID [facility]


   Last Admin: 03/11/19 16:36 Dose:  1 applic


Bisoprolol Fumarate (Zebeta)  5 mg PO DAILY [facility]


   Last Admin: 03/12/19 08:52 Dose:  5 mg


Chlorthalidone (Hygroton)  25 mg PO DAILY [facility]


   Last Admin: 03/12/19 09:02 Dose:  25 mg


Diltiazem HCl (Cardizem Cd)  120 mg PO DAILY [facility]


   Last Admin: 03/12/19 08:53 Dose:  120 mg


Escitalopram Oxalate (Lexapro)  5 mg PO HS [facility]


   Last Admin: 03/11/19 21:50 Dose:  5 mg


Hydrocortisone (Cortef)  20 mg PO DAILY [facility]


   Last Admin: 03/12/19 08:52 Dose:  20 mg


Ipratropium Bromide (Atrovent)  0.5 mg IH RQ8 [facility]


   Last Admin: 03/12/19 07:27 Dose:  0.5 mg


Isosorbide Mononitrate (Imdur Er)  30 mg PO DAILY [facility]


   Last Admin: 03/12/19 08:53 Dose:  30 mg


Levalbuterol HCl (Xopenex)  0.63 mg INH RQ8 [facility]


   Last Admin: 03/12/19 00:27 Dose:  Not Given


Lisinopril (Zestril)  20 mg PO DAILY [facility]


   Last Admin: 03/12/19 08:52 Dose:  20 mg


Meclizine HCl (Antivert)  12.5 mg PO DAILY PRN


   PRN Reason: vertigo


   Last Admin: 03/09/19 09:50 Dose:  12.5 mg











- Labs


Labs: 


                                        





                                 03/09/19 04:30 





                                 03/12/19 05:30 











- Constitutional


Appears: Well, Non-toxic, No Acute Distress





- Head Exam


Head Exam: ATRAUMATIC, NORMOCEPHALIC





- Extremities Exam


Additional comments: 





B/L lower extremities focused exam:





Vascular: DP/PT 1/4, Cap refill < 3 seconds, Temp gradient warm to warm, No 

edema appreciated to bilateral lower extremities.





Neuro: Gross and protective sensation intact.





Derm: B/L mild erythema to b/l lower extremities circumfrentially beginning at 

tibial tuberosity extending down to digits, which decrease in erythema when 

elevated, Improved. No open lesions at this time, no weeping or drainage 

appreciated. No purulence, nor streaking appreciated. Dystrophic nails.





MSK: No Pain with palpation of b/l lower extremities, MMT 4/5 in all groups b/l.




 
































- Neurological Exam


Neurological Exam: Alert, Awake





Assessment and Plan





- Assessment and Plan (Free Text)


Assessment: 








87 year old female patient seen and evaluated at the bedside with  

for b/l lower extremity chronic stasis edema, dermatitis with possible 

cellulitis. 














Plan: 








Patient seen and evaluated 


Discussed patient plan in detail with Dr. Barrera


Charts, labs and vitals reviewed: Afebrile, WBCs 11.8 (3/9)


Ammonium lactate 12% lotion and Ace bandage applied to lower extremities  no 

open wounds or weeping at this time 


Patient to continue elevating lower extremities.


LE US; negative for DVT 


Blood cultures taken; No growth after 5 days.


Continue IV abx as per primary team.


C/W pain management as needed. 


Right ankle X-ray 3 views; No acute fracture noted.


Continue wearing surgical shoes when ambulating.


Podiatry will continue to follow up the patient upon discharge.

## 2019-03-12 NOTE — RAD
Date of service: 



03/12/2019



HISTORY:

Shortness of breath



COMPARISON:

03/04/2019.



TECHNIQUE:

Chest PA and lateral



FINDINGS:



LUNGS:

Moderate hyperinflation. No suspicious pulmonary nodules, masses or 

infiltrates.



PLEURA:

No significant pleural effusion identified. No pneumothorax apparent.



CARDIOVASCULAR:

Atherosclerotic calcifications identified primarily aortic arch.



Cardiomegaly.  No evidence of acute, significant cardiovascular 

disease. 



 No pulmonary vascular congestion. 



OSSEOUS STRUCTURES:

No significant abnormalities.



VISUALIZED UPPER ABDOMEN:

Normal.



OTHER FINDINGS:

None.



IMPRESSION:

No active disease. No significant interval change compared to the 

prior examination(s).

## 2019-03-12 NOTE — CP.PCM.PN
Subjective





- Date & Time of Evaluation


Date of Evaluation: 03/12/19


Time of Evaluation: 15:00





- Subjective


Subjective: 





Patient seen and examined. Claimed she was feeling better. Both legs less 

inflammed. 





Objective





- Vital Signs/Intake and Output


Vital Signs (last 24 hours): 


                                        











Temp Pulse Resp BP Pulse Ox


 


 97.2 F L  59 L  20   120/71   94 L


 


 03/12/19 16:23  03/12/19 16:23  03/12/19 16:23  03/12/19 16:23  03/12/19 16:23











- Medications


Medications: 


                               Current Medications





Acetaminophen (Tylenol 325mg Tab)  325 mg PO Q6 PRN


   PRN Reason: Pain, Mild (1-3)


   Last Admin: 03/08/19 20:04 Dose:  325 mg


Acetazolamide (Diamox 250 Mg Tab)  250 mg PO BID [facility]


   Last Admin: 03/12/19 16:31 Dose:  250 mg


Albuterol/Ipratropium (Duoneb 3 Mg/0.5 Mg (3 Ml) Ud)  3 ml INH RQ6 PRN


   PRN Reason: Shortness of Breath


Apixaban (Eliquis)  5 mg PO BID [facility]; Protocol


   Last Admin: 03/12/19 16:31 Dose:  5 mg


Atorvastatin Calcium (Lipitor)  40 mg PO HS [facility]


   Last Admin: 03/11/19 21:50 Dose:  40 mg


Betamethasone Valerate (Valisone)  1 applic TP DAILY [facility]


Bisoprolol Fumarate (Zebeta)  5 mg PO DAILY [facility]


   Last Admin: 03/12/19 08:52 Dose:  5 mg


Chlorthalidone (Hygroton)  25 mg PO DAILY [facility]


   Last Admin: 03/12/19 09:02 Dose:  25 mg


Diltiazem HCl (Cardizem Cd)  120 mg PO DAILY [facility]


   Last Admin: 03/12/19 08:53 Dose:  120 mg


Escitalopram Oxalate (Lexapro)  5 mg PO HS [facility]


   Last Admin: 03/11/19 21:50 Dose:  5 mg


Hydrocortisone (Cortef)  20 mg PO DAILY [facility]


   Last Admin: 03/12/19 08:52 Dose:  20 mg


Ipratropium Bromide (Atrovent)  0.5 mg IH RQ8 [facility]


   Last Admin: 03/12/19 16:03 Dose:  0.5 mg


Isosorbide Mononitrate (Imdur Er)  30 mg PO DAILY [facility]


   Last Admin: 03/12/19 08:53 Dose:  30 mg


Levalbuterol HCl (Xopenex)  0.63 mg INH RQ8 [facility]


   Last Admin: 03/12/19 16:03 Dose:  0.63 mg


Lisinopril (Zestril)  20 mg PO DAILY [facility]


   Last Admin: 03/12/19 08:52 Dose:  20 mg


Meclizine HCl (Antivert)  12.5 mg PO DAILY PRN


   PRN Reason: vertigo


   Last Admin: 03/09/19 09:50 Dose:  12.5 mg


Potassium Chloride (Klor-Con 10)  10 meq PO DAILY [facility]


   Last Admin: 03/12/19 13:33 Dose:  10 meq











- Labs


Labs: 


                                        





                                 03/09/19 04:30 





                                 03/12/19 05:30 











- Constitutional


Appears: No Acute Distress





- Head Exam


Head Exam: ATRAUMATIC





- Eye Exam


Eye Exam: absent: Scleral icterus





- ENT Exam


ENT Exam: Mucous Membranes Moist





- Neck Exam


Neck Exam: absent: Meningismus





- Respiratory Exam


Respiratory Exam: absent: Rales, Rhonchi, Wheezes, Respiratory Distress





- Cardiovascular Exam


Cardiovascular Exam: REGULAR RHYTHM, +S1, +S2





- GI/Abdominal Exam


GI & Abdominal Exam: Soft.  absent: Tenderness





- Rectal Exam


Rectal Exam: Deferred





- Extremities Exam


Extremities Exam: Pedal Edema





- Neurological Exam


Neurological Exam: Alert, Oriented x3





- Psychiatric Exam


Psychiatric exam: Normal Affect





- Skin


Skin Exam: Dry, Intact





Assessment and Plan





- Assessment and Plan (Free Text)


Assessment: 





88 yo female with history of COPD, CAD, CHF, AFib and chronic stasis dermatitis 

of both legs admitted to TCU for therapy because of physical deconditioning.








1. Physical Deconditioning


continue PT/OT 








2. Stasis dermatitis 


Podiatry on consult








3. COPD


stable 


continue Darrynb 


Dr Pyle on consult








4. CHF exacerbation 


Diastolic Dysfunction


Last echo on 02/04/19 with LVEF of 50-55%.


continue PO Lasix, Lisinopril, Imdur and Bisoprolol








5. Paroxysmal Afib 


Rate controlled


Continue Cardizem, Bisoprolol and Eliquis

## 2019-03-13 RX ADMIN — IPRATROPIUM BROMIDE SCH MG: 0.5 SOLUTION RESPIRATORY (INHALATION) at 15:14

## 2019-03-13 RX ADMIN — LEVALBUTEROL SCH MG: 0.63 SOLUTION RESPIRATORY (INHALATION) at 15:14

## 2019-03-13 RX ADMIN — LEVALBUTEROL SCH: 0.63 SOLUTION RESPIRATORY (INHALATION) at 23:18

## 2019-03-13 RX ADMIN — IPRATROPIUM BROMIDE SCH MG: 0.5 SOLUTION RESPIRATORY (INHALATION) at 07:28

## 2019-03-13 RX ADMIN — POTASSIUM CHLORIDE SCH MEQ: 10 TABLET, FILM COATED, EXTENDED RELEASE ORAL at 08:45

## 2019-03-13 RX ADMIN — DILTIAZEM HYDROCHLORIDE SCH MG: 120 CAPSULE, COATED, EXTENDED RELEASE ORAL at 08:45

## 2019-03-13 RX ADMIN — IPRATROPIUM BROMIDE SCH: 0.5 SOLUTION RESPIRATORY (INHALATION) at 23:18

## 2019-03-13 RX ADMIN — LEVALBUTEROL SCH MG: 0.63 SOLUTION RESPIRATORY (INHALATION) at 07:28

## 2019-03-13 NOTE — CP.PCM.PN
Subjective





- Date & Time of Evaluation


Date of Evaluation: 03/13/19


Time of Evaluation: 11:31





- Subjective


Subjective: 





Has done very well on the current regimen.


Seated in a bedside chair doing PT.


Claims to feel much improved.


Vital signs have been stable and she remains afebrile.





Dependant edema has improved markedly. 


Skin is dry and flaking, but most of the erythema has resolved.


There is residual hyperpigmentation of the ankles bilaterally.


No dullness on chest percussion.


Breath sounds are diminished equally in both lungs.


Dry rales are heard in the lower lobes of both lungs.


No audible wheezes or bronchial breath sounds.


Heart sounds are distant, rhythm appears regular.





Likely able to discharge home tomorrow.


Will taper oral hydrocortisone as outpatient.


Repeat BMP in AM to check BUN/creat.


All current maintenance medications will continue.


[facility] services and home PT suggested.





Objective





- Vital Signs/Intake and Output


Vital Signs (last 24 hours): 


                                        











Temp Pulse Resp BP Pulse Ox


 


 97.6 F   60   18   126/65   95 


 


 03/13/19 08:03  03/13/19 08:45  03/13/19 08:03  03/13/19 08:45  03/13/19 08:03











- Medications


Medications: 


                               Current Medications





Acetaminophen (Tylenol 325mg Tab)  325 mg PO Q6 PRN


   PRN Reason: Pain, Mild (1-3)


   Last Admin: 03/08/19 20:04 Dose:  325 mg


Acetazolamide (Diamox 250 Mg Tab)  250 mg PO BID [facility]


   Last Admin: 03/13/19 08:44 Dose:  250 mg


Albuterol/Ipratropium (Duoneb 3 Mg/0.5 Mg (3 Ml) Ud)  3 ml INH RQ6 PRN


   PRN Reason: Shortness of Breath


Apixaban (Eliquis)  5 mg PO BID [facility]; Protocol


   Last Admin: 03/13/19 08:45 Dose:  5 mg


Atorvastatin Calcium (Lipitor)  40 mg PO HS [facility]


   Last Admin: 03/12/19 21:27 Dose:  40 mg


Betamethasone Valerate (Valisone)  1 applic TP DAILY [facility]


   Last Admin: 03/13/19 09:06 Dose:  1 applic


Bisoprolol Fumarate (Zebeta)  5 mg PO DAILY [facility]


   Last Admin: 03/13/19 08:46 Dose:  5 mg


Chlorthalidone (Hygroton)  25 mg PO DAILY [facility]


   Last Admin: 03/13/19 08:45 Dose:  25 mg


Diltiazem HCl (Cardizem Cd)  120 mg PO DAILY [facility]


   Last Admin: 03/13/19 08:45 Dose:  120 mg


Escitalopram Oxalate (Lexapro)  5 mg PO HS [facility]


   Last Admin: 03/12/19 21:26 Dose:  5 mg


Hydrocortisone (Cortef)  20 mg PO DAILY [facility]


   Last Admin: 03/13/19 08:45 Dose:  20 mg


Ipratropium Bromide (Atrovent)  0.5 mg IH RQ8 [facility]


   Last Admin: 03/13/19 07:28 Dose:  0.5 mg


Isosorbide Mononitrate (Imdur Er)  30 mg PO DAILY [facility]


   Last Admin: 03/13/19 08:45 Dose:  30 mg


Levalbuterol HCl (Xopenex)  0.63 mg INH RQ8 [facility]


   Last Admin: 03/13/19 07:28 Dose:  0.63 mg


Lisinopril (Zestril)  20 mg PO DAILY [facility]


   Last Admin: 03/13/19 08:45 Dose:  20 mg


Meclizine HCl (Antivert)  12.5 mg PO DAILY PRN


   PRN Reason: vertigo


   Last Admin: 03/09/19 09:50 Dose:  12.5 mg


Potassium Chloride (Klor-Con 10)  10 meq PO DAILY [facility]


   Last Admin: 03/13/19 08:45 Dose:  10 meq











- Labs


Labs: 


                                        





                                 03/09/19 04:30 





                                 03/12/19 05:30 











Assessment and Plan


(1) Rash and nonspecific skin eruption


Status: Acute   





(2) COPD (chronic obstructive pulmonary disease)


Status: Chronic   





(3) Hypoxemia requiring supplemental oxygen


Status: Chronic   





(4) Physical deconditioning


Status: Chronic

## 2019-03-14 LAB
BUN SERPL-MCNC: 31 MG/DL (ref 7–17)
CALCIUM SERPL-MCNC: 8.6 MG/DL (ref 8.4–10.2)
GFR NON-AFRICAN AMERICAN: 39

## 2019-03-14 RX ADMIN — IPRATROPIUM BROMIDE SCH: 0.5 SOLUTION RESPIRATORY (INHALATION) at 23:36

## 2019-03-14 RX ADMIN — LEVALBUTEROL SCH MG: 0.63 SOLUTION RESPIRATORY (INHALATION) at 15:31

## 2019-03-14 RX ADMIN — POTASSIUM CHLORIDE SCH MEQ: 10 TABLET, FILM COATED, EXTENDED RELEASE ORAL at 08:14

## 2019-03-14 RX ADMIN — LEVALBUTEROL SCH MG: 0.63 SOLUTION RESPIRATORY (INHALATION) at 07:29

## 2019-03-14 RX ADMIN — IPRATROPIUM BROMIDE SCH MG: 0.5 SOLUTION RESPIRATORY (INHALATION) at 15:31

## 2019-03-14 RX ADMIN — DILTIAZEM HYDROCHLORIDE SCH MG: 120 CAPSULE, COATED, EXTENDED RELEASE ORAL at 08:12

## 2019-03-14 RX ADMIN — IPRATROPIUM BROMIDE SCH MG: 0.5 SOLUTION RESPIRATORY (INHALATION) at 07:29

## 2019-03-14 RX ADMIN — LEVALBUTEROL SCH: 0.63 SOLUTION RESPIRATORY (INHALATION) at 23:36

## 2019-03-14 NOTE — CP.PCM.PN
Subjective





- Date & Time of Evaluation


Date of Evaluation: 03/14/19


Time of Evaluation: 09:56





- Subjective


Subjective: 





Podiatry progress Note: Dr. Barrera





87 year old female patient seen and evaluated at the bedside in TCU for b/l 

lower extremity chronic stasis edema, dermatitis with possible cellulitis. 

Patient states that she doesn't have any pain in her legs now. Patient states 

that she started to have some itchiness today in her legs. She denies any 

overnight nausea/vomiting/fever/chest pain/chills, or shortness of breath. 
































Objective





- Vital Signs/Intake and Output


Vital Signs (last 24 hours): 


                                        











Temp Pulse Resp BP Pulse Ox


 


 97.7 F   56 L  18   109/57 L  99 


 


 03/14/19 08:11  03/14/19 08:14  03/14/19 08:11  03/14/19 08:14  03/14/19 08:11











- Medications


Medications: 


                               Current Medications





Acetaminophen (Tylenol 325mg Tab)  325 mg PO Q6 PRN


   PRN Reason: Pain, Mild (1-3)


   Last Admin: 03/08/19 20:04 Dose:  325 mg


Acetazolamide (Diamox 250 Mg Tab)  250 mg PO BID [facility]


   Last Admin: 03/14/19 08:13 Dose:  250 mg


Albuterol/Ipratropium (Duoneb 3 Mg/0.5 Mg (3 Ml) Ud)  3 ml INH RQ6 PRN


   PRN Reason: Shortness of Breath


Apixaban (Eliquis)  5 mg PO BID [facility]; Protocol


   Last Admin: 03/14/19 08:13 Dose:  5 mg


Atorvastatin Calcium (Lipitor)  40 mg PO HS [facility]


   Last Admin: 03/13/19 21:10 Dose:  40 mg


Betamethasone Valerate (Valisone)  1 applic TP DAILY [facility]


   Last Admin: 03/14/19 08:14 Dose:  1 applic


Bisoprolol Fumarate (Zebeta)  5 mg PO DAILY [facility]


   Last Admin: 03/14/19 08:13 Dose:  5 mg


Chlorthalidone (Hygroton)  25 mg PO DAILY [facility]


   Last Admin: 03/14/19 08:15 Dose:  25 mg


Diltiazem HCl (Cardizem Cd)  120 mg PO DAILY [facility]


   Last Admin: 03/14/19 08:12 Dose:  120 mg


Escitalopram Oxalate (Lexapro)  5 mg PO [facility]


   Last Admin: 03/13/19 21:10 Dose:  5 mg


Hydrocortisone (Cortef)  20 mg PO DAILY [facility]


   Last Admin: 03/14/19 08:14 Dose:  20 mg


Ipratropium Bromide (Atrovent)  0.5 mg IH RQ8 [facility]


   Last Admin: 03/14/19 07:29 Dose:  0.5 mg


Isosorbide Mononitrate (Imdur Er)  30 mg PO DAILY [facility]


   Last Admin: 03/14/19 08:14 Dose:  30 mg


Levalbuterol HCl (Xopenex)  0.63 mg INH RQ8 [facility]


   Last Admin: 03/14/19 07:29 Dose:  0.63 mg


Lisinopril (Zestril)  20 mg PO DAILY [facility]


   Last Admin: 03/14/19 08:14 Dose:  20 mg


Meclizine HCl (Antivert)  12.5 mg PO DAILY PRN


   PRN Reason: vertigo


   Last Admin: 03/09/19 09:50 Dose:  12.5 mg


Potassium Chloride (Klor-Con 10)  10 meq PO DAILY [facility]


   Last Admin: 03/14/19 08:14 Dose:  10 meq











- Labs


Labs: 


                                        





                                 03/09/19 04:30 





                                 03/14/19 06:30 











- Constitutional


Appears: Well, Non-toxic, No Acute Distress





- Head Exam


Head Exam: ATRAUMATIC, NORMOCEPHALIC





- Extremities Exam


Additional comments: 





B/L lower extremities focused exam:





Vascular: DP/PT 1/4, Cap refill < 3 seconds, Temp gradient warm to warm, No 

edema appreciated to bilateral lower extremities.





Neuro: Gross and protective sensation intact.





Derm: B/L mild erythema to b/l lower extremities circumfrentially beginning at 

tibial tuberosity extending down to digits, which decrease in erythema when 

elevated, Improved. No open lesions at this time, no weeping or drainage 

appreciated. No purulence, nor streaking appreciated. Dystrophic nails.





MSK: No Pain with palpation of b/l lower extremities, MMT 4/5 in all groups b/l.




 




















- Neurological Exam


Neurological Exam: Alert, Oriented x3





- Psychiatric Exam


Psychiatric exam: Normal Affect, Normal Mood





Assessment and Plan





- Assessment and Plan (Free Text)


Assessment: 








87 year old female patient seen and evaluated at the bedside with  

for b/l lower extremity chronic stasis edema, dermatitis with possible 

cellulitis. 

















Plan: 





Patient seen and evaluated 


Discussed patient plan in detail with Dr. Barrera


Charts, labs and vitals reviewed: Afebrile, WBCs 11.8 (3/9)


Ammonium lactate 12% lotion and Ace bandage applied to lower extremities  no ope

n wounds or weeping at this time 


Patient to continue elevating lower extremities.


LE US; negative for DVT 


Blood cultures taken; No growth after 5 days.


Continue IV abx as per primary team.


C/W pain management as needed. 


Right ankle X-ray 3 views; No acute fracture noted.


Continue wearing surgical shoes when ambulating.


Podiatry will continue to follow up the patient upon discharge.

## 2019-03-14 NOTE — CP.PCM.PN
Subjective





- Date & Time of Evaluation


Date of Evaluation: 03/14/19


Time of Evaluation: 14:00





- Subjective


Subjective: 





Patient seen and examined. Denied any complaint





Objective





- Vital Signs/Intake and Output


Vital Signs (last 24 hours): 


                                        











Temp Pulse Resp BP Pulse Ox


 


 97.7 F   56 L  18   109/57 L  99 


 


 03/14/19 08:11  03/14/19 08:14  03/14/19 08:11  03/14/19 08:14  03/14/19 08:11











- Medications


Medications: 


                               Current Medications





Acetaminophen (Tylenol 325mg Tab)  325 mg PO Q6 PRN


   PRN Reason: Pain, Mild (1-3)


   Last Admin: 03/08/19 20:04 Dose:  325 mg


Acetazolamide (Diamox 250 Mg Tab)  250 mg PO BID [facility]


   Last Admin: 03/14/19 08:13 Dose:  250 mg


Albuterol/Ipratropium (Duoneb 3 Mg/0.5 Mg (3 Ml) Ud)  3 ml INH RQ6 PRN


   PRN Reason: Shortness of Breath


Apixaban (Eliquis)  5 mg PO BID [facility]; Protocol


   Last Admin: 03/14/19 08:13 Dose:  5 mg


Atorvastatin Calcium (Lipitor)  40 mg PO HS [facility]


   Last Admin: 03/13/19 21:10 Dose:  40 mg


Betamethasone Valerate (Valisone)  1 applic TP DAILY [facility]


   Last Admin: 03/14/19 08:14 Dose:  1 applic


Bisoprolol Fumarate (Zebeta)  5 mg PO DAILY [facility]


   Last Admin: 03/14/19 08:13 Dose:  5 mg


Chlorthalidone (Hygroton)  25 mg PO DAILY [facility]


   Last Admin: 03/14/19 08:15 Dose:  25 mg


Diltiazem HCl (Cardizem Cd)  120 mg PO DAILY [facility]


   Last Admin: 03/14/19 08:12 Dose:  120 mg


Escitalopram Oxalate (Lexapro)  5 mg PO HS [facility]


   Last Admin: 03/13/19 21:10 Dose:  5 mg


Hydrocortisone (Cortef)  10 mg PO DAILY [facility]


Ipratropium Bromide (Atrovent)  0.5 mg IH RQ8 [facility]


   Last Admin: 03/14/19 15:31 Dose:  0.5 mg


Isosorbide Mononitrate (Imdur Er)  30 mg PO DAILY [facility]


   Last Admin: 03/14/19 08:14 Dose:  30 mg


Levalbuterol HCl (Xopenex)  0.63 mg INH RQ8 [facility]


   Last Admin: 03/14/19 15:31 Dose:  0.63 mg


Lisinopril (Zestril)  20 mg PO DAILY [facility]


   Last Admin: 03/14/19 08:14 Dose:  20 mg


Meclizine HCl (Antivert)  12.5 mg PO DAILY PRN


   PRN Reason: vertigo


   Last Admin: 03/09/19 09:50 Dose:  12.5 mg


Potassium Chloride (Klor-Con 10)  10 meq PO DAILY [facility]


   Last Admin: 03/14/19 08:14 Dose:  10 meq











- Labs


Labs: 


                                        





                                 03/09/19 04:30 





                                 03/14/19 06:30 











- Constitutional


Appears: No Acute Distress





- Head Exam


Head Exam: ATRAUMATIC





- Eye Exam


Eye Exam: absent: Scleral icterus





- ENT Exam


ENT Exam: Mucous Membranes Moist





- Neck Exam


Neck Exam: absent: Meningismus





- Respiratory Exam


Respiratory Exam: absent: Rales, Rhonchi, Wheezes, Respiratory Distress





- Cardiovascular Exam


Cardiovascular Exam: REGULAR RHYTHM, +S1, +S2





- GI/Abdominal Exam


GI & Abdominal Exam: Soft.  absent: Tenderness





- Rectal Exam


Rectal Exam: Deferred





- Extremities Exam


Extremities Exam: Pedal Edema





- Neurological Exam


Neurological Exam: Alert, Oriented x3





- Psychiatric Exam


Psychiatric exam: Normal Affect





- Skin


Skin Exam: Dry, Intact





Assessment and Plan





- Assessment and Plan (Free Text)


Assessment: 





86 yo female with history of COPD, CAD, CHF, AFib and chronic stasis dermatitis 

of both legs admitted to TCU for therapy because of physical deconditioning.








1. Physical Deconditioning


continue PT/OT 








2. Stasis dermatitis 


Podiatry on consult








3. COPD


stable 


continue Lucy Pyle on consult








4. CHF exacerbation 


Diastolic Dysfunction


Last echo on 02/04/19 with LVEF of 50-55%.


continue Lisinopril, Imdur and Bisoprolol


continue Acetazolamide for diuresis instead of Lasix


Chlorthalidone placed on hold because of azotemia








5. Paroxysmal Afib 


Rate controlled


Continue Cardizem, Bisoprolol and Eliquis

## 2019-03-14 NOTE — CP.PCM.PN
Subjective





- Date & Time of Evaluation


Date of Evaluation: 03/14/19


Time of Evaluation: 11:14





- Subjective


Subjective: 





Labs reviewed.


BUN/creat increased, likely medication related.


BP has been maintained at low normal.


Will stop chlorthalidone and reduce hydrocortisone.


Continue acetazolamide 250 BID.


Consider reducing ACEI if BP remains low-normal.





Objective





- Vital Signs/Intake and Output


Vital Signs (last 24 hours): 


                                        











Temp Pulse Resp BP Pulse Ox


 


 97.7 F   56 L  18   109/57 L  99 


 


 03/14/19 08:11  03/14/19 08:14  03/14/19 08:11  03/14/19 08:14  03/14/19 08:11











- Medications


Medications: 


                               Current Medications





Acetaminophen (Tylenol 325mg Tab)  325 mg PO Q6 PRN


   PRN Reason: Pain, Mild (1-3)


   Last Admin: 03/08/19 20:04 Dose:  325 mg


Acetazolamide (Diamox 250 Mg Tab)  250 mg PO BID [facility]


   Last Admin: 03/14/19 08:13 Dose:  250 mg


Albuterol/Ipratropium (Duoneb 3 Mg/0.5 Mg (3 Ml) Ud)  3 ml INH RQ6 PRN


   PRN Reason: Shortness of Breath


Apixaban (Eliquis)  5 mg PO BID [facility]; Protocol


   Last Admin: 03/14/19 08:13 Dose:  5 mg


Atorvastatin Calcium (Lipitor)  40 mg PO HS [facility]


   Last Admin: 03/13/19 21:10 Dose:  40 mg


Betamethasone Valerate (Valisone)  1 applic TP DAILY [facility]


   Last Admin: 03/14/19 08:14 Dose:  1 applic


Bisoprolol Fumarate (Zebeta)  5 mg PO DAILY [facility]


   Last Admin: 03/14/19 08:13 Dose:  5 mg


Chlorthalidone (Hygroton)  25 mg PO DAILY [facility]


   Last Admin: 03/14/19 08:15 Dose:  25 mg


Diltiazem HCl (Cardizem Cd)  120 mg PO DAILY [facility]


   Last Admin: 03/14/19 08:12 Dose:  120 mg


Escitalopram Oxalate (Lexapro)  5 mg PO HS [facility]


   Last Admin: 03/13/19 21:10 Dose:  5 mg


Hydrocortisone (Cortef)  10 mg PO DAILY [facility]


Ipratropium Bromide (Atrovent)  0.5 mg IH RQ8 [facility]


   Last Admin: 03/14/19 07:29 Dose:  0.5 mg


Isosorbide Mononitrate (Imdur Er)  30 mg PO DAILY [facility]


   Last Admin: 03/14/19 08:14 Dose:  30 mg


Levalbuterol HCl (Xopenex)  0.63 mg INH RQ8 [facility]


   Last Admin: 03/14/19 07:29 Dose:  0.63 mg


Lisinopril (Zestril)  20 mg PO DAILY [facility]


   Last Admin: 03/14/19 08:14 Dose:  20 mg


Meclizine HCl (Antivert)  12.5 mg PO DAILY [facility]N


   PRN Reason: vertigo


   Last Admin: 03/09/19 09:50 Dose:  12.5 mg


Potassium Chloride (Klor-Con 10)  10 meq PO DAILY [facility]


   Last Admin: 03/14/19 08:14 Dose:  10 meq











- Labs


Labs: 


                                        





                                 03/09/19 04:30 





                                 03/14/19 06:30 











Assessment and Plan


(1) Rash and nonspecific skin eruption


Status: Acute   





(2) COPD (chronic obstructive pulmonary disease)


Status: Chronic   





(3) Hypoxemia requiring supplemental oxygen


Status: Chronic   





(4) Physical deconditioning


Status: Chronic

## 2019-03-15 LAB
ARTERIAL BLOOD GAS HEMOGLOBIN: 10.8 G/DL (ref 11.7–17.4)
ARTERIAL BLOOD GAS O2 SAT: 97 % (ref 95–98)
ARTERIAL BLOOD GAS PCO2: 53 MM/HG (ref 35–45)
ARTERIAL BLOOD GAS TCO2: 30.2 MMOL/L (ref 22–28)
ARTERIAL PATENCY WRIST A: YES
HCO3 BLDA-SCNC: 26.4 MMOL/L (ref 21–28)
INHALED O2 CONCENTRATION: 28 %
O2 CAP BLDA-SCNC: 14.7 ML/DL (ref 16–24)
O2 CT BLDA-SCNC: 14.3 ML/DL (ref 15–23)
PH BLDA: 7.34 [PH] (ref 7.35–7.45)
PO2 BLDA: 72 MM/HG (ref 80–100)

## 2019-03-15 RX ADMIN — IPRATROPIUM BROMIDE SCH MG: 0.5 SOLUTION RESPIRATORY (INHALATION) at 07:32

## 2019-03-15 RX ADMIN — LEVALBUTEROL SCH MG: 0.63 SOLUTION RESPIRATORY (INHALATION) at 23:20

## 2019-03-15 RX ADMIN — POTASSIUM CHLORIDE SCH MEQ: 10 TABLET, FILM COATED, EXTENDED RELEASE ORAL at 08:31

## 2019-03-15 RX ADMIN — DILTIAZEM HYDROCHLORIDE SCH MG: 120 CAPSULE, COATED, EXTENDED RELEASE ORAL at 08:31

## 2019-03-15 RX ADMIN — IPRATROPIUM BROMIDE SCH MG: 0.5 SOLUTION RESPIRATORY (INHALATION) at 15:05

## 2019-03-15 RX ADMIN — IPRATROPIUM BROMIDE SCH MG: 0.5 SOLUTION RESPIRATORY (INHALATION) at 23:20

## 2019-03-15 RX ADMIN — LEVALBUTEROL SCH MG: 0.63 SOLUTION RESPIRATORY (INHALATION) at 15:05

## 2019-03-15 RX ADMIN — LEVALBUTEROL SCH MG: 0.63 SOLUTION RESPIRATORY (INHALATION) at 07:32

## 2019-03-15 NOTE — CP.PCM.PN
Subjective





- Date & Time of Evaluation


Date of Evaluation: 03/15/19


Time of Evaluation: 14:15





- Subjective


Subjective: 





Ambulating with respiratory therapist.


Appears to be doing well on present regimen.


BP has been low-normal daily, Hygroton has been on hold today.


Will reduce lisinopril to 10MG OD and D/C chlorthalidone.


Reduce Acetazolamide to 250 once daily. 


Repeat BMP in M tomorrow.





Objective





- Vital Signs/Intake and Output


Vital Signs (last 24 hours): 


                                        











Temp Pulse Resp BP Pulse Ox


 


 98.3 F   62   19   108/57 L  93 L


 


 03/15/19 08:18  03/15/19 09:27  03/15/19 08:18  03/15/19 08:31  03/15/19 09:27











- Medications


Medications: 


                               Current Medications





Acetaminophen (Tylenol 325mg Tab)  325 mg PO Q6 PRN


   PRN Reason: Pain, Mild (1-3)


   Last Admin: 03/08/19 20:04 Dose:  325 mg


Acetazolamide (Diamox 250 Mg Tab)  250 mg PO BID [facility]


   Last Admin: 03/15/19 08:31 Dose:  250 mg


Albuterol/Ipratropium (Duoneb 3 Mg/0.5 Mg (3 Ml) Ud)  3 ml INH RQ6 PRN


   PRN Reason: Shortness of Breath


Apixaban (Eliquis)  5 mg PO BID [facility]; Protocol


   Last Admin: 03/15/19 08:29 Dose:  5 mg


Atorvastatin Calcium (Lipitor)  40 mg PO HS [facility]


   Last Admin: 03/14/19 22:56 Dose:  40 mg


Betamethasone Valerate (Valisone)  1 applic TP DAILY [facility]


   Last Admin: 03/15/19 08:30 Dose:  1 applic


Bisoprolol Fumarate (Zebeta)  5 mg PO DAILY [facility]


   Last Admin: 03/15/19 08:30 Dose:  5 mg


Chlorthalidone (Hygroton)  25 mg PO DAILY [facility]


   Last Admin: 03/14/19 08:15 Dose:  25 mg


Diltiazem HCl (Cardizem Cd)  120 mg PO DAILY [facility]


   Last Admin: 03/15/19 08:31 Dose:  120 mg


Escitalopram Oxalate (Lexapro)  5 mg PO HS [facility]


   Last Admin: 03/14/19 22:56 Dose:  5 mg


Hydrocortisone (Cortef)  10 mg PO DAILY [facility]


   Last Admin: 03/15/19 08:31 Dose:  10 mg


Ipratropium Bromide (Atrovent)  0.5 mg IH RQ8 [facility]


   Last Admin: 03/15/19 07:32 Dose:  0.5 mg


Isosorbide Mononitrate (Imdur Er)  30 mg PO DAILY [facility]


   Last Admin: 03/15/19 08:32 Dose:  Not Given


Levalbuterol HCl (Xopenex)  0.63 mg INH RQ8 [facility]


   Last Admin: 03/15/19 07:32 Dose:  0.63 mg


Lisinopril (Zestril)  10 mg PO DAILY [facility]


Meclizine HCl (Antivert)  12.5 mg PO DAILY [facility]N


   PRN Reason: vertigo


   Last Admin: 03/09/19 09:50 Dose:  12.5 mg


Potassium Chloride (Klor-Con 10)  10 meq PO DAILY [facility]


   Last Admin: 03/15/19 08:31 Dose:  10 meq











- Labs


Labs: 


                                        





                                 03/09/19 04:30 





                                 03/14/19 06:30 











Assessment and Plan


(1) Rash and nonspecific skin eruption


Status: Acute   





(2) COPD (chronic obstructive pulmonary disease)


Status: Chronic   





(3) Hypoxemia requiring supplemental oxygen


Status: Chronic   





(4) Physical deconditioning


Status: Chronic

## 2019-03-16 RX ADMIN — POTASSIUM CHLORIDE SCH MEQ: 10 TABLET, FILM COATED, EXTENDED RELEASE ORAL at 09:21

## 2019-03-16 RX ADMIN — LEVALBUTEROL SCH MG: 0.63 SOLUTION RESPIRATORY (INHALATION) at 15:32

## 2019-03-16 RX ADMIN — IPRATROPIUM BROMIDE SCH MG: 0.5 SOLUTION RESPIRATORY (INHALATION) at 23:26

## 2019-03-16 RX ADMIN — LEVALBUTEROL SCH MG: 0.63 SOLUTION RESPIRATORY (INHALATION) at 08:24

## 2019-03-16 RX ADMIN — LEVALBUTEROL SCH MG: 0.63 SOLUTION RESPIRATORY (INHALATION) at 23:26

## 2019-03-16 RX ADMIN — DILTIAZEM HYDROCHLORIDE SCH MG: 120 CAPSULE, COATED, EXTENDED RELEASE ORAL at 09:22

## 2019-03-16 RX ADMIN — IPRATROPIUM BROMIDE SCH MG: 0.5 SOLUTION RESPIRATORY (INHALATION) at 08:24

## 2019-03-16 RX ADMIN — IPRATROPIUM BROMIDE SCH MG: 0.5 SOLUTION RESPIRATORY (INHALATION) at 15:32

## 2019-03-16 NOTE — CP.PCM.PN
Subjective





- Date & Time of Evaluation


Date of Evaluation: 03/16/19


Time of Evaluation: 08:37





- Subjective


Subjective: 


Podiatry progress Note: Dr. Barrera





87 year old female patient seen and evaluated at the bedside in TCU for b/l 

lower extremity chronic stasis edema, dermatitis with possible cellulitis. 

Patient states that she doesn't have any pain in her legs now. Patient states 

that she started to have some itchiness today in her legs. She denies any 

overnight nausea/vomiting/fever/chest pain/chills, or shortness of breath. 








Objective





- Vital Signs/Intake and Output


Vital Signs (last 24 hours): 


                                        











Temp Pulse Resp BP Pulse Ox


 


 97.0 F L  60   20   108/56 L  95 


 


 03/16/19 08:11  03/16/19 08:11  03/16/19 08:11  03/16/19 08:11  03/16/19 08:11











- Medications


Medications: 


                               Current Medications





Acetaminophen (Tylenol 325mg Tab)  325 mg PO Q6 PRN


   PRN Reason: Pain, Mild (1-3)


   Last Admin: 03/08/19 20:04 Dose:  325 mg


Acetazolamide (Diamox 250 Mg Tab)  250 mg PO DAILY [facility]


Albuterol/Ipratropium (Duoneb 3 Mg/0.5 Mg (3 Ml) Ud)  3 ml INH RQ6 PRN


   PRN Reason: Shortness of Breath


Apixaban (Eliquis)  5 mg PO BID [facility]; Protocol


   Last Admin: 03/15/19 17:00 Dose:  5 mg


Atorvastatin Calcium (Lipitor)  40 mg PO HS [facility]


   Last Admin: 03/15/19 21:25 Dose:  40 mg


Betamethasone Valerate (Valisone)  1 applic TP DAILY [facility]


   Last Admin: 03/15/19 08:30 Dose:  1 applic


Bisoprolol Fumarate (Zebeta)  5 mg PO DAILY [facility]


   Last Admin: 03/15/19 08:30 Dose:  5 mg


Diltiazem HCl (Cardizem Cd)  120 mg PO DAILY [facility]


   Last Admin: 03/15/19 08:31 Dose:  120 mg


Escitalopram Oxalate (Lexapro)  5 mg PO HS [facility]


   Last Admin: 03/15/19 21:25 Dose:  5 mg


Hydrocortisone (Cortef)  10 mg PO DAILY [facility]


   Last Admin: 03/15/19 08:31 Dose:  10 mg


Ipratropium Bromide (Atrovent)  0.5 mg IH RQ8 [facility]


   Last Admin: 03/16/19 08:24 Dose:  0.5 mg


Isosorbide Mononitrate (Imdur Er)  30 mg PO DAILY [facility]


   Last Admin: 03/15/19 08:32 Dose:  Not Given


Levalbuterol HCl (Xopenex)  0.63 mg INH RQ8 [facility]


   Last Admin: 03/16/19 08:24 Dose:  0.63 mg


Lisinopril (Zestril)  10 mg PO DAILY [facility]


Meclizine HCl (Antivert)  12.5 mg PO DAILY PRN


   PRN Reason: vertigo


   Last Admin: 03/09/19 09:50 Dose:  12.5 mg


Potassium Chloride (Klor-Con 10)  10 meq PO DAILY [facility]


   Last Admin: 03/15/19 08:31 Dose:  10 meq











- Labs


Labs: 


                                        





                                 03/09/19 04:30 





                                 03/14/19 06:30 











- Constitutional


Appears: Well, Non-toxic, No Acute Distress





- Head Exam


Head Exam: ATRAUMATIC, NORMOCEPHALIC





- Extremities Exam


Additional comments: 


B/L lower extremities focused exam:





Vascular: DP/PT 1/4, Cap refill < 3 seconds, Temp gradient warm to warm, No 

edema appreciated to bilateral lower extremities.





Neuro: Gross and protective sensation intact.





Derm: B/L mild erythema to b/l lower extremities circumfrentially beginning at 

tibial tuberosity extending down to digits, which decrease in erythema when 

elevated, Improved. No open lesions at this time, no weeping or drainage 

appreciated. No purulence, nor streaking appreciated. Dystrophic nails.





MSK: No Pain with palpation of b/l lower extremities, MMT 4/5 in all groups b/l.










- Neurological Exam


Neurological Exam: Alert, Awake, Oriented x3





- Psychiatric Exam


Psychiatric exam: Normal Affect, Normal Mood





Assessment and Plan





- Assessment and Plan (Free Text)


Assessment: 





87 year old female patient seen and evaluated for b/l lower extremity chronic 

stasis edema, dermatitis with possible cellulitis. 








Plan: 


Patient seen and evaluated 


Discussed patient plan in detail with Dr. Barrera


Charts, labs and vitals reviewed: Afebrile, WBCs 11.8 (3/9)


Ammonium lactate 12% lotion applied to lower extremities no open wounds or 

weeping at this time 


Patient to continue elevating lower extremities


LE US: negative for DVT 


Right ankle X-ray 3 views: No acute fracture noted


Blood cultures taken: No growth after 5 days


Continue IV abx as per primary team


C/W pain management as needed.


Continue wearing surgical shoes when ambulating


Podiatry will continue to follow

## 2019-03-16 NOTE — CP.PCM.PN
Subjective





- Date & Time of Evaluation


Date of Evaluation: 03/16/19


Time of Evaluation: 11:28





- Subjective


Subjective: 





Asleep in bed, easily awakens.


Appears comfortable at rest.


No SOB or muscle recruitment.


ABG done yesterday showed some mild hypercapnia, adequate O2.


LE's are still erythematous, but swelling is much improved.


No cyanosis or ecchymosis. No jaundice.


Speech is fluent, memory is fair, no focal motor weakness.


Neck is supple and trachea midline.


Breath sounds are markedly diminished bilaterally.


No audible wheezes. Dry rales heard in lower lobes.


Heart sounds are distant, rhythm regular.





Maintain current medical regimen. 


BMP requested for today.


Likely discharge to home next 24-48hrs.





Objective





- Vital Signs/Intake and Output


Vital Signs (last 24 hours): 


                                        











Temp Pulse Resp BP Pulse Ox


 


 97.0 F L  60   20   108/56 L  95 


 


 03/16/19 08:11  03/16/19 09:23  03/16/19 08:11  03/16/19 09:23  03/16/19 08:11











- Medications


Medications: 


                               Current Medications





Acetaminophen (Tylenol 325mg Tab)  325 mg PO Q6 PRN


   PRN Reason: Pain, Mild (1-3)


   Last Admin: 03/08/19 20:04 Dose:  325 mg


Acetazolamide (Diamox 250 Mg Tab)  250 mg PO DAILY [facility]


   Last Admin: 03/16/19 09:22 Dose:  250 mg


Albuterol/Ipratropium (Duoneb 3 Mg/0.5 Mg (3 Ml) Ud)  3 ml INH RQ6 PRN


   PRN Reason: Shortness of Breath


Apixaban (Eliquis)  5 mg PO BID [facility]; Protocol


   Last Admin: 03/16/19 09:20 Dose:  5 mg


Atorvastatin Calcium (Lipitor)  40 mg PO HS [facility]


   Last Admin: 03/15/19 21:25 Dose:  40 mg


Betamethasone Valerate (Valisone)  1 applic TP DAILY [facility]


   Last Admin: 03/16/19 09:20 Dose:  1 applic


Bisoprolol Fumarate (Zebeta)  5 mg PO DAILY [facility]


   Last Admin: 03/16/19 09:22 Dose:  Not Given


Diltiazem HCl (Cardizem Cd)  120 mg PO DAILY [facility]


   Last Admin: 03/16/19 09:22 Dose:  120 mg


Escitalopram Oxalate (Lexapro)  5 mg PO HS [facility]


   Last Admin: 03/15/19 21:25 Dose:  5 mg


Hydrocortisone (Cortef)  10 mg PO DAILY [facility]


   Last Admin: 03/16/19 09:21 Dose:  10 mg


Ipratropium Bromide (Atrovent)  0.5 mg IH RQ8 [facility]


   Last Admin: 03/16/19 08:24 Dose:  0.5 mg


Isosorbide Mononitrate (Imdur Er)  30 mg PO DAILY [facility]


   Last Admin: 03/16/19 09:21 Dose:  30 mg


Levalbuterol HCl (Xopenex)  0.63 mg INH RQ8 [facility]


   Last Admin: 03/16/19 08:24 Dose:  0.63 mg


Lisinopril (Zestril)  10 mg PO DAILY [facility]


   Last Admin: 03/16/19 09:23 Dose:  Not Given


Meclizine HCl (Antivert)  12.5 mg PO DAILY [facility]


   PRN Reason: vertigo


   Last Admin: 03/09/19 09:50 Dose:  12.5 mg


Potassium Chloride (Klor-Con 10)  10 meq PO DAILY [facility]


   Last Admin: 03/16/19 09:21 Dose:  10 meq











- Labs


Labs: 


                                        





                                 03/09/19 04:30 





                                 03/14/19 06:30 











Assessment and Plan


(1) Rash and nonspecific skin eruption


Status: Acute   





(2) COPD (chronic obstructive pulmonary disease)


Status: Chronic   





(3) Hypoxemia requiring supplemental oxygen


Status: Chronic   





(4) Physical deconditioning


Status: Chronic

## 2019-03-17 RX ADMIN — POTASSIUM CHLORIDE SCH MEQ: 10 TABLET, FILM COATED, EXTENDED RELEASE ORAL at 09:23

## 2019-03-17 RX ADMIN — IPRATROPIUM BROMIDE SCH MG: 0.5 SOLUTION RESPIRATORY (INHALATION) at 07:21

## 2019-03-17 RX ADMIN — IPRATROPIUM BROMIDE SCH MG: 0.5 SOLUTION RESPIRATORY (INHALATION) at 15:12

## 2019-03-17 RX ADMIN — LEVALBUTEROL SCH MG: 0.63 SOLUTION RESPIRATORY (INHALATION) at 23:04

## 2019-03-17 RX ADMIN — LEVALBUTEROL SCH MG: 0.63 SOLUTION RESPIRATORY (INHALATION) at 15:12

## 2019-03-17 RX ADMIN — IPRATROPIUM BROMIDE SCH MG: 0.5 SOLUTION RESPIRATORY (INHALATION) at 23:04

## 2019-03-17 RX ADMIN — DILTIAZEM HYDROCHLORIDE SCH MG: 120 CAPSULE, COATED, EXTENDED RELEASE ORAL at 09:22

## 2019-03-17 RX ADMIN — LEVALBUTEROL SCH MG: 0.63 SOLUTION RESPIRATORY (INHALATION) at 07:21

## 2019-03-18 VITALS
OXYGEN SATURATION: 95 % | RESPIRATION RATE: 18 BRPM | DIASTOLIC BLOOD PRESSURE: 58 MMHG | SYSTOLIC BLOOD PRESSURE: 123 MMHG | TEMPERATURE: 98.5 F | HEART RATE: 60 BPM

## 2019-03-18 LAB
BUN SERPL-MCNC: 27 MG/DL (ref 7–17)
CALCIUM SERPL-MCNC: 8.9 MG/DL (ref 8.4–10.2)
GFR NON-AFRICAN AMERICAN: 59

## 2019-03-18 RX ADMIN — DILTIAZEM HYDROCHLORIDE SCH MG: 120 CAPSULE, COATED, EXTENDED RELEASE ORAL at 09:14

## 2019-03-18 RX ADMIN — LEVALBUTEROL SCH MG: 0.63 SOLUTION RESPIRATORY (INHALATION) at 07:31

## 2019-03-18 RX ADMIN — IPRATROPIUM BROMIDE SCH MG: 0.5 SOLUTION RESPIRATORY (INHALATION) at 07:31

## 2019-03-18 RX ADMIN — POTASSIUM CHLORIDE SCH MEQ: 10 TABLET, FILM COATED, EXTENDED RELEASE ORAL at 09:16

## 2019-03-18 NOTE — CP.PCM.DIS
Provider





- Provider


Date of Admission: 


03/08/19 17:32





Attending physician: 


Luann Gonzalez DO





Consults: 








03/08/19 17:35


Pulmonology Consult Routine 


   Comment: 


   Consulting Provider: Molina Pyle


   Consulting Physician: Molina Pyle


   Reason for Consult: Heart Healthy





03/08/19 17:38


Podiatry Consult Routine 


   Comment: 


   Consulting Provider: Tamir Barrera


   Consulting Physician: Tamir Barrera


   Reason for Consult: LE Edema, Stasis dermatitis, Ankle pain











Time Spent in preparation of Discharge (in minutes): 25





Diagnosis





- Discharge Diagnosis


(1) Physical deconditioning


Status: Acute   Priority: High   


Comment: did well with PT/OT   





(2) Stasis dermatitis


Status: Chronic   


Comment: improved.  continue Betamethasone cream application   





(3) COPD (chronic obstructive pulmonary disease)


Status: Chronic   Priority: High   


Comment: stable   





(4) CHF (congestive heart failure)


Status: Chronic   Priority: High   


Comment: stable.  continue Lisinopril, Imdur and Bisoprolol.  continue 

Acetazolamide   





(5) Afib


Status: Resolved   Priority: High   


Comment: rate controlled.  continue Cardizem, Bisoprol and Eliis   





Hospital Course





- Lab Results


Lab Results: 


                             Most Recent Lab Values











WBC  11.8 K/uL (4.8-10.8)  H  03/09/19  04:30    


 


RBC  3.70 Mil/uL (3.80-5.20)  L  03/09/19  04:30    


 


Hgb  10.3 g/dL (12.0-16.0)  L  03/09/19  04:30    


 


Hct  31.8 % (34.0-47.0)  L  03/09/19  04:30    


 


MCV  86.0 fl (81.0-99.0)   03/09/19  04:30    


 


MCH  27.8 pg (27.0-31.0)   03/09/19  04:30    


 


MCHC  32.3 g/dL (33.0-37.0)  L  03/09/19  04:30    


 


RDW  15.4 % (11.5-14.5)  H  03/09/19  04:30    


 


Plt Count  359 K/uL (130-400)   03/09/19  04:30    


 


pCO2  53 mm/Hg (35-45)  H  03/15/19  17:17    


 


pO2  72 mm/Hg ()  L  03/15/19  17:17    


 


HCO3  26.4 mmol/L (21-28)   03/15/19  17:17    


 


ABG pH  7.34  (7.35-7.45)  L  03/15/19  17:17    


 


ABG Total CO2  30.2 mmol/L (22-28)  H  03/15/19  17:17    


 


ABG O2 Saturation  97.0 % (95-98)   03/15/19  17:17    


 


ABG O2 Content  14.3 ML/dL (15-23)  L  03/15/19  17:17    


 


ABG Base Excess  2.0 mmol/L (-2.0-3.0)   03/15/19  17:17    


 


ABG Hemoglobin  10.8 g/dL (11.7-17.4)  L  03/15/19  17:17    


 


ABG Carboxyhemoglobin  1.7 % (0.5-1.5)  H  03/15/19  17:17    


 


POC ABG HHb (Measured)  2.9 % (0.0-5.0)   03/15/19  17:17    


 


ABG Methemoglobin  1.4 % (0.0-3.0)   03/15/19  17:17    


 


ABG O2 Capacity  14.7 mL/dL (16-24)  L  03/15/19  17:17    


 


Chito Test  Yes   03/15/19  17:17    


 


A-a O2 Difference  61.0 mm/Hg  03/15/19  17:17    


 


Hgb O2 Saturation  94.0 % (95.0-98.0)  L  03/15/19  17:17    


 


FiO2  28.0 %  03/15/19  17:17    


 


Sodium  140 mmol/l (132-148)   03/18/19  06:45    


 


Potassium  3.9 MMOL/L (3.6-5.0)   03/18/19  06:45    


 


Chloride  101 mmol/L ()   03/18/19  06:45    


 


Carbon Dioxide  24 mmol/L (22-30)   03/18/19  06:45    


 


Anion Gap  19  (10-20)   03/18/19  06:45    


 


BUN  27 mg/dl (7-17)  H  03/18/19  06:45    


 


Creatinine  0.9 mg/dl (0.7-1.2)   03/18/19  06:45    


 


Est GFR ( Amer)  > 60   03/18/19  06:45    


 


Est GFR (Non-Af Amer)  59   03/18/19  06:45    


 


POC Glucose (mg/dL)  129 mg/dL ()  H  03/15/19  16:16    


 


Random Glucose  138 mg/dL ()  H  03/18/19  06:45    


 


Calcium  8.9 mg/dL (8.4-10.2)   03/18/19  06:45    














- Hospital Course


Hospital Course: 





88 yo female with history of COPD, CAD, CHF, AFib and chronic venous stasis 

dermatitis was admitted to TCU on 3/9/2019 for therapy after she was managed 

with IV Vancomycin for cellulitis of both legs. She did well with therapy and 

now is ready to go home in stable condition.





























Discharge Exam





- Head Exam


Head Exam: ATRAUMATIC, NORMOCEPHALIC





- Eye Exam


Eye Exam: absent: Scleral icterus





- ENT Exam


ENT Exam: Mucous Membranes Moist





- Respiratory Exam


Respiratory Exam: absent: Rales, Rhonchi, Wheezes, Respiratory Distress





- Cardiovascular Exam


Cardiovascular Exam: REGULAR RHYTHM, +S1, +S2





- GI/Abdominal Exam


GI & Abdominal Exam: Soft.  absent: Tenderness





- Rectal Exam


Rectal Exam: Deferred





- Extremities Exam


Extremities exam: pedal edema





- Neurological Exam


Neurological exam: Alert, Oriented x3





- Psychiatric Exam


Psychiatric exam: Normal Affect





- Skin


Skin Exam: Dry, Intact





Discharge Plan





- Discharge Medications


Prescriptions: 


acetaZOLAMIDE [Diamox 250 mg Tab] 250 mg PO DAILY #30 tab


Betamethasone Valerate [Valisone] 1 applic TP DAILY #1 tube


Hydrocortisone [Cortef] 10 mg PO DAILY #30 tab





- Follow Up Plan


Condition: GOOD


Disposition: HOME/ ROUTINE


Instructions:  Cellulitis (Skin Infection), Adult (DC)


Additional Instructions: 


Follow up with primary within one week

## 2019-03-18 NOTE — CP.PCM.PN
Subjective





- Date & Time of Evaluation


Date of Evaluation: 03/18/19


Time of Evaluation: 11:28





- Subjective


Subjective: 





Ready for discharge.


Reviewed all medications with nursing and the patient.


Spoke to pharmacy, new meds ordered.


Discussed use of home O2 with patient; reduced to 1 LPM nasal cnula.


Follow up office visit next week; call office tomorrow for appointment.





Objective





- Vital Signs/Intake and Output


Vital Signs (last 24 hours): 


                                        











Temp Pulse Resp BP Pulse Ox


 


 98.5 F   60   18   123/58 L  95 


 


 03/18/19 08:38  03/18/19 09:15  03/18/19 08:38  03/18/19 09:15  03/18/19 08:38











- Medications


Medications: 


                               Current Medications





Acetaminophen (Tylenol 325mg Tab)  325 mg PO Q6 PRN


   PRN Reason: Pain, Mild (1-3)


   Last Admin: 03/08/19 20:04 Dose:  325 mg


Acetazolamide (Diamox 250 Mg Tab)  250 mg PO DAILY [facility]


   Last Admin: 03/18/19 09:16 Dose:  250 mg


Albuterol/Ipratropium (Duoneb 3 Mg/0.5 Mg (3 Ml) Ud)  3 ml INH RQ6 PRN


   PRN Reason: Shortness of Breath


Apixaban (Eliquis)  5 mg PO BID [facility]; Protocol


   Last Admin: 03/18/19 09:14 Dose:  5 mg


Atorvastatin Calcium (Lipitor)  40 mg PO HS [facility]


   Last Admin: 03/17/19 21:45 Dose:  40 mg


Betamethasone Valerate (Valisone)  1 applic TP DAILY [facility]


   Last Admin: 03/18/19 09:15 Dose:  1 applic


Bisoprolol Fumarate (Zebeta)  5 mg PO DAILY [facility]


   Last Admin: 03/18/19 09:15 Dose:  5 mg


Diltiazem HCl (Cardizem Cd)  120 mg PO DAILY [facility]


   Last Admin: 03/18/19 09:14 Dose:  120 mg


Escitalopram Oxalate (Lexapro)  5 mg PO HS [facility]


   Last Admin: 03/17/19 21:45 Dose:  5 mg


Hydrocortisone (Cortef)  10 mg PO DAILY [facility]


   Last Admin: 03/18/19 09:16 Dose:  10 mg


Ipratropium Bromide (Atrovent)  0.5 mg IH RQ8 [facility]


   Last Admin: 03/18/19 07:31 Dose:  0.5 mg


Isosorbide Mononitrate (Imdur Er)  30 mg PO DAILY [facility]


   Last Admin: 03/17/19 09:23 Dose:  30 mg


Levalbuterol HCl (Xopenex)  0.63 mg INH RQ8 [facility]


   Last Admin: 03/18/19 07:31 Dose:  0.63 mg


Lisinopril (Zestril)  10 mg PO DAILY [facility]


   Last Admin: 03/18/19 09:15 Dose:  10 mg


Meclizine HCl (Antivert)  12.5 mg PO DAILY [facility]


   PRN Reason: vertigo


   Last Admin: 03/09/19 09:50 Dose:  12.5 mg


Potassium Chloride (Klor-Con 10)  10 meq PO DAILY [facility]


   Last Admin: 03/18/19 09:16 Dose:  10 meq











- Labs


Labs: 


                                        





                                 03/09/19 04:30 





                                 03/18/19 06:45 











Assessment and Plan


(1) Rash and nonspecific skin eruption


Status: Acute   





(2) COPD (chronic obstructive pulmonary disease)


Status: Chronic   





(3) Hypoxemia requiring supplemental oxygen


Status: Chronic   





(4) Physical deconditioning


Status: Acute

## 2019-03-26 ENCOUNTER — HOSPITAL ENCOUNTER (INPATIENT)
Dept: HOSPITAL 14 - H.ER | Age: 84
LOS: 3 days | Discharge: NURSING, REHAB OR LONG-TERM CARE | DRG: 189 | End: 2019-03-29
Attending: HOSPITALIST | Admitting: HOSPITALIST
Payer: MEDICARE

## 2019-03-26 VITALS — BODY MASS INDEX: 35.8 KG/M2

## 2019-03-26 DIAGNOSIS — Z87.01: ICD-10-CM

## 2019-03-26 DIAGNOSIS — L03.116: ICD-10-CM

## 2019-03-26 DIAGNOSIS — I87.2: ICD-10-CM

## 2019-03-26 DIAGNOSIS — I48.0: ICD-10-CM

## 2019-03-26 DIAGNOSIS — Z99.81: ICD-10-CM

## 2019-03-26 DIAGNOSIS — Z79.01: ICD-10-CM

## 2019-03-26 DIAGNOSIS — E66.9: ICD-10-CM

## 2019-03-26 DIAGNOSIS — L03.115: ICD-10-CM

## 2019-03-26 DIAGNOSIS — E78.00: ICD-10-CM

## 2019-03-26 DIAGNOSIS — I25.10: ICD-10-CM

## 2019-03-26 DIAGNOSIS — Z95.5: ICD-10-CM

## 2019-03-26 DIAGNOSIS — N39.0: ICD-10-CM

## 2019-03-26 DIAGNOSIS — F32.9: ICD-10-CM

## 2019-03-26 DIAGNOSIS — I11.0: ICD-10-CM

## 2019-03-26 DIAGNOSIS — D50.9: ICD-10-CM

## 2019-03-26 DIAGNOSIS — J44.9: ICD-10-CM

## 2019-03-26 DIAGNOSIS — E53.8: ICD-10-CM

## 2019-03-26 DIAGNOSIS — I50.33: ICD-10-CM

## 2019-03-26 DIAGNOSIS — J96.02: Primary | ICD-10-CM

## 2019-03-26 DIAGNOSIS — F17.210: ICD-10-CM

## 2019-03-26 LAB
ALBUMIN SERPL-MCNC: 3.4 G/DL (ref 3.5–5)
ALBUMIN/GLOB SERPL: 1 {RATIO} (ref 1–2.1)
ALT SERPL-CCNC: 21 U/L (ref 9–52)
APTT BLD: 36.3 SECONDS (ref 25.6–37.1)
ARTERIAL BLOOD GAS O2 SAT: 100.2 % (ref 95–98)
ARTERIAL BLOOD GAS PCO2: 62 MM/HG (ref 35–45)
ARTERIAL BLOOD GAS TCO2: 29.1 MMOL/L (ref 22–28)
ARTERIAL PATENCY WRIST A: YES
AST SERPL-CCNC: 16 U/L (ref 14–36)
BASOPHILS # BLD AUTO: 0 K/UL (ref 0–0.2)
BASOPHILS NFR BLD: 0.3 % (ref 0–2)
BUN SERPL-MCNC: 24 MG/DL (ref 7–17)
CALCIUM SERPL-MCNC: 9.2 MG/DL (ref 8.4–10.2)
EOSINOPHIL # BLD AUTO: 0.3 K/UL (ref 0–0.7)
EOSINOPHIL NFR BLD: 4.2 % (ref 0–4)
ERYTHROCYTE [DISTWIDTH] IN BLOOD BY AUTOMATED COUNT: 17.5 % (ref 11.5–14.5)
GFR NON-AFRICAN AMERICAN: 47
HCO3 BLDA-SCNC: 23.7 MMOL/L (ref 21–28)
HGB BLD-MCNC: 9.8 G/DL (ref 12–16)
INHALED O2 CONCENTRATION: 28 %
INR PPP: 1.3
LYMPHOCYTES # BLD AUTO: 0.6 K/UL (ref 1–4.3)
LYMPHOCYTES NFR BLD AUTO: 10.1 % (ref 20–40)
MCH RBC QN AUTO: 28.4 PG (ref 27–31)
MCHC RBC AUTO-ENTMCNC: 31.4 G/DL (ref 33–37)
MCV RBC AUTO: 90.5 FL (ref 81–99)
MONOCYTES # BLD: 0.6 K/UL (ref 0–0.8)
MONOCYTES NFR BLD: 9.5 % (ref 0–10)
NEUTROPHILS # BLD: 4.7 K/UL (ref 1.8–7)
NEUTROPHILS NFR BLD AUTO: 75.9 % (ref 50–75)
NRBC BLD AUTO-RTO: 0 % (ref 0–0)
PH BLDA: 7.25 [PH] (ref 7.35–7.45)
PLATELET # BLD: 247 K/UL (ref 130–400)
PMV BLD AUTO: 8.4 FL (ref 7.2–11.7)
PO2 BLDA: 79 MM/HG (ref 80–100)
PROTHROMBIN TIME: 15 SECONDS (ref 9.8–13.1)
RBC # BLD AUTO: 3.45 MIL/UL (ref 3.8–5.2)
WBC # BLD AUTO: 6.2 K/UL (ref 4.8–10.8)

## 2019-03-26 NOTE — CP.PCM.HP
History of Present Illness





- History of Present Illness


History of Present Illness: 





88 y/o F with a PMH of COPD, CAD, CHF, AFib and chronic b/l chronic baseline b/l

leg dermatitis and swelling, was brought by her daughter due to confusion and 

generalized weakness. Daughter explains that pt was not able to stand up or walk

since yesterday morning. Pt has also started to say incoherent remarks since 

then. Daughter called Dr. pyle, pt's PCP, who recommended evaluation for 

possible CO2 narcosis. Pt lives alone, has a homemaker every day and daughter 

takes care of patient every day. Pt reporst unable to sleep for the last few 

nights, pt cannot identy any possible cause for her lack of sleep. 


--Pt reports feeling very weak and denies any type of pain. Pt denies fever, 

chills, cough, SOB, palpitations, nausea, vomiting or paresthesia. 


--Pt was recently discharged from TCU for management of b/l LE cellulitis which 

as per daughter and pt, it has improved remarkably. 





PMD: Dr Pyle





Allergies: Penicillins


Meds: Lasix 40mg PO daily, Crestor 20mg PO, Bisoprolol 5mg PO daily, Lisinopril 

20mg PO daily, Elliquis 5mg PO BID, Imdur ER 30mg PO daily, Cardizem 120mg PO 

daily. 





PMHx: COPD, CAD, CHF, paroxysmal AFib and chronic b/l leg dermatitis 


PSHx: Cardiac stent placement 2 years ago. 


SHx: Stopped tobacco 15 years ago, used to smoke 1.5 ppdfor >50 years. No 

alcohol and no rec drugs. 








ED Course: 


--ABG showed a pCO2 of 62-high


--Head CT iwth unremarkable preliminary results. 


--High flow O2 was initiated. 








Present on Admission





- Present on Admission


Any Indicators Present on Admission: No





Review of Systems





- Constitutional


Constitutional: absent: Anorexia, Chills, Fever





- EENT


Eyes: absent: Change in Vision


Nose/Mouth/Throat: absent: Sore Throat, Neck Mass





- Cardiovascular


Cardiovascular: absent: Chest Pain, Dyspnea, Palpitations





- Respiratory


Respiratory: absent: Cough, Dyspnea, Hemoptysis





- Gastrointestinal


Gastrointestinal: absent: Abdominal Pain, Diarrhea, Nausea, Vomiting





- Genitourinary


Genitourinary: absent: Dysuria, Flank Pain, Hematuria





Past Patient History





- Infectious Disease


Hx of Infectious Diseases: None





- Tetanus Immunizations


Tetanus Immunization: Unknown





- Past Medical History & Family History


Past Medical History?: Yes





- Past Social History


Smoking Status: Former Smoker





- CARDIAC


Hx Atrial Fibrillation: Yes


Hx Cardia Arrhythmia: Yes


Hx Congestive Heart Failure: Yes


Hx Hypercholesterolemia: Yes


Hx Hypertension: Yes


Hx Pacemaker: No


Hx Peripheral Edema: Yes





- PULMONARY


Hx Chronic Obstructive Pulmonary Disease (COPD): Yes


Hx Emphysema: Yes


Hx Pneumonia: Yes





- NEUROLOGICAL


Hx Neurological Disorder: Yes


Hx Syncope: Yes


Other/Comment: AMS on recent admission.





- HEENT


Hx HEENT Problems: No





- RENAL


Hx Chronic Kidney Disease: No





- ENDOCRINE/METABOLIC


Hx Endocrine Disorders: Yes


Other/Comment: Multinodular thyroid.





- HEMATOLOGICAL/ONCOLOGICAL


Hx Anemia: Yes


Hx Human Immunodeficiency Virus (HIV): No





- INTEGUMENTARY


Hx Dermatological Problems: Yes


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





- MUSCULOSKELETAL/RHEUMATOLOGICAL


Hx Fractures: Yes (right ankle)





- GASTROINTESTINAL


Hx Gastrointestinal Disorders: No





- GENITOURINARY/GYNECOLOGICAL


Hx Incontinence: Yes


Other/Comment: Stress Incontinence





- PSYCHIATRIC


Hx Psychophysiologic Disorder: Yes


Hx Substance Use: No


Other/Comment: Depressed mood





- SURGICAL HISTORY


Hx Appendectomy: No


Hx Coronary Stent: Yes (x 2)





- ANESTHESIA


Hx Anesthesia: Yes


Hx Anesthesia Reactions: No


Hx Malignant Hyperthermia: No





Meds


Allergies/Adverse Reactions: 


                                    Allergies











Allergy/AdvReac Type Severity Reaction Status Date / Time


 


Penicillins Allergy Mild SWELLING Verified 03/08/19 15:40


 


vancomycin Allergy  RASH Verified 03/08/19 15:40














Physical Exam





- Constitutional


Appears: Well, No Acute Distress





- Head Exam


Head Exam: ATRAUMATIC, NORMAL INSPECTION





- Eye Exam


Eye Exam: EOMI, Normal appearance





- ENT Exam


ENT Exam: Mucous Membranes Moist





- Neck Exam


Neck exam: Positive for: Full Rom, Normal Inspection.  Negative for: Meningismus





- Respiratory Exam


Respiratory Exam: Decreased Breath Sounds, NORMAL BREATHING PATTERN.  absent: 

Rhonchi, Wheezes





- Cardiovascular Exam


Cardiovascular Exam: REGULAR RHYTHM, +S1, +S2





- GI/Abdominal Exam


GI & Abdominal Exam: Soft.  absent: Distended, Guarding, Rebound, Tenderness





- Extremities Exam


Extremities exam: Positive for: full ROM, pedal edema.  Negative for: calf tend

erness


Additional comments: 


Presence of diffuse erythema and mild edema on b/l LE from toes up to knees. Pt 

able to move toes and lift both of her legs. 





- Neurological Exam


Neurological exam: Alert


Additional comments: 


Pt only oriented to person and place. 





- Expanded Neurological Exam


  ** Expanded


Patient oriented to: person, place


Speech: Fluid Speech


Cranial nerves: EOM's Intact: Normal, Facial Palsey w/Forehead Movement: Normal,

Facial Palsey w/o Forehead Movement: Normal, Facial Sensation: Normal, 

Nystagmus: Normal, Tongue Deviation: Normal


Sensory exam: Lower Extremity Light Touch: Normal, Upper Extremity Light Touch: 

Normal


Neuro motor strength exam: Left Upper Extremity: 4, Right Upper Extremity: 4, 

Left Lower Extremity: 4, Right Lower Extremity: 4


Coma Scale Eye Opening: SPONTANEOUS


Coma Scale Motor Response: OBEYS COMMANDS


Coma Scale Verbal: Inappropriate


Coma Scale Total: 13





Results





- Vital Signs


Recent Vital Signs: 





                                Last Vital Signs











Temp  97.7 F   03/26/19 18:12


 


Pulse  60   03/26/19 18:43


 


Resp  18   03/26/19 19:45


 


BP  124/46 L  03/26/19 18:12


 


Pulse Ox  76 L  03/26/19 22:03














- Labs


Result Diagrams: 


                                 03/26/19 18:40





                                 03/26/19 18:40


Labs: 





                         Laboratory Results - last 24 hr











  03/26/19 03/26/19 03/26/19





  18:37 18:40 18:40


 


WBC    6.2


 


RBC    3.45 L


 


Hgb    9.8 L


 


Hct    31.2 L


 


MCV    90.5  D


 


MCH    28.4


 


MCHC    31.4 L


 


RDW    17.5 H


 


Plt Count    247  D


 


MPV    8.4


 


Neut % (Auto)    75.9 H


 


Lymph % (Auto)    10.1 L


 


Mono % (Auto)    9.5


 


Eos % (Auto)    4.2 H


 


Baso % (Auto)    0.3


 


Neut # (Auto)    4.7


 


Lymph # (Auto)    0.6 L


 


Mono # (Auto)    0.6


 


Eos # (Auto)    0.3


 


Baso # (Auto)    0.0


 


PT   


 


INR   


 


APTT   


 


pCO2  62 H  


 


pO2  79 L  


 


HCO3  23.7  


 


ABG pH  7.25 L  


 


ABG Total CO2  29.1 H  


 


ABG O2 Saturation  100.2 H  


 


ABG Base Excess  -1.4  


 


Chito Test  Yes  


 


ABG Potassium  4.3  


 


A-a O2 Difference  43.0  


 


Sodium  140.0  142 


 


Chloride  112.0 H  108 H 


 


Glucose  173 H  


 


Lactate  1.0  


 


Vent Mode  2lnc  


 


FiO2  28.0  


 


Potassium   4.4 


 


Carbon Dioxide   27 


 


Anion Gap   11 


 


BUN   24 H 


 


Creatinine   1.1 


 


Est GFR ( Amer)   57 


 


Est GFR (Non-Af Amer)   47 


 


Random Glucose   162 H 


 


Calcium   9.2 


 


Phosphorus   3.9 


 


Magnesium   1.9 


 


Total Bilirubin   0.3 


 


AST   16 


 


ALT   21 


 


Alkaline Phosphatase   109 


 


Troponin I   < 0.0120 


 


Total Protein   6.9 


 


Albumin   3.4 L 


 


Globulin   3.5 


 


Albumin/Globulin Ratio   1.0 


 


Arterial Blood Potassium  4.3  


 


Alcohol, Quantitative   < 10 














  03/26/19





  18:40


 


WBC 


 


RBC 


 


Hgb 


 


Hct 


 


MCV 


 


MCH 


 


MCHC 


 


RDW 


 


Plt Count 


 


MPV 


 


Neut % (Auto) 


 


Lymph % (Auto) 


 


Mono % (Auto) 


 


Eos % (Auto) 


 


Baso % (Auto) 


 


Neut # (Auto) 


 


Lymph # (Auto) 


 


Mono # (Auto) 


 


Eos # (Auto) 


 


Baso # (Auto) 


 


PT  15.0 H


 


INR  1.3


 


APTT  36.3


 


pCO2 


 


pO2 


 


HCO3 


 


ABG pH 


 


ABG Total CO2 


 


ABG O2 Saturation 


 


ABG Base Excess 


 


Chito Test 


 


ABG Potassium 


 


A-a O2 Difference 


 


Sodium 


 


Chloride 


 


Glucose 


 


Lactate 


 


Vent Mode 


 


FiO2 


 


Potassium 


 


Carbon Dioxide 


 


Anion Gap 


 


BUN 


 


Creatinine 


 


Est GFR ( Amer) 


 


Est GFR (Non-Af Amer) 


 


Random Glucose 


 


Calcium 


 


Phosphorus 


 


Magnesium 


 


Total Bilirubin 


 


AST 


 


ALT 


 


Alkaline Phosphatase 


 


Troponin I 


 


Total Protein 


 


Albumin 


 


Globulin 


 


Albumin/Globulin Ratio 


 


Arterial Blood Potassium 


 


Alcohol, Quantitative 














Assessment & Plan





- Assessment and Plan (Free Text)


Assessment: 





88 y/o F with a PMH of COPD, CAD, CHF, AFib and chronic b/l chronic baseline b/l

leg dermatitis and swelling, is admitted for evaluation and management of 

altered mental status and generalized weakness. 





PLAN: 





>Altered Mental Status


--Afebrile and stable


--Possibly due to CO2 narcosis vs delirium from lack of sleep. 


--Head CT with unremarkable results. 


--Pt needs constant care at all time. 





>Hypercapnia/CO2 narcosis


--Continue with High Flow O2. 


--Repeat ABG. 





>Wekaness/Deconditioning


--PT consult, eval and treat


--Pt needs constant care at all time. 





>Bilateral  Lower extremity Stasis dermatitis 


--Improving, S/P recent cellulitis treatment


--Chronic stasis dermatitis , aggravated by edema.





>COPD


--On Oxygen by NC at home.


--C/w High Flow O2. 


--Pulmonology consult, Dr Pyle, recomms appreciated 


--Home meds resumed





>Chronic CHF exacerbation diastolic dysfunction- preserved EF


--Last echocardiogram on 02/04/19: LVEF 50-55%.


--Home meds resumed: PO Lasix, Lisinopril, Imdur and Bisoprolol





>Paroxysmal Afib 


--Rate controlled


--Home meds resumed: Cardizem, Bisoprolol and Eliquis





>DVT prophylaxis


--On Eliquis BID





>Diet


--Heart healthy diet 








Case discussed with Dr Juan Carlos Boyd PGY-2

## 2019-03-26 NOTE — ED PDOC
- Laboratory Results


Result Diagrams: 


                                 19 18:40





                                 19 18:40


Lab Results: 





                                        











pCO2  62 mm/Hg (35-45)  H  19  18:37    


 


pO2  79 mm/Hg ()  L  19  18:37    


 


HCO3  23.7 mmol/L (21-28)   19  18:37    


 


ABG pH  7.25  (7.35-7.45)  L  19  18:37    


 


ABG Total CO2  29.1 mmol/L (22-28)  H  19  18:37    


 


ABG O2 Saturation  100.2 % (95-98)  H  19  18:37    


 


ABG Base Excess  -1.4 mmol/L (-2.0-3.0)   19  18:37    


 


Chito Test  Yes   19  18:37    


 


ABG Potassium  4.3 mmol/L (3.6-5.2)   19  18:37    


 


A-a O2 Difference  43.0 mm/Hg  19  18:37    


 


Sodium  140.0 mmol/L (132-148)   19  18:37    


 


Chloride  112.0 mmol/L ()  H  19  18:37    


 


Glucose  173 mg/dL ()  H  19  18:37    


 


Lactate  1.0 mmol/L (0.7-2.1)   19  18:37    


 


Vent Mode  2lnc   19  18:37    


 


FiO2  28.0 %  19  18:37    








                                        











PT  15.0 Seconds (9.8-13.1)  H  19  18:40    


 


INR  1.3   19  18:40    


 


APTT  36.3 Seconds (25.6-37.1)   19  18:40    








                                        











Troponin I  < 0.0120 ng/mL (0.00-0.120)   19  18:40    








                                        











Total Bilirubin  0.3 mg/dl (0.2-1.3)   19  18:40    


 


AST  16 U/L (14-36)   19  18:40    


 


ALT  21 U/L (9-52)   19  18:40    


 


Alkaline Phosphatase  109 U/L ()   19  18:40    


 


Total Protein  6.9 G/DL (6.3-8.2)   19  18:40    


 


Albumin  3.4 g/dL (3.5-5.0)  L  19  18:40    


 


Globulin  3.5 gm/dL (2.2-3.9)   19  18:40    


 


Albumin/Globulin Ratio  1.0  (1.0-2.1)   19  18:40    














- ECG


O2 Sat by Pulse Oximetry: 76 (RA)


Pulse Ox Interpretation: Normal





Medical Decision Making


Medical Decision Makin:10 


Patient signed out to this provider by Dr Lyon pending lab results. Once 

resulted, will consult Dr. Pyle, patient's PMD and likely admit due to AMS. 





21:24


CT Head 


FINDINGS: 





BRAIN 


Chronic periventricular and subcortical microvascular disease is seen. 





VENTRICLES: 


There is generalized parenchymal atrophy noted as demonstrated by symmetrical 

dilatation of ventricles and sulci. 





ORBITS: 


The orbits are unremarkable. 





SINUSES AND MASTOIDS: 


The paranasal sinuses and mastoid air cells are clear. 





BONES: 


No fracture. 





SOFT TISSUES: 


Unremarkable. 





MISCELLANEOUS: 


No acute intracranial pathology. 





IMPRESSION: 





1. Generalized parenchymal atrophy. 


2. Chronic periventricular and subcortical microvascular disease. 


3. No acute intracranial pathology.





21:27 


Spoke to both Dr. Pyle and Dr. Rangel who are aware and agreed to admit patient. 

Patient will be admitted to Dr. Rangel who admits for PMD, Dr. Pyle, for further 

workup of AMS. 





-----------------------

--------------------------------------------------------------------------


Scribe Attestation:


Documented by Steff August, acting as a scribe Sheldon Iraheta MD 





Provider Scribe Attestation:


All medical record entries made by the Scribe were at my direction and 

personally dictated by me. I have reviewed the chart and agree that the record 

accurately reflects my personal performance of the history, physical exam, 

medical decision making, and the department course for this patient. I have also

personally directed, reviewed, and agree with the discharge instructions and 

disposition





Disposition


Counseled Patient/Family Regarding: Studies Performed, Diagnosis





- Clinical Impression


Clinical Impression: 


 Altered mental status








- POA


Present On Arrival: None





- Disposition


Disposition: Admitted as In-Patient (2)


Disposition Time: 21:27


Condition: FAIR

## 2019-03-26 NOTE — ED PDOC
HPI: Altered Mental Status


Time Seen by Provider: 03/26/19 18:17


Chief Complaint (Nursing): Altered Mental Status


Chief Complaint (Provider): AMS


History Per: Family


Onset/Duration Of Symptoms: Days


Current Symptoms Are (Timing): Still Present


Description Of Symptoms: Confused


Usual Baseline: Alert Oriented


Additional Complaint(s): 


88yo female, history of COPD, O2 dependence (4L O2 24 hrs a day), recently 

admitted for lower extremity cellulitis, brought to ER for evaluation due to 

confusion. Per family, patient is alert and oriented x 2 to self and place, 

however hallucinatory statements are not her baseline. Family called PMD Dr. pyle, who recommended evaluation for possible CO2 narcosis. Daughter 

additionally states the lower extremity is improving although it remains red, 

states they "look less angry." No additional complaints.





PMD: Dr. Pyle





Past Medical History


Reviewed: Historical Data, Nursing Documentation, Vital Signs


Vital Signs: 





                                Last Vital Signs











Temp  97.7 F   03/26/19 18:12


 


Pulse  60   03/26/19 18:43


 


Resp  20   03/26/19 18:43


 


BP  124/46 L  03/26/19 18:12


 


Pulse Ox  76 L  03/26/19 18:43














- Medical History


PMH: Anemia, Atrial Fibrillation, CAD, Cardia Arrhythmia, CHF, COPD, Emphysema, 

Fractures (right ankle), HTN, Hypercholesterolemia, Peripheral Edema, Pneumonia


   Denies: Diabetes, HIV, Chronic Kidney Disease





- Surgical History


Surgical History: Coronary Stent (x 2)


   Denies: Appendectomy, Pacemaker





- Family History


Family History: States: Unknown Family Hx





- Living Arrangements


Living Arrangements: With Family





- Social History


Current smoker - smoking cessation education provided: No





- Home Medications


Home Medications: 


                                Ambulatory Orders











 Medication  Instructions  Recorded


 


Apixaban [Eliquis] 5 mg PO BID #60 tab 02/19/19


 


Isosorbide Mononitrate ER [Imdur 30 mg PO DAILY #30 tab 02/19/19





ER]  


 


Rosuvastatin Calcium [Crestor] 20 mg PO HS 03/02/19


 


Albuterol/Ipratropium [Duoneb 3 3 ml INH RQ6 PRN  neb 03/06/19





mg/0.5 mg (3 ml) UD]  


 


Lisinopril [Zestril] 10 mg PO DAILY #30 tab 03/18/19


 


acetaZOLAMIDE [Diamox 250 mg Tab] 250 mg PO DAILY #30 tab 03/18/19


 


Bisoprolol [Zebeta] 2.5 mg PO DAILY 03/26/19


 


Escitalopram [Lexapro] 5 mg PO DAILY 03/26/19


 


Umeclidinium Brm/Vilanterol Tr 1 puff IH DAILY 03/26/19





[Anoro Ellipta 62.5-25 Mcg INH]  














- Allergies


Allergies/Adverse Reactions: 


                                    Allergies











Allergy/AdvReac Type Severity Reaction Status Date / Time


 


Penicillins Allergy Mild SWELLING Verified 03/08/19 15:40


 


vancomycin Allergy  RASH Verified 03/08/19 15:40














Review of Systems


ROS Statement: Except As Marked, All Systems Reviewed And Found Negative


Constitutional: Negative for: Fever, Chills


Neurological: Positive for: Altered Mental Status





Physical Exam





- Reviewed


Nursing Documentation Reviewed: Yes


Vital Signs Reviewed: Yes





- Physical Exam


Appears: Positive for: Non-toxic


Cardiovascular/Chest: Positive for: Regular Rate, Rhythm.  Negative for: 

Tachycardia


Respiratory: Positive for: Decreased Breath Sounds (bilateral diminished breath 

sounds)


Extremity: Positive for: Other (chronic appearing erythema and scaling skin to 

bilateral lower extremity)


Neurological/Psych: Positive for: Alert (alert and oriented x 2 - person and 

place; occasional periods of delirium), Symmetric/Intact Strength (4/5 strength 

upper extremities; 3/5 strength lower extremities)





- Laboratory Results


Result Diagrams: 


                                 03/28/19 05:25





                                 03/28/19 05:25


Lab Results: 





                                        











pCO2  62 mm/Hg (35-45)  H  03/26/19  18:37    


 


pO2  79 mm/Hg ()  L  03/26/19  18:37    


 


HCO3  23.7 mmol/L (21-28)   03/26/19  18:37    


 


ABG pH  7.25  (7.35-7.45)  L  03/26/19  18:37    


 


ABG Total CO2  29.1 mmol/L (22-28)  H  03/26/19  18:37    


 


ABG O2 Saturation  100.2 % (95-98)  H  03/26/19  18:37    


 


ABG Base Excess  -1.4 mmol/L (-2.0-3.0)   03/26/19  18:37    


 


Chito Test  Yes   03/26/19  18:37    


 


ABG Potassium  4.3 mmol/L (3.6-5.2)   03/26/19  18:37    


 


A-a O2 Difference  43.0 mm/Hg  03/26/19  18:37    


 


Sodium  140.0 mmol/L (132-148)   03/26/19  18:37    


 


Chloride  112.0 mmol/L ()  H  03/26/19  18:37    


 


Glucose  173 mg/dL ()  H  03/26/19  18:37    


 


Lactate  1.0 mmol/L (0.7-2.1)   03/26/19  18:37    


 


Vent Mode  2lnc   03/26/19  18:37    


 


FiO2  28.0 %  03/26/19  18:37    








                                        











PT  15.0 Seconds (9.8-13.1)  H  03/26/19  18:40    


 


INR  1.3   03/26/19  18:40    


 


APTT  36.3 Seconds (25.6-37.1)   03/26/19  18:40    














- ECG


O2 Sat by Pulse Oximetry: 76





Medical Decision Making


Medical Decision Making: 


ABG obtained, CO2 level 62 - mildly elevated but not significantly


Will obtain CT Head w/o contrast





1910


Patient placed on vapotherm


Patient signed out to Dr. Iraheta pending CT head, labs, reassessment.





---------------------------------------------------------------


Scribe Attestation:


Documented by Zenaida Davis, acting as a scribe for Alcon Lyon DO


 


Provider Scribe Attestation:


All medical record entries made by the Scribe were at my direction and 

personally dictated by me. I have reviewed the chart and agree that the record 

accurately reflects my personal performance of the history, physical exam, 

medical decision making, and the department course for this patient. I have also

personally directed, reviewed, and agree with the discharge instructions and 

disposition.


 








Disposition





- Clinical Impression


Clinical Impression: 


 Altered mental status








- Patient ED Disposition


Is Patient to be Admitted: Transfer of Care


Counseled Patient/Family Regarding: Studies Performed, Diagnosis, Need For 

Followup





- Disposition


Disposition: Transfer of Care


Disposition Time: 19:10


Condition: FAIR


Patient Signed Over To: Dalton Iraheta

## 2019-03-27 LAB
ARTERIAL BLOOD GAS HEMOGLOBIN: 8.6 G/DL (ref 11.7–17.4)
ARTERIAL BLOOD GAS HEMOGLOBIN: 9.8 G/DL (ref 11.7–17.4)
ARTERIAL BLOOD GAS HEMOGLOBIN: 9.9 G/DL (ref 11.7–17.4)
ARTERIAL BLOOD GAS O2 SAT: 100.9 % (ref 95–98)
ARTERIAL BLOOD GAS O2 SAT: 99.2 % (ref 95–98)
ARTERIAL BLOOD GAS O2 SAT: 99.7 % (ref 95–98)
ARTERIAL BLOOD GAS PCO2: 55 MM/HG (ref 35–45)
ARTERIAL BLOOD GAS PCO2: 66 MM/HG (ref 35–45)
ARTERIAL BLOOD GAS PCO2: 67 MM/HG (ref 35–45)
ARTERIAL BLOOD GAS TCO2: 30 MMOL/L (ref 22–28)
ARTERIAL BLOOD GAS TCO2: 30.9 MMOL/L (ref 22–28)
ARTERIAL BLOOD GAS TCO2: 32.2 MMOL/L (ref 22–28)
ARTERIAL PATENCY WRIST A: YES
HCO3 BLDA-SCNC: 25.5 MMOL/L (ref 21–28)
HCO3 BLDA-SCNC: 26.3 MMOL/L (ref 21–28)
HCO3 BLDA-SCNC: 26.4 MMOL/L (ref 21–28)
INHALED O2 CONCENTRATION: 30 %
INHALED O2 CONCENTRATION: 40 %
INHALED O2 CONCENTRATION: 80 %
O2 CAP BLDA-SCNC: 12 ML/DL (ref 16–24)
O2 CAP BLDA-SCNC: 13.5 ML/DL (ref 16–24)
O2 CAP BLDA-SCNC: 13.5 ML/DL (ref 16–24)
O2 CT BLDA-SCNC: 12.1 ML/DL (ref 15–23)
O2 CT BLDA-SCNC: 13.4 ML/DL (ref 15–23)
O2 CT BLDA-SCNC: 13.5 ML/DL (ref 15–23)
PH BLDA: 7.25 [PH] (ref 7.35–7.45)
PH BLDA: 7.26 [PH] (ref 7.35–7.45)
PH BLDA: 7.32 [PH] (ref 7.35–7.45)
PO2 BLDA: 109 MM/HG (ref 80–100)
PO2 BLDA: 122 MM/HG (ref 80–100)
PO2 BLDA: 96 MM/HG (ref 80–100)

## 2019-03-27 RX ADMIN — IPRATROPIUM BROMIDE AND ALBUTEROL SULFATE SCH ML: .5; 3 SOLUTION RESPIRATORY (INHALATION) at 19:32

## 2019-03-27 RX ADMIN — IPRATROPIUM BROMIDE AND ALBUTEROL SULFATE SCH ML: .5; 3 SOLUTION RESPIRATORY (INHALATION) at 11:16

## 2019-03-27 RX ADMIN — IPRATROPIUM BROMIDE AND ALBUTEROL SULFATE SCH ML: .5; 3 SOLUTION RESPIRATORY (INHALATION) at 15:49

## 2019-03-27 NOTE — CARD
--------------- APPROVED REPORT --------------





Date of service: 03/26/2019



EKG Measurement

Heart [id number]

FL [id number]

[id number]

[id number]

[id number]



<Conclusion>

Sinus bradycardia

Otherwise normal ECG

## 2019-03-27 NOTE — RAD
Date of service: 



03/26/2019



HISTORY:

 SOB 



COMPARISON:

3/12/2019 



TECHNIQUE:

1 view obtained.



FINDINGS:



LUNGS:

No active pulmonary disease.



PLEURA:

No significant pleural effusion identified, no pneumothorax apparent.



CARDIOVASCULAR:

No aortic atherosclerotic calcification present.



Normal cardiac size. No pulmonary vascular congestion. 



OSSEOUS STRUCTURES:

No significant abnormalities.



VISUALIZED UPPER ABDOMEN:

Normal.



OTHER FINDINGS:

None.



IMPRESSION:

No active disease.

## 2019-03-27 NOTE — CT
Date of service: 



03/26/2019



PROCEDURE:  CT HEAD WITHOUT CONTRAST.



HISTORY:

r/o ICH



COMPARISON:

2/2/2019



TECHNIQUE:

Axial computed tomography images were obtained through the head/brain 

without intravenous contrast.  



Radiation dose:



Total exam DLP = 872.99 mGy-cm.



This CT exam was performed using one or more of the following dose 

reduction techniques: Automated exposure control, adjustment of the 

mA and/or kV according to patient size, and/or use of iterative 

reconstruction technique.



FINDINGS:



HEMORRHAGE:

No intracranial hemorrhage. 



BRAIN:

No mass effect or edema.  Generalized cerebral atrophy with bilateral 

subcortical and deep white matter hypo densities without mass 

effect-an appearance compatible with chronic microvascular disease 

changes.  All findings are stable appearing



VENTRICLES:

Bilateral prominence commensurate with the degree of atrophy.  



CALVARIUM:

Unremarkable.



PARANASAL SINUSES:

Unremarkable as visualized. No significant inflammatory changes.



MASTOID AIR CELLS:

Unremarkable as visualized. No inflammatory changes.



OTHER FINDINGS:

None.



IMPRESSION:

Cerebral atrophy [id number].  Chronic appearing 

periventricular and and subcortical deep white matter microvascular 

disease.  Also similar appearing 



No acute intracranial pathology appreciated 



Concordant results (preliminary interpretation) provided by [facility].

## 2019-03-27 NOTE — CP.PCM.PN
<Jordy Son - Last Filed: 03/27/19 13:39>





Subjective





- Date & Time of Evaluation


Date of Evaluation: 03/27/19


Time of Evaluation: 08:00





- Subjective


Subjective: 





Patient seen and examined at bedside. No acute event overnight as per nurse. 

Patient still confused and drowsy. she is not alert to time or place or self. 

Patient is feeding, but she stay a sleep while eating. Patient is currently on 

bipap





Objective





- Vital Signs/Intake and Output


Vital Signs (last 24 hours): 


                                        











Temp Pulse Resp BP Pulse Ox


 


 98.3 F   72   20   113/65   99 


 


 03/27/19 08:19  03/27/19 08:19  03/27/19 08:19  03/27/19 08:19  03/27/19 08:19











- Medications


Medications: 


                               Current Medications





Acetazolamide (Diamox 250 Mg Tab)  250 mg PO DAILY [facility]


Albuterol/Ipratropium (Duoneb 3 Mg/0.5 Mg (3 Ml) Ud)  3 ml INH RQ6 PRN


   PRN Reason: Shortness of Breath


Apixaban (Eliquis)  5 mg PO BID [facility]; Protocol


Atorvastatin Calcium (Lipitor)  40 mg PO HS [facility]


Bisoprolol Fumarate (Zebeta)  2.5 mg PO DAILY [facility]


Escitalopram Oxalate (Lexapro)  5 mg PO DAILY [facility]


Home Med (Umeclidinium Brm/Vilanterol Tr [Anoro Ellipta 62.5-25 Mcg Inh])  1 

puff IH DAILY [facility]


Isosorbide Mononitrate (Imdur Er)  30 mg PO DAILY [facility]


Lisinopril (Zestril)  10 mg PO DAILY [facility]


Nystatin (Mycostatin Cream)  1 applic TOP TID [facility]











- Labs


Labs: 


                                        





                                 03/26/19 18:40 





                                 03/26/19 18:40 





                                        











PT  15.0 Seconds (9.8-13.1)  H  03/26/19  18:40    


 


INR  1.3   03/26/19  18:40    


 


APTT  36.3 Seconds (25.6-37.1)   03/26/19  18:40    














- Constitutional


Appears: Well, Non-toxic, No Acute Distress





- Head Exam


Head Exam: ATRAUMATIC, NORMAL INSPECTION, NORMOCEPHALIC





- Eye Exam


Eye Exam: EOMI, Normal appearance, PERRL


Pupil Exam: NORMAL ACCOMODATION, PERRL





- ENT Exam


ENT Exam: Mucous Membranes Moist, Normal Exam





- Neck Exam


Neck Exam: Full ROM, Normal Inspection





- Respiratory Exam


Respiratory Exam: Decreased Breath Sounds, NORMAL BREATHING PATTERN





- Cardiovascular Exam


Cardiovascular Exam: REGULAR RHYTHM, +S1, +S2





- GI/Abdominal Exam


GI & Abdominal Exam: Soft, Normal Bowel Sounds





- Extremities Exam


Extremities Exam: Full ROM, Normal Capillary Refill, Normal Inspection, Pedal 

Edema


Additional comments: 








Presence of diffuse erythema and mild edema on b/l LE from toes up to knees. 





- Back Exam


Back Exam: NORMAL INSPECTION





- Neurological Exam


Neurological Exam: Altered


Additional comments: 





Not oriented to time or place 





- Skin


Skin Exam: Dry, Intact, Normal Color, Warm





Assessment and Plan





- Assessment and Plan (Free Text)


Assessment: 





86 yo female with PMH of COPD, CAD, CHF, AFIB and chronic B/L chronic baseline 

B/L leg dermatitis and swelling, admitted for evaluation and management of 

altered mental status and generalized weakness








PLAN: 





Altered Mental Status


Afebrile and stable


CO2 67H, PH 7.26. Ox 99.7 


Bipap Rate 21, , Ve11.7 PIP 13


Possibly due to CO2 narcosis vs delirium from lack of sleep. 


Head CT with unremarkable results. 


Pt needs constant care at all time. 


Pulm consulted follow up recommendation





Hypercapnia/CO2 narcosis


Continue with High Flow O2. 





Wekaness/Deconditioning-


PT consult, eval and treat


Pt needs constant care at all time. 





Bilateral  Lower extremity Stasis dermatitis 


Improving, S/P recent cellulitis treatment


Chronic stasis dermatitis , aggravated by edema.





COPD


On Oxygen by NC at home.


continue High Flow O2. 


Pulmonology consult, Dr Pyle, recomms appreciated 


Home meds resumed





Chronic CHF exacerbation diastolic dysfunction- preserved EF


Last echocardiogram on 02/04/19: LVEF 50-55%.


Home meds resumed: PO Lasix, Lisinopril, Imdur and Bisoprolol





Paroxysmal Afib 


Rate controlled


Home meds resumed: Cardizem, Bisoprolol and Eliquis





DVT prophylaxis


On Eliquis BID





Diet


Heart healthy diet 








<Madisyn Merrill - Last Filed: 03/27/19 16:15>





Objective





- Vital Signs/Intake and Output


Vital Signs (last 24 hours): 


                                        











Temp Pulse Resp BP Pulse Ox


 


 98.3 F   77   20   120/78   99 


 


 03/27/19 08:19  03/27/19 11:34  03/27/19 08:19  03/27/19 10:51  03/27/19 08:19











- Medications


Medications: 


                               Current Medications





Acetazolamide (Diamox 250 Mg Tab)  250 mg PO DAILY [facility]


   Last Admin: 03/27/19 10:50 Dose:  250 mg


Albuterol/Ipratropium (Duoneb 3 Mg/0.5 Mg (3 Ml) Ud)  3 ml INH RQID [facility]


   Last Admin: 03/27/19 11:16 Dose:  3 ml


Apixaban (Eliquis)  5 mg PO BID [facility]; Protocol


   Last Admin: 03/27/19 10:50 Dose:  5 mg


Atorvastatin Calcium (Lipitor)  40 mg PO HS [facility]


Bisoprolol Fumarate (Zebeta)  2.5 mg PO DAILY [facility]


   Last Admin: 03/27/19 10:51 Dose:  2.5 mg


Escitalopram Oxalate (Lexapro)  5 mg PO DAILY [facility]


   Last Admin: 03/27/19 10:51 Dose:  5 mg


Home Med (Umeclidinium Brm/Vilanterol Tr [Anoro Ellipta 62.5-25 Mcg Inh])  1 

puff IH DAILY [facility]


Isosorbide Mononitrate (Imdur Er)  30 mg PO DAILY [facility]


   Last Admin: 03/27/19 10:51 Dose:  30 mg


Lisinopril (Zestril)  10 mg PO DAILY [facility]


   Last Admin: 03/27/19 10:51 Dose:  10 mg


Nystatin (Mycostatin Cream)  1 applic TOP TID [facility]


   Last Admin: 03/27/19 13:26 Dose:  1 applic











- Labs


Labs: 


                                        





                                 03/26/19 18:40 





                                 03/26/19 18:40 





                                        











PT  15.0 Seconds (9.8-13.1)  H  03/26/19  18:40    


 


INR  1.3   03/26/19  18:40    


 


APTT  36.3 Seconds (25.6-37.1)   03/26/19  18:40    














Attending/Attestation





- Attestation


I have personally seen and examined this patient.: Yes


I have fully participated in the care of the patient.: Yes


I have reviewed all pertinent clinical information, including history, physical 

exam and plan: Yes


Notes (Text): 











AMS likely due to CO2 Narcosis


Acute Hypercapneic Respiratory Failure


COPD , chronic


Paroxysmal A Fib, rate controlled


Chronic Stasis  Dermatitis 


HTN





- Pt is on Bipap - setting changed by Dr Pyle to 12/6/30%/16


- Acetazolamide started


- cont Eliquis 


- cont HTN meds


- cont Duoneb tx 


- Hold Lexapro

## 2019-03-27 NOTE — CP.PCM.CON
Past Patient History





- Infectious Disease


Hx of Infectious Diseases: None





- Tetanus Immunizations


Tetanus Immunization: Unknown





- Past Medical History & Family History


Past Medical History?: Yes





- Past Social History


Smoking Status: Heavy Smoker > 10 Cigarettes Daily





- CARDIAC


Hx Cardiac Disorders: Yes


Hx Atrial Fibrillation: Yes


Hx Cardia Arrhythmia: Yes


Hx Congestive Heart Failure: Yes


Hx Hypercholesterolemia: Yes


Hx Hypertension: Yes


Hx Pacemaker: No


Hx Peripheral Edema: Yes





- PULMONARY


Hx Respiratory Disorders: Yes


Hx Chronic Obstructive Pulmonary Disease (COPD): Yes


Hx Emphysema: Yes


Hx Pneumonia: Yes





- NEUROLOGICAL


Hx Neurological Disorder: Yes





- HEENT


Hx HEENT Problems: No





- RENAL


Hx Chronic Kidney Disease: No





- ENDOCRINE/METABOLIC


Hx Endocrine Disorders: Yes





- HEMATOLOGICAL/ONCOLOGICAL


Hx Blood Disorders: Yes


Hx Anemia: Yes


Hx Human Immunodeficiency Virus (HIV): No





- INTEGUMENTARY


Hx Dermatological Problems: Yes


Hx Cellulitis: Yes





- MUSCULOSKELETAL/RHEUMATOLOGICAL


Hx Musculoskeletal Disorders: Yes


Hx Falls: Yes


Hx Fractures: Yes (right ankle)





- GASTROINTESTINAL


Hx Gastrointestinal Disorders: No





- GENITOURINARY/GYNECOLOGICAL


Hx Genitourinary Disorders: Yes


Hx Incontinence: Yes


Other/Comment: Stress Incontinence





- PSYCHIATRIC


Hx Psychophysiologic Disorder: Yes


Hx Substance Use: No





- SURGICAL HISTORY


Hx Surgeries: Yes


Hx Appendectomy: No


Hx Coronary Stent: Yes (x 2)





- ANESTHESIA


Hx Anesthesia: Yes


Hx Anesthesia Reactions: No


Hx Malignant Hyperthermia: No


Has any member of the family had a problem w/ anesthesia?: No





Meds


Allergies/Adverse Reactions: 


                                    Allergies











Allergy/AdvReac Type Severity Reaction Status Date / Time


 


Penicillins Allergy Mild SWELLING Verified 03/08/19 15:40


 


vancomycin Allergy  RASH Verified 03/08/19 15:40














- Medications


Medications: 


                               Current Medications





Acetazolamide (Diamox 250 Mg Tab)  250 mg PO DAILY [facility]


Albuterol/Ipratropium (Duoneb 3 Mg/0.5 Mg (3 Ml) Ud)  3 ml INH RQ6 PRN


   PRN Reason: Shortness of Breath


Apixaban (Eliquis)  5 mg PO BID [facility]; Protocol


Atorvastatin Calcium (Lipitor)  40 mg PO HS [facility]


Bisoprolol Fumarate (Zebeta)  2.5 mg PO DAILY [facility]


Escitalopram Oxalate (Lexapro)  5 mg PO DAILY [facility]


Home Med (Umeclidinium Brm/Vilanterol Tr [Anoro Ellipta 62.5-25 Mcg Inh])  1 

puff IH DAILY [facility]


Isosorbide Mononitrate (Imdur Er)  30 mg PO DAILY [facility]


Lisinopril (Zestril)  10 mg PO DAILY [facility]











Results





- Vital Signs


Recent Vital Signs: 


                                Last Vital Signs











Temp  98.3 F   03/27/19 08:19


 


Pulse  72   03/27/19 08:19


 


Resp  20   03/27/19 08:19


 


BP  113/65   03/27/19 08:19


 


Pulse Ox  99   03/27/19 08:19














- Labs


Result Diagrams: 


                                 03/26/19 18:40





                                 03/26/19 18:40


Labs: 


                         Laboratory Results - last 24 hr











  03/26/19 03/26/19 03/26/19





  18:37 18:40 18:40


 


WBC    6.2


 


RBC    3.45 L


 


Hgb    9.8 L


 


Hct    31.2 L


 


MCV    90.5  D


 


MCH    28.4


 


MCHC    31.4 L


 


RDW    17.5 H


 


Plt Count    247  D


 


MPV    8.4


 


Neut % (Auto)    75.9 H


 


Lymph % (Auto)    10.1 L


 


Mono % (Auto)    9.5


 


Eos % (Auto)    4.2 H


 


Baso % (Auto)    0.3


 


Neut # (Auto)    4.7


 


Lymph # (Auto)    0.6 L


 


Mono # (Auto)    0.6


 


Eos # (Auto)    0.3


 


Baso # (Auto)    0.0


 


PT   


 


INR   


 


APTT   


 


pCO2  62 H  


 


pO2  79 L  


 


HCO3  23.7  


 


ABG pH  7.25 L  


 


ABG Total CO2  29.1 H  


 


ABG O2 Saturation  100.2 H  


 


ABG O2 Content   


 


ABG Base Excess  -1.4  


 


ABG Hemoglobin   


 


ABG Carboxyhemoglobin   


 


POC ABG HHb (Measured)   


 


ABG Methemoglobin   


 


ABG O2 Capacity   


 


Chito Test  Yes  


 


ABG Potassium  4.3  


 


A-a O2 Difference  43.0  


 


Hgb O2 Saturation   


 


Sodium  140.0  142 


 


Chloride  112.0 H  108 H 


 


Glucose  173 H  


 


Lactate  1.0  


 


Liter Flow   


 


Vent Mode  2lnc  


 


Mechanical Rate   


 


FiO2  28.0  


 


Inspiratory BiPAP   


 


Expiratory BiPAP   


 


Potassium   4.4 


 


Carbon Dioxide   27 


 


Anion Gap   11 


 


BUN   24 H 


 


Creatinine   1.1 


 


Est GFR ( Amer)   57 


 


Est GFR (Non-Af Amer)   47 


 


Random Glucose   162 H 


 


Calcium   9.2 


 


Phosphorus   3.9 


 


Magnesium   1.9 


 


Total Bilirubin   0.3 


 


AST   16 


 


ALT   21 


 


Alkaline Phosphatase   109 


 


Troponin I   < 0.0120 


 


Total Protein   6.9 


 


Albumin   3.4 L 


 


Globulin   3.5 


 


Albumin/Globulin Ratio   1.0 


 


Arterial Blood Potassium  4.3  


 


Alcohol, Quantitative   < 10 














  03/26/19 03/27/19 03/27/19





  18:40 00:39 08:20


 


WBC   


 


RBC   


 


Hgb   


 


Hct   


 


MCV   


 


MCH   


 


MCHC   


 


RDW   


 


Plt Count   


 


MPV   


 


Neut % (Auto)   


 


Lymph % (Auto)   


 


Mono % (Auto)   


 


Eos % (Auto)   


 


Baso % (Auto)   


 


Neut # (Auto)   


 


Lymph # (Auto)   


 


Mono # (Auto)   


 


Eos # (Auto)   


 


Baso # (Auto)   


 


PT  15.0 H  


 


INR  1.3  


 


APTT  36.3  


 


pCO2   66 H  67 H


 


pO2   96  122 H


 


HCO3   25.5  26.4


 


ABG pH   7.25 L  7.26 L


 


ABG Total CO2   30.9 H  32.2 H


 


ABG O2 Saturation   99.2 H  99.7 H


 


ABG O2 Content   13.4 L  13.5 L


 


ABG Base Excess   0.7  1.9


 


ABG Hemoglobin   9.9 L  9.8 L


 


ABG Carboxyhemoglobin   0.8  0.8


 


POC ABG HHb (Measured)   0.8  0.3


 


ABG Methemoglobin   2.8  2.8


 


ABG O2 Capacity   13.5 L  13.5 L


 


Chito Test   Yes  Yes


 


ABG Potassium   


 


A-a O2 Difference   392.0  79.0


 


Hgb O2 Saturation   95.5  96.2


 


Sodium   


 


Chloride   


 


Glucose   


 


Lactate   


 


Liter Flow   25 


 


Vent Mode   High flow lpm  Bipap


 


Mechanical Rate    14


 


FiO2   80.0  40.0


 


Inspiratory BiPAP    12


 


Expiratory BiPAP    7


 


Potassium   


 


Carbon Dioxide   


 


Anion Gap   


 


BUN   


 


Creatinine   


 


Est GFR ( Amer)   


 


Est GFR (Non-Af Amer)   


 


Random Glucose   


 


Calcium   


 


Phosphorus   


 


Magnesium   


 


Total Bilirubin   


 


AST   


 


ALT   


 


Alkaline Phosphatase   


 


Troponin I   


 


Total Protein   


 


Albumin   


 


Globulin   


 


Albumin/Globulin Ratio   


 


Arterial Blood Potassium   


 


Alcohol, Quantitative   














Assessment & Plan


(1) Acute hypercapnic respiratory failure


Status: Acute   Priority: High   





(2) Altered mental state


Status: Acute   Priority: High   





(3) COPD (chronic obstructive pulmonary disease)


Status: Chronic   Priority: High   





- Date & Time


Date: 03/27/19


Time: 10:00

## 2019-03-28 VITALS — OXYGEN SATURATION: 95 %

## 2019-03-28 LAB
ALBUMIN SERPL-MCNC: 3.1 G/DL (ref 3.5–5)
ALBUMIN/GLOB SERPL: 1 {RATIO} (ref 1–2.1)
ALT SERPL-CCNC: 30 U/L (ref 9–52)
AST SERPL-CCNC: 12 U/L (ref 14–36)
BUN SERPL-MCNC: 24 MG/DL (ref 7–17)
CALCIUM SERPL-MCNC: 9.1 MG/DL (ref 8.4–10.2)
ERYTHROCYTE [DISTWIDTH] IN BLOOD BY AUTOMATED COUNT: 17.4 % (ref 11.5–14.5)
GFR NON-AFRICAN AMERICAN: 42
HGB BLD-MCNC: 9.1 G/DL (ref 12–16)
MCH RBC QN AUTO: 28 PG (ref 27–31)
MCHC RBC AUTO-ENTMCNC: 31.6 G/DL (ref 33–37)
MCV RBC AUTO: 88.7 FL (ref 81–99)
PLATELET # BLD: 229 K/UL (ref 130–400)
RBC # BLD AUTO: 3.24 MIL/UL (ref 3.8–5.2)
WBC # BLD AUTO: 5.9 K/UL (ref 4.8–10.8)

## 2019-03-28 RX ADMIN — IPRATROPIUM BROMIDE AND ALBUTEROL SULFATE SCH ML: .5; 3 SOLUTION RESPIRATORY (INHALATION) at 07:21

## 2019-03-28 RX ADMIN — IPRATROPIUM BROMIDE AND ALBUTEROL SULFATE SCH ML: .5; 3 SOLUTION RESPIRATORY (INHALATION) at 15:06

## 2019-03-28 RX ADMIN — IPRATROPIUM BROMIDE AND ALBUTEROL SULFATE SCH ML: .5; 3 SOLUTION RESPIRATORY (INHALATION) at 19:01

## 2019-03-28 RX ADMIN — IPRATROPIUM BROMIDE AND ALBUTEROL SULFATE SCH ML: .5; 3 SOLUTION RESPIRATORY (INHALATION) at 11:31

## 2019-03-28 NOTE — CP.PCM.PN
Subjective





- Date & Time of Evaluation


Date of Evaluation: 03/28/19


Time of Evaluation: 11:11





- Subjective


Subjective: 





Seen on rounds in the general medical floor.


The patient is presently ambulating with physical therapy on oxygen.


She appears much improved from the day prior and her mentation while not 

perfectly clear is much better than the day prior.  She had been on BiPAP mask 

ventilation for the entire day and night since last seen yesterday.


Breath sounds are markedly diminished bilaterally but are otherwise unremarkable

at this time.  It are no audible wheezes present.


Heart sounds are very distant but rhythm is regular.


There is 1+ dependent edema of both lower extremities with some erythema but no 

increased warmth present.





Patient will continue on the current regimen using BiPAP mask ventilation 

overnight only and remaining on low-flow nasal cannula during the daytime.  All 

other medications will remain unchanged at this time as well.





Objective





- Vital Signs/Intake and Output


Vital Signs (last 24 hours): 


                                        











Temp Pulse Resp BP Pulse Ox


 


 98.1 F   91 H  20   114/68   98 


 


 03/28/19 08:17  03/28/19 09:36  03/28/19 08:17  03/28/19 09:36  03/28/19 08:17











- Medications


Medications: 


                               Current Medications





Acetazolamide (Diamox 250 Mg Tab)  250 mg PO DAILY [facility]


   Last Admin: 03/28/19 09:37 Dose:  250 mg


Albuterol/Ipratropium (Duoneb 3 Mg/0.5 Mg (3 Ml) Ud)  3 ml INH RQID [facility]


   Last Admin: 03/28/19 07:21 Dose:  3 ml


Apixaban (Eliquis)  2.5 mg PO BID [facility]; Protocol


   Last Admin: 03/28/19 09:33 Dose:  2.5 mg


Atorvastatin Calcium (Lipitor)  40 mg PO HS [facility]


   Last Admin: 03/27/19 23:14 Dose:  40 mg


Bisoprolol Fumarate (Zebeta)  2.5 mg PO DAILY [facility]


   Last Admin: 03/28/19 09:35 Dose:  2.5 mg


Isosorbide Mononitrate (Imdur Er)  30 mg PO DAILY [facility]


   Last Admin: 03/28/19 09:36 Dose:  30 mg


Lisinopril (Zestril)  10 mg PO DAILY [facility]


   Last Admin: 03/28/19 09:36 Dose:  10 mg


Nystatin (Mycostatin Cream)  1 applic TOP TID [facility]


   Last Admin: 03/28/19 09:35 Dose:  1 applic











- Labs


Labs: 


                                        





                                 03/28/19 05:25 





                                 03/28/19 05:25 





                                        











PT  15.0 Seconds (9.8-13.1)  H  03/26/19  18:40    


 


INR  1.3   03/26/19  18:40    


 


APTT  36.3 Seconds (25.6-37.1)   03/26/19  18:40    














Assessment and Plan


(1) Acute hypercapnic respiratory failure


Status: Acute   





(2) Altered mental state


Status: Acute   





(3) COPD (chronic obstructive pulmonary disease)


Status: Chronic

## 2019-03-28 NOTE — CP.PCM.PN
Subjective





- Date & Time of Evaluation


Date of Evaluation: 03/28/19


Time of Evaluation: 08:00





- Subjective


Subjective: 





Patient is seen and examined at bedside. No acute event overnight. Patient 

Mental status improved, patient is oriented to self and place. daughter on 

bedside and she agree on patient seem to be improved. Otherwise patient have 

good appetite, eating well, denies SOB, chest pain, abd pain, diarrhea, 

constipation dysuria or polyuria. 





Objective





- Vital Signs/Intake and Output


Vital Signs (last 24 hours): 


                                        











Temp Pulse Resp BP Pulse Ox


 


 98.1 F   70   18   93/50 L  97 


 


 03/27/19 23:34  03/28/19 03:56  03/27/19 23:34  03/27/19 23:34  03/27/19 23:34











- Medications


Medications: 


                               Current Medications





Acetazolamide (Diamox 250 Mg Tab)  250 mg PO DAILY [facility]


   Last Admin: 03/27/19 10:50 Dose:  250 mg


Albuterol/Ipratropium (Duoneb 3 Mg/0.5 Mg (3 Ml) Ud)  3 ml INH RQID [facility]


   Last Admin: 03/27/19 19:32 Dose:  3 ml


Apixaban (Eliquis)  5 mg PO BID [facility]; Protocol


   Last Admin: 03/27/19 18:25 Dose:  5 mg


Atorvastatin Calcium (Lipitor)  40 mg PO HS [facility]


   Last Admin: 03/27/19 23:14 Dose:  40 mg


Bisoprolol Fumarate (Zebeta)  2.5 mg PO DAILY [facility]


   Last Admin: 03/27/19 10:51 Dose:  2.5 mg


Isosorbide Mononitrate (Imdur Er)  30 mg PO DAILY [facility]


   Last Admin: 03/27/19 10:51 Dose:  30 mg


Lisinopril (Zestril)  10 mg PO DAILY [facility]


   Last Admin: 03/27/19 10:51 Dose:  10 mg


Nystatin (Mycostatin Cream)  1 applic TOP TID [facility]


   Last Admin: 03/27/19 18:25 Dose:  1 applic











- Labs


Labs: 


                                        





                                 03/28/19 05:25 





                                 03/26/19 18:40 





                                        











PT  15.0 Seconds (9.8-13.1)  H  03/26/19  18:40    


 


INR  1.3   03/26/19  18:40    


 


APTT  36.3 Seconds (25.6-37.1)   03/26/19  18:40    














- Constitutional


Appears: Well, Non-toxic, No Acute Distress





- Head Exam


Head Exam: ATRAUMATIC, NORMAL INSPECTION, NORMOCEPHALIC





- Eye Exam


Eye Exam: EOMI, Normal appearance, PERRL


Pupil Exam: NORMAL ACCOMODATION, PERRL





- ENT Exam


ENT Exam: Mucous Membranes Moist, Normal Exam





- Neck Exam


Neck Exam: Full ROM, Normal Inspection





- Respiratory Exam


Respiratory Exam: Clear to Ausculation Bilateral, NORMAL BREATHING PATTERN





- Cardiovascular Exam


Cardiovascular Exam: REGULAR RHYTHM, +S1, +S2





- GI/Abdominal Exam


GI & Abdominal Exam: Soft, Normal Bowel Sounds





- Extremities Exam


Extremities Exam: Full ROM, Normal Capillary Refill, Normal Inspection





- Back Exam


Back Exam: NORMAL INSPECTION





- Neurological Exam


Neurological Exam: Alert, Awake, CN II-XII Intact, Normal Gait





- Psychiatric Exam


Psychiatric exam: Normal Affect, Normal Mood





- Skin


Skin Exam: Dry, Intact, Normal Color, Warm





Assessment and Plan





- Assessment and Plan (Free Text)


Assessment: 





Assessment: 





86 yo female with PMH of COPD, CAD, CHF, AFIB and chronic B/L chronic baseline 

B/L leg dermatitis and swelling, admitted for evaluation and management of 

altered mental status and generalized weakness.








PLAN: 





Altered Mental Status


Afebrile and stable, Improved, patient is oriented to self and place


CO2 improved to 55


Head CT with unremarkable results. 


F/U Pulm recommendation


Continue Bipap at night 


F/U BMP 





Hypercapnia/CO2 narcosis


Improved  CO2


Continue with High Flow O2. 





Anemia


H/H 9.1/38.8


F/U occult blood


eliquis from 5 to 2.5


F/U Cbc





Wekaness/Deconditioning-


PT consult, eval and treat


Pt needs constant care at all time. 


Physical therapy on case 





Bilateral  Lower extremity Stasis dermatitis 


Improving, S/P recent cellulitis treatment


Chronic stasis dermatitis , aggravated by edema.





COPD


On Oxygen by NC at home.


continue High Flow O2. 


Pulmonology consult, Dr Pyle, recomms appreciated 


Home meds resumed





Chronic CHF exacerbation diastolic dysfunction- preserved EF


Last echocardiogram on 02/04/19: LVEF 50-55%.


Home meds resumed: PO Lasix, Lisinopril, Imdur and Bisoprolol





Paroxysmal Afib 


Rate controlled


Home meds resumed: Cardizem, Bisoprolol and Eliquis





DVT prophylaxis


Eliquis 2.5 BID 





Diet


Heart healthy diet

## 2019-03-29 VITALS
SYSTOLIC BLOOD PRESSURE: 133 MMHG | DIASTOLIC BLOOD PRESSURE: 66 MMHG | HEART RATE: 84 BPM | TEMPERATURE: 97.1 F | RESPIRATION RATE: 20 BRPM

## 2019-03-29 LAB
% IRON SATURATION: 8 % (ref 20–55)
ARTERIAL BLOOD GAS HEMOGLOBIN: 8.8 G/DL (ref 11.7–17.4)
ARTERIAL BLOOD GAS O2 SAT: 98.1 % (ref 95–98)
ARTERIAL BLOOD GAS PCO2: 45 MM/HG (ref 35–45)
ARTERIAL BLOOD GAS TCO2: 28.6 MMOL/L (ref 22–28)
ARTERIAL PATENCY WRIST A: YES
BILIRUB UR-MCNC: NEGATIVE MG/DL
BUN SERPL-MCNC: 20 MG/DL (ref 7–17)
CALCIUM SERPL-MCNC: 8.6 MG/DL (ref 8.4–10.2)
COLOR UR: (no result)
ERYTHROCYTE [DISTWIDTH] IN BLOOD BY AUTOMATED COUNT: 17.1 % (ref 11.5–14.5)
FERRITIN SERPL-MCNC: 171 NG/ML (ref 11.1–264)
FOLATE SERPL-MCNC: 7.2 NG/ML
GFR NON-AFRICAN AMERICAN: 59
GLUCOSE UR STRIP-MCNC: (no result) MG/DL
HCO3 BLDA-SCNC: 26.4 MMOL/L (ref 21–28)
HGB BLD-MCNC: 8.3 G/DL (ref 12–16)
INHALED O2 CONCENTRATION: 32 %
IRON SERPL-MCNC: 18 UG/DL (ref 37–170)
LEUKOCYTE ESTERASE UR-ACNC: (no result) LEU/UL
MCH RBC QN AUTO: 28.8 PG (ref 27–31)
MCHC RBC AUTO-ENTMCNC: 33.3 G/DL (ref 33–37)
MCV RBC AUTO: 86.5 FL (ref 81–99)
O2 CAP BLDA-SCNC: 12 ML/DL (ref 16–24)
O2 CT BLDA-SCNC: 11.8 ML/DL (ref 15–23)
PH BLDA: 7.39 [PH] (ref 7.35–7.45)
PH UR STRIP: 6 [PH] (ref 5–8)
PLATELET # BLD: 201 K/UL (ref 130–400)
PO2 BLDA: 75 MM/HG (ref 80–100)
PROT UR STRIP-MCNC: 100 MG/DL
RBC # BLD AUTO: 2.86 MIL/UL (ref 3.8–5.2)
RBC # UR STRIP: (no result) /UL
SP GR UR STRIP: 1.01 (ref 1–1.03)
SQUAMOUS EPITHIAL: 3 /HPF (ref 0–5)
TIBC SERPL-MCNC: 216 UG/DL (ref 250–450)
URINE CLARITY: (no result)
UROBILINOGEN UR-MCNC: 4 MG/DL (ref 0.2–1)
VIT B12 SERPL-MCNC: 205 PG/ML (ref 239–931)
WBC # BLD AUTO: 5.6 K/UL (ref 4.8–10.8)
WBC CLUMPS # UR AUTO: (no result) /HPF

## 2019-03-29 RX ADMIN — IPRATROPIUM BROMIDE AND ALBUTEROL SULFATE SCH ML: .5; 3 SOLUTION RESPIRATORY (INHALATION) at 07:27

## 2019-03-29 RX ADMIN — IPRATROPIUM BROMIDE AND ALBUTEROL SULFATE SCH ML: .5; 3 SOLUTION RESPIRATORY (INHALATION) at 12:48

## 2019-03-29 NOTE — CP.PCM.DIS
Provider





- Provider


Date of Admission: 


03/26/19 21:27





Attending physician: 


Melony Rangel MD





Consults: 








03/27/19 07:00


Pulmonology Consult Routine 


   Comment: 


   Consulting Provider: Molina Pyle


   Consulting Physician: Molina Pyle


   Reason for Consult: Hypercapnia





03/27/19 09:00


Nursing Referral for Wound Care Routine 


   Comment: Chronic bilateral lower extremity dermatitis


   Physician Instructions: 


   Reason For Exam: Chronic bilateral lower extremity dermatitis











Time Spent in preparation of Discharge (in minutes): 20





Diagnosis





- Discharge Diagnosis


(1) Acute hypercapnic respiratory failure


Status: Acute   Priority: High   





(2) Altered mental state


Status: Resolved   Priority: High   





(3) DVT prophylaxis


Status: Acute   





(4) Physical deconditioning


Status: Acute   Priority: High   





(5) Rash and nonspecific skin eruption


Status: Chronic   Priority: High   





(6) UTI (urinary tract infection)


Status: Acute   





(7) CHF (congestive heart failure)


Status: Chronic   Priority: High   





(8) COPD (chronic obstructive pulmonary disease)


Status: Chronic   Priority: High   





(9) Anemia


Status: Acute   





Hospital Course





- Lab Results


Lab Results: 


                             Most Recent Lab Values











WBC  5.6 K/uL (4.8-10.8)   03/29/19  06:09    


 


RBC  2.86 Mil/uL (3.80-5.20)  L  03/29/19  06:09    


 


Hgb  8.3 g/dL (12.0-16.0)  L  03/29/19  06:09    


 


Hct  24.8 % (34.0-47.0)  L  03/29/19  06:09    


 


MCV  86.5 fl (81.0-99.0)  D 03/29/19  06:09    


 


MCH  28.8 pg (27.0-31.0)   03/29/19  06:09    


 


MCHC  33.3 g/dL (33.0-37.0)   03/29/19  06:09    


 


RDW  17.1 % (11.5-14.5)  H  03/29/19  06:09    


 


Plt Count  201 K/uL (130-400)   03/29/19  06:09    


 


MPV  8.4 fl (7.2-11.7)   03/26/19  18:40    


 


Neut % (Auto)  75.9 % (50.0-75.0)  H  03/26/19  18:40    


 


Lymph % (Auto)  10.1 % (20.0-40.0)  L  03/26/19  18:40    


 


Mono % (Auto)  9.5 % (0.0-10.0)   03/26/19  18:40    


 


Eos % (Auto)  4.2 % (0.0-4.0)  H  03/26/19  18:40    


 


Baso % (Auto)  0.3 % (0.0-2.0)   03/26/19  18:40    


 


Neut # (Auto)  4.7 K/uL (1.8-7.0)   03/26/19  18:40    


 


Lymph # (Auto)  0.6 K/uL (1.0-4.3)  L  03/26/19  18:40    


 


Mono # (Auto)  0.6 K/uL (0.0-0.8)   03/26/19  18:40    


 


Eos # (Auto)  0.3 K/uL (0.0-0.7)   03/26/19  18:40    


 


Baso # (Auto)  0.0 K/uL (0.0-0.2)   03/26/19  18:40    


 


PT  15.0 Seconds (9.8-13.1)  H  03/26/19  18:40    


 


INR  1.3   03/26/19  18:40    


 


APTT  36.3 Seconds (25.6-37.1)   03/26/19  18:40    


 


pCO2  55 mm/Hg (35-45)  H  03/27/19  22:34    


 


pO2  109 mm/Hg ()  H  03/27/19  22:34    


 


HCO3  26.3 mmol/L (21-28)   03/27/19  22:34    


 


ABG pH  7.32  (7.35-7.45)  L  03/27/19  22:34    


 


ABG Total CO2  30.0 mmol/L (22-28)  H  03/27/19  22:34    


 


ABG O2 Saturation  100.9 % (95-98)  H  03/27/19  22:34    


 


ABG O2 Content  12.1 ML/dL (15-23)  L  03/27/19  22:34    


 


ABG Base Excess  1.7 mmol/L (-2.0-3.0)   03/27/19  22:34    


 


ABG Hemoglobin  8.6 g/dL (11.7-17.4)  L  03/27/19  22:34    


 


ABG Carboxyhemoglobin  2.0 % (0.5-1.5)  H  03/27/19  22:34    


 


POC ABG HHb (Measured)  -0.9 % (0.0-5.0)  L  03/27/19  22:34    


 


ABG Methemoglobin  0.5 % (0.0-3.0)   03/27/19  22:34    


 


ABG O2 Capacity  12.0 mL/dL (16-24)  L  03/27/19  22:34    


 


Chito Test  Yes   03/27/19  22:34    


 


ABG Potassium  4.3 mmol/L (3.6-5.2)   03/26/19  18:37    


 


A-a O2 Difference  36.0 mm/Hg  03/27/19  22:34    


 


Hgb O2 Saturation  98.5 % (95.0-98.0)  H  03/27/19  22:34    


 


Sodium  140.0 mmol/L (132-148)   03/26/19  18:37    


 


Chloride  112.0 mmol/L ()  H  03/26/19  18:37    


 


Glucose  173 mg/dL ()  H  03/26/19  18:37    


 


Lactate  1.0 mmol/L (0.7-2.1)   03/26/19  18:37    


 


Liter Flow  25   03/27/19  00:39    


 


Vent Mode  Bipap   03/27/19  22:34    


 


Mechanical Rate  16   03/27/19  22:34    


 


FiO2  30.0 %  03/27/19  22:34    


 


Inspiratory BiPAP  12   03/27/19  22:34    


 


Expiratory BiPAP  6   03/27/19  22:34    


 


Sodium  136 mmol/l (132-148)   03/29/19  06:09    


 


Potassium  3.7 MMOL/L (3.6-5.0)   03/29/19  06:09    


 


Chloride  103 mmol/L ()   03/29/19  06:09    


 


Carbon Dioxide  26 mmol/L (22-30)   03/29/19  06:09    


 


Anion Gap  11  (10-20)   03/29/19  06:09    


 


BUN  20 mg/dl (7-17)  H  03/29/19  06:09    


 


Creatinine  0.9 mg/dl (0.7-1.2)   03/29/19  06:09    


 


Est GFR ( Amer)  > 60   03/29/19  06:09    


 


Est GFR (Non-Af Amer)  59   03/29/19  06:09    


 


Random Glucose  117 mg/dL ()  H  03/29/19  06:09    


 


Calcium  8.6 mg/dL (8.4-10.2)   03/29/19  06:09    


 


Phosphorus  3.9 mg/dl (2.5-4.5)   03/26/19  18:40    


 


Magnesium  1.9 MG/DL (1.6-2.3)   03/26/19  18:40    


 


Total Bilirubin  0.3 mg/dl (0.2-1.3)   03/28/19  05:25    


 


AST  12 U/L (14-36)  L D 03/28/19  05:25    


 


ALT  30 U/L (9-52)   03/28/19  05:25    


 


Alkaline Phosphatase  96 U/L ()   03/28/19  05:25    


 


Troponin I  < 0.0120 ng/mL (0.00-0.120)   03/26/19  18:40    


 


Total Protein  6.2 G/DL (6.3-8.2)  L  03/28/19  05:25    


 


Albumin  3.1 g/dL (3.5-5.0)  L  03/28/19  05:25    


 


Globulin  3.1 gm/dL (2.2-3.9)   03/28/19  05:25    


 


Albumin/Globulin Ratio  1.0  (1.0-2.1)   03/28/19  05:25    


 


Arterial Blood Potassium  4.3 mmol/L (3.6-5.2)   03/26/19  18:37    


 


Urine Color  Dedra  (YELLOW)   03/29/19  05:00    


 


Urine Clarity  Turbid  (Clear)   03/29/19  05:00    


 


Urine pH  6.0  (5.0-8.0)   03/29/19  05:00    


 


Ur Specific Gravity  1.015  (1.003-1.030)   03/29/19  05:00    


 


Urine Protein  100 mg/dL (NEGATIVE)   03/29/19  05:00    


 


Urine Glucose (UA)  Neg mg/dL (NEGATIVE)   03/29/19  05:00    


 


Urine Ketones  Negative mg/dL (NEGATIVE)   03/29/19  05:00    


 


Urine Blood  Small  (NEGATIVE)   03/29/19  05:00    


 


Urine Nitrate  Positive  (NEGATIVE)  H  03/29/19  05:00    


 


Urine Bilirubin  Negative  (NEGATIVE)   03/29/19  05:00    


 


Urine Urobilinogen  4.0 mg/dL (0.2-1.0)  H  03/29/19  05:00    


 


Ur Leukocyte Esterase  Mod Bradford/uL (Negative)   03/29/19  05:00    


 


Urine RBC (Auto)  98 /hpf (0-3)  H  03/29/19  05:00    


 


Urine WBC Clumps (Auto)  Many /hpf (NONE)  H  03/29/19  05:00    


 


Urine Microscopic WBC  4757 /hpf (0-5)  H  03/29/19  05:00    


 


Ur Squamous Epith Cells  3 /hpf (0-5)   03/29/19  05:00    


 


Urine Yeast (Budding)  Few /hpf (NEGATIVE)  H  03/29/19  05:00    


 


Alcohol, Quantitative  < 10 mg/dl (0-10)   03/26/19  18:40    














- Hospital Course


Hospital Course: 





86 y/o F with a PMH of COPD, CAD, CHF, AFib and chronic b/l chronic baseline b/l

leg dermatitis and swelling, was brought by her daughter due to confusion and 

generalized weakness. Admitted for evaluation and management of altered mental 

status and generalized weakness








ED Course: 


ABG showed a pCO2 of 62-high


ER  ph 7.2 PCO2 62 PO2 79


High flow O2 was initiated. 





Head CT Cerebral Atrophy [id number]. Chronic appearing 

periventricular and subcortical deep white matter microvascular disease. No 

acute intracranial pathology appreciated. 


CXR  no active disease





During her stay patient was AMS, she was wheezing and diminished b/l lung sound,

her CO2 of 67H. Dr Pyle consulted. Patient was placed on Bipap Rate 21, ,

Ve11.7 PIP 13 and Lexapro was hold. On second day patient AMS improved, she was 

oriented to self and place, her CO2 decreased to 55, and she was more alert. 





Patient blood pressure was in the 90s systolic, Lisinopril was stopped


patient also H/H 9.8/31.2, Anemia work up was made and Eliquis decreased to 2.5 


patient also was treat for her CHF, Paroxysmal Afib, obesity, LE dermatitis. 








Patient was seen and examined at bedside today, no acute event overnight. 

Patient daughter at bedside. Patient is alert and oriented to self, and place, 

daughter report mom have improved. She is feeding well, with no difficulty. She 

denies any dizziness, N/V, headache, Chest pain, sob, abd pain, diarrhea, or 

constipation. 





Patient will be discharged to [facility] for subacute rehab. 


Patient need to follow up with PCP for Anemia work up, and better control of 

blood pressure. 





Patient medication change 


Discontinue Lisinopril


Decrease Eliquis to 2.5 


Add Ferrous sulfate 


Macrobid Bid 100mg 





Discharge Exam





- Head Exam


Head Exam: ATRAUMATIC, NORMAL INSPECTION, NORMOCEPHALIC





Discharge Plan





- Discharge Medications


Prescriptions: 


Ferrous Sulfate 325 mg PO DAILY #30 tablet


Nitrofurantoin Monohyd/M-Cryst [Macrobid 100 mg Capsule] 100 mg PO BID #7 

capsule





- Follow Up Plan


Condition: FAIR


Disposition: REHAB FACILITY/REHAB UNIT


Instructions:  Altered Mental Status (DC)


Additional Instructions: 


[facility]


Lisinopril is Discontinued


Eliquis is decreased to 2.5 


Patient need to follow up for Anemia work out as outpatient


Referrals: 


Molina Pyle MD [Family Provider] -

## 2019-08-12 NOTE — CP.PCM.PN
Subjective





- Date & Time of Evaluation


Date of Evaluation: 09/19/17


Time of Evaluation: 10:45





- Subjective


Subjective: 





Pt seen and examined. Admitted relief from abdominal pain however has big 

concern that it might come again once she went home without getting 

cholecystectomy





Objective





- Vital Signs/Intake and Output


Vital Signs (last 24 hours): 


 











Temp Pulse Resp BP Pulse Ox


 


 97.5 F L  88   22   160/74 H  88 L


 


 09/19/17 09:18  09/19/17 09:18  09/19/17 09:18  09/19/17 09:18  09/19/17 09:18











- Medications


Medications: 


 Current Medications





Albuterol (Ventolin Hfa 90 Mcg/Actuation (8 G))  2 puff IH Q4H PRN


   PRN Reason: Shortness of Breath


Albuterol/Ipratropium (Duoneb 3 Mg/0.5 Mg (3 Ml) Ud)  3 ml INH RQID [facility]


   Last Admin: 09/19/17 11:14 Dose:  3 ml


Atorvastatin Calcium (Lipitor)  80 mg PO HS [facility]


   Last Admin: 09/18/17 21:28 Dose:  80 mg


Bisoprolol Fumarate (Zebeta)  2.5 mg PO DAILY [facility]


   Last Admin: 09/19/17 08:57 Dose:  2.5 mg


Enoxaparin Sodium (Lovenox)  40 mg SC DAILY [facility]


   PRN Reason: Protocol


   Last Admin: 09/19/17 08:58 Dose:  40 mg


Famotidine (Pepcid)  20 mg PO BID [facility]


   Last Admin: 09/19/17 08:56 Dose:  20 mg


Ciprofloxacin (Cipro 400mg/200ml Dsw)  400 mg in 200 mls @ 200 mls/hr IVPB Q12 

[facility]


   Last Admin: 09/19/17 09:01 Dose:  200 mls/hr


Isosorbide Mononitrate (Imdur Er)  30 mg PO DAILY [facility]


   Last Admin: 09/19/17 08:57 Dose:  30 mg


Ketorolac Tromethamine (Toradol)  15 mg IVP Q6 PRN


   PRN Reason: pain 5-10


Lactic Acid (Lac-Hydrin 12% Cream (140 G))  1 ea TOP BID [facility]


   Last Admin: 09/19/17 08:59 Dose:  1 applic


Lisinopril (Zestril)  2.5 mg PO DAILY [facility]


   Last Admin: 09/19/17 08:57 Dose:  2.5 mg


Ondansetron HCl (Zofran Inj)  4 mg IVP Q4 PRN


   PRN Reason: Nausea/Vomiting


   Last Admin: 09/15/17 20:42 Dose:  4 mg











- Labs


Labs: 


 





 09/19/17 05:15 





 09/19/17 05:15 





 











PT  11.9 Seconds (9.8-13.1)   09/15/17  12:30    


 


INR  1.2  (0.9-1.2)   09/15/17  12:30    


 


APTT  33.8 Seconds (25.6-37.1)   09/15/17  12:30    














- Constitutional


Appears: No Acute Distress





- Head Exam


Head Exam: ATRAUMATIC





- Eye Exam


Eye Exam: absent: Scleral icterus





- ENT Exam


ENT Exam: Mucous Membranes Moist





- Neck Exam


Neck Exam: absent: Meningismus





- Respiratory Exam


Respiratory Exam: absent: Rhonchi, Wheezes, Respiratory Distress





- Cardiovascular Exam


Cardiovascular Exam: REGULAR RHYTHM, +S1, +S2





- GI/Abdominal Exam


GI & Abdominal Exam: Soft.  absent: Tenderness





- Rectal Exam


Rectal Exam: Deferred





- Neurological Exam


Neurological Exam: Alert, Oriented x3





- Psychiatric Exam


Psychiatric exam: Normal Affect





- Skin


Skin Exam: Dry, Intact





Assessment and Plan


(1) Abdominal pain


Status: Resolved   





(2) Cholelithiases


Status: Chronic   





(3) COPD exacerbation


Status: Chronic   





(4) CAD (coronary artery disease)


Status: Chronic   





(5) Hypertension


Status: Chronic   





(6) Afib


Status: Acute   





(7) DVT prophylaxis


Status: Acute   





- Assessment and Plan (Free Text)


Assessment: 





87 yo female with history of CAD (2 stents 4 yrs ago), AFib (paroxysmal), COPD (

oxygen dependent) and HTN was seen in the ER because of on and off RUQ pain 

associated with nausea and vomiting secondary to cholelithiasis.








  


1. Abdominal pain 2/2 Cholelithiases


pain was resolved


tolerating solid but non-fatty food


continue Cipro 400 mg IVPB q12


HIDA able to visualize gallbladder ruling out cholecystitis


cardiology and pulmonology cleared patient for surgery





2. COPD exacerbation with hypoxia on Home O2


continue O2 inhalation at bedtime


Duoneb via nebulizer q 4hrs prn for SOB/wheezing   





3. Pulmonary Hypertension


ECHO: severe pulmonary HTN,  EF 50-55%


VQ scan





4. CAD (coronary artery disease)


ASA and Plavix on hold 


Lipitor 80mg PO HS.  


Zebeta 2.5mg PO daily.  


Isosorbide Mononitrate 30mg PO daily   





5. Hypertension


BP controlled. 


continue Lisinopril 2.5mg PO daily.  


Zebeta 2.5mg PO daily.  


Isosorbide Mononitrate 30mg PO daily   





6. Afib


in sinus and well controlled rate.


continue Zebeta





7. DVT prophylaxis


venodyne boots while on bed  


Lovenox to be held before surgery when scheduled yes
